# Patient Record
Sex: MALE | Race: WHITE | Employment: UNEMPLOYED | URBAN - METROPOLITAN AREA
[De-identification: names, ages, dates, MRNs, and addresses within clinical notes are randomized per-mention and may not be internally consistent; named-entity substitution may affect disease eponyms.]

---

## 2017-01-16 ENCOUNTER — TELEPHONE (OUTPATIENT)
Dept: CARDIOLOGY CLINIC | Age: 47
End: 2017-01-16

## 2017-01-16 NOTE — TELEPHONE ENCOUNTER
Benton Layton, verified on hippa form, said pt's labs have been completed for [address] [place] [address] program. She said [address] [place] [address] will be faxing something to us to to get the prior auth going. She also gave me the number [phone] to call. I told her we would work on this.

## 2017-01-17 NOTE — TELEPHONE ENCOUNTER
Mateo Cody on hippa form, left voice message on phone saying she is going to fax a Repatha paper to us to fill out for the [employer] Program. She said we need to fill out our portion and fax to [address] [place] [address] Avenue. I printed out pt's labs and also sent in original paper to [address] [place] [address] Avenue.

## 2017-02-03 NOTE — TELEPHONE ENCOUNTER
Called pt and left message stating that all his Repatha papers and assistance paperwork has been faxed off. Advised if he has any questions he could call me back or wait until his upcoming appt next week with  to discuss.

## 2017-02-07 ENCOUNTER — OFFICE VISIT (OUTPATIENT)
Dept: CARDIOLOGY CLINIC | Age: 47
End: 2017-02-07

## 2017-02-07 VITALS
SYSTOLIC BLOOD PRESSURE: 120 MMHG | WEIGHT: 266 LBS | OXYGEN SATURATION: 96 % | HEIGHT: 70 IN | BODY MASS INDEX: 38.08 KG/M2 | RESPIRATION RATE: 18 BRPM | DIASTOLIC BLOOD PRESSURE: 82 MMHG | HEART RATE: 64 BPM

## 2017-02-07 DIAGNOSIS — I10 ESSENTIAL HYPERTENSION, BENIGN: Primary | ICD-10-CM

## 2017-02-07 DIAGNOSIS — Z98.61 S/P PTCA (PERCUTANEOUS TRANSLUMINAL CORONARY ANGIOPLASTY): ICD-10-CM

## 2017-02-07 DIAGNOSIS — E78.2 MIXED HYPERLIPIDEMIA: ICD-10-CM

## 2017-02-07 DIAGNOSIS — I63.341 CEREBRAL INFARCTION DUE TO THROMBOSIS OF RIGHT CEREBELLAR ARTERY (HCC): ICD-10-CM

## 2017-02-07 DIAGNOSIS — I25.10 CORONARY ARTERY DISEASE INVOLVING NATIVE CORONARY ARTERY OF NATIVE HEART WITHOUT ANGINA PECTORIS: ICD-10-CM

## 2017-02-07 NOTE — MR AVS SNAPSHOT
Visit Information Date & Time Provider Department Dept. Phone Encounter #  
 2/7/2017  9:00 AM Rachael Craig, [address] [place] [address] [facility] [phone] [id number] Follow-up Instructions Return in about 6 months (around 8/7/2017). Your Appointments 3/7/2017  3:30 PM  
ROUTINE CARE with Ya Ashford MD  
[facility] ([facility]) Appt Note: f/u appt $0pb $0cp [facility] 12/20/16  
 [address] [place] [address]  
[phone]  
  
   
 [facility] [address] Upcoming Health Maintenance Date Due DTaP/Tdap/Td series (2 - Td) 11/30/2025 Allergies as of 2/7/2017  Review Complete On: 2/7/2017 By: Rachael Craig MD  
  
 Severity Noted Reaction Type Reactions Crestor [Rosuvastatin]  12/11/2014    Rash Iodinated Contrast Media - Oral And Iv Dye  02/26/2013    Hives Statins-hmg-coa Reductase Inhibitors  07/26/2016    Myalgia Current Immunizations  Reviewed on 9/12/2016 Name Date Influenza Vaccine (Quad) PF 9/12/2016, 11/30/2015 Tdap 11/30/2015 Not reviewed this visit You Were Diagnosed With   
  
 Codes Comments Essential hypertension, benign    -  Primary ICD-10-CM: I10 
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  
 120/82 (BP 1 Location: Right arm, BP Patient Position: Sitting) 64 18 5' 10\" (1.778 m) 266 lb (120.7 kg) 96% BMI Smoking Status 38.17 kg/m2 Former Smoker Vitals History BMI and BSA Data Body Mass Index Body Surface Area  
 38.17 kg/m 2 2.44 m 2 Preferred Pharmacy Pharmacy Name Phone [facility] Adriane Bravo [phone] Your Updated Medication List  
  
   
This list is accurate as of: 2/7/17  9:31 AM.  Always use your most recent med list. ALLEGRA 180 mg tablet Generic drug:  fexofenadine Take  by mouth daily. citalopram 20 mg tablet Commonly known as:  CELEXA  
TAKE ONE & ONE-HALF TABLETS BY MOUTH ONCE DAILY  
  
 clopidogrel 75 mg Tab Commonly known as:  PLAVIX Take 1 Tab by mouth daily. evolocumab pen injection Commonly known as:  Reese Diaz  
by SubCUTAneous route. FISH OIL 1,000 mg Cap Generic drug:  omega-3 fatty acids-vitamin e Take 1 Cap by mouth.  
  
 isosorbide mononitrate ER 60 mg CR tablet Commonly known as:  IMDUR  
TAKE ONE TABLET BY MOUTH IN THE MORNING  
  
 metoprolol tartrate 25 mg tablet Commonly known as:  LOPRESSOR Take 0.5 Tabs by mouth nightly. nortriptyline 25 mg capsule Commonly known as:  PAMELOR Take 2 Caps by mouth nightly. pantoprazole 20 mg tablet Commonly known as:  PROTONIX  
TAKE ONE TABLET BY MOUTH TWICE DAILY Ranolazine 1,000 mg Tb12 Commonly known as:  RANEXA Take 1 Tab by mouth two (2) times a day. SUMAtriptan 100 mg tablet Commonly known as:  IMITREX Take 1 Tab by mouth once as needed for Migraine. topiramate 50 mg tablet Commonly known as:  TOPAMAX Take 1 Tab by mouth two (2) times a day. We Performed the Following AMB POC EKG ROUTINE W/ 12 LEADS, INTER & REP [31734 CPT(R)] Follow-up Instructions Return in about 6 months (around 8/7/2017). Introducing [facility]! Dear Srinivas Gonzalez: Thank you for requesting a Fashiontrothart account.   Our records indicate that you already have an active MenuSpring account. You can access your account anytime at [email or web address] Did you know that you can access your hospital and ER discharge instructions at any time in MenuSpring? You can also review all of your test results from your hospital stay or ER visit. Additional Information If you have questions, please visit the Frequently Asked Questions section of the MenuSpring website at [email or web address] Remember, MenuSpring is NOT to be used for urgent needs. For medical emergencies, dial 911. Now available from your iPhone and Android! Please provide this summary of care documentation to your next provider. Your primary care clinician is listed as Jaz Wayne. If you have any questions after today's visit, please call [phone].

## 2017-02-07 NOTE — PROGRESS NOTES
Chief Complaint   Patient presents with    Hypertension     6 month follow up     C/O SOB on exertion

## 2017-02-07 NOTE — PROGRESS NOTES
2/7/2017 9:29 AM      Subjective:     Aubree Ying is here for f/u visit. He denies chest pain, chest pressure/discomfort, palpitations, irregular heart beats, near-syncope, syncope, orthopnea, paroxysmal nocturnal dyspnea, claudication, lower extremity edema. Visit Vitals    /82 (BP 1 Location: Right arm, BP Patient Position: Sitting)    Pulse 64    Resp 18    Ht 5' 10\" (1.778 m)    Wt 266 lb (120.7 kg)    SpO2 96%    BMI 38.17 kg/m2     Current Outpatient Prescriptions   Medication Sig    metoprolol tartrate (LOPRESSOR) 25 mg tablet Take 0.5 Tabs by mouth nightly.  topiramate (TOPAMAX) 50 mg tablet Take 1 Tab by mouth two (2) times a day.  SUMAtriptan (IMITREX) 100 mg tablet Take 1 Tab by mouth once as needed for Migraine.  nortriptyline (PAMELOR) 25 mg capsule Take 2 Caps by mouth nightly.  citalopram (CELEXA) 20 mg tablet TAKE ONE & ONE-HALF TABLETS BY MOUTH ONCE DAILY    clopidogrel (PLAVIX) 75 mg tablet Take 1 Tab by mouth daily.  pantoprazole (PROTONIX) 20 mg tablet TAKE ONE TABLET BY MOUTH TWICE DAILY    isosorbide mononitrate ER (IMDUR) 60 mg CR tablet TAKE ONE TABLET BY MOUTH IN THE MORNING    Ranolazine (RANEXA) 1,000 mg Tb12 Take 1 Tab by mouth two (2) times a day.  omega-3 fatty acids-vitamin e (FISH OIL) 1,000 mg cap Take 1 Cap by mouth.  fexofenadine (ALLEGRA) 180 mg tablet Take  by mouth daily.  evolocumab (REPATHA SURECLICK) pen injection by SubCUTAneous route. No current facility-administered medications for this visit.           Objective:      Visit Vitals    /82 (BP 1 Location: Right arm, BP Patient Position: Sitting)    Pulse 64    Resp 18    Ht 5' 10\" (1.778 m)    Wt 266 lb (120.7 kg)    SpO2 96%    BMI 38.17 kg/m2       Data Review:     EKG: Normal sinus rhythm, no acute st/t changes    Reviewed and/or ordered active problem list, medication list tests    Past Medical History   Diagnosis Date    CAD (coronary artery disease)     GERD (gastroesophageal reflux disease)     Headache(784.0)     Hypertension     Stroke ([facility] [address] 2011      Past Surgical History   Procedure Laterality Date    Hx coronary stent placement      Pr cardiac surg procedure unlist       Allergies   Allergen Reactions    Crestor [Rosuvastatin] Rash    Iodinated Contrast Media - Oral And Iv Dye Hives    Statins-Hmg-Coa Reductase Inhibitors Myalgia      Family History   Problem Relation Age of Onset    Heart Disease Mother     Hypertension Mother     Diabetes Father     Heart Disease Father       Social History     Social History    Marital status: SINGLE     Spouse name: N/A    Number of children: 0    Years of education: N/A     Occupational History    disabled Not Employed     Social History Main Topics    Smoking status: Former Smoker     Packs/day: 0.30     Years: 30.00     Types: Cigarettes     Quit date: 9/19/2011    Smokeless tobacco: Never Used    Alcohol use No    Drug use: No    Sexual activity: Not Currently     Other Topics Concern    Not on file     Social History Narrative    Lives on his own friends and relatives look in on him. Review of Systems     General: Not Present- Anorexia, Chills, Dietary Changes, Fatigue, Fever, Medication Changes, Night Sweats, Weight Gain > 10lbs. and Weight Loss > 10lbs. .  Skin: Not Present- Bruising and Excessive Sweating. HEENT: Not Present- Headache, Visual Loss and Vertigo. Respiratory: Not Present- Cough, Decreased Exercise Tolerance, Difficulty Breathing, Snoring and Wheezing. Cardiovascular: Not Present- Abnormal Blood Pressure, Chest Pain, Claudications, Difficulty Breathing On Exertion, Edema, Fainting / Blacking Out, Irregular Heart Beat, Night Cramps, Orthopnea, Palpitations, Paroxysmal Nocturnal Dyspnea, Rapid Heart Rate, Shortness of Breath and Swelling of Extremities.   Gastrointestinal: Not Present- Black, Tarry Stool, Bloody Stool, Diarrhea, Hematemesis, Rectal Bleeding and Vomiting. Musculoskeletal: Not Present- Muscle Pain and Muscle Weakness. Neurological: Not Present- Dizziness. Psychiatric: Not Present- Depression. Endocrine: Not Present- Cold Intolerance, Heat Intolerance and Thyroid Problems. Hematology: Not Present- Abnormal Bleeding, Anemia, Blood Clots and Easy Bruising.       Physical Exam   The physical exam findings are as follows:       General   Mental Status - Alert. General Appearance - Not in acute distress. Chest and Lung Exam   Inspection: Accessory muscles - No use of accessory muscles in breathing. Auscultation:   Breath sounds: - Normal.      Cardiovascular   Inspection: Jugular vein - Bilateral - Inspection Normal.  Palpation/Percussion:   Apical Impulse: - Normal.  Auscultation: Rhythm - Regular. Heart Sounds - S1 WNL and S2 WNL. No S3 or S4. Murmurs & Other Heart Sounds: Auscultation of the heart reveals - No Murmurs. Carotid arteries - No Carotid bruit. Peripheral Vascular   Upper Extremity: Inspection - Bilateral - No Cyanotic nailbeds or Digital clubbing. Lower Extremity:   Palpation: Edema - Bilateral - No edema. Assessment:       ICD-10-CM ICD-9-CM    1. Essential hypertension, benign I10 401.1 AMB POC EKG ROUTINE W/ 12 LEADS, INTER & REP   2. Coronary artery disease involving native coronary artery of native heart without angina pectoris I25.10 414.01    3. S/P PTCA (percutaneous transluminal coronary angioplasty) Z98.61 V45.82    4. 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 Mixed hyperlipidemia E78.2 272.2        Plan:     1 . CAD: stable. Medical treatment. Normal stress test last Jan. On last cath 3/2013 :LM: mild disease.  LAD: proximal moderate calcified disease. Previously placed proximal and mid LAD stent patent. Distal LAD has mild disease with 30% focal stenosis. Large size D1 with mild disease, ostial 20-30% stenosis.  LCx: large and dominant.  Mild disease. OM1 mild disease. OM2 large size, mild disease. Distal LCx distal to OM2 extending into LPDA is 100% occluded at the ostium, distal vessel is visualized via collaterals.  RCA: small, non dominant, mild disease. 2. Hyperlipidemia: on repatha. Working on getting reauth for next year. Diet d/w pt. Does not drink. 3. HTN: stable.

## 2017-02-21 ENCOUNTER — TELEPHONE (OUTPATIENT)
Dept: CARDIOLOGY CLINIC | Age: 47
End: 2017-02-21

## 2017-02-21 NOTE — TELEPHONE ENCOUNTER
Called Fatimah on hippa form and left message to call me back regarding pt's [place]. Edson Lazar on hippa form, returned my call, I relayed message that Sofía Lindsay is not going to cover his [address] since he has coverage thru his insurance. She said she will try to call his insurance and get back to me next week and I told her I will reach out to [place] Koehler once I hear something from her. She verbalized that she understood everything. Called Fatimah on hippa form and left message to call me back.

## 2017-03-07 ENCOUNTER — OFFICE VISIT (OUTPATIENT)
Dept: INTERNAL MEDICINE CLINIC | Age: 47
End: 2017-03-07

## 2017-03-07 VITALS
HEIGHT: 70 IN | RESPIRATION RATE: 16 BRPM | SYSTOLIC BLOOD PRESSURE: 108 MMHG | TEMPERATURE: 97.7 F | BODY MASS INDEX: 37.94 KG/M2 | HEART RATE: 60 BPM | OXYGEN SATURATION: 97 % | WEIGHT: 265 LBS | DIASTOLIC BLOOD PRESSURE: 77 MMHG

## 2017-03-07 DIAGNOSIS — G44.019 EPISODIC CLUSTER HEADACHE, NOT INTRACTABLE: ICD-10-CM

## 2017-03-07 DIAGNOSIS — I95.9 HYPOTENSION, UNSPECIFIED HYPOTENSION TYPE: Primary | ICD-10-CM

## 2017-03-07 DIAGNOSIS — E78.2 MIXED HYPERLIPIDEMIA: ICD-10-CM

## 2017-03-07 DIAGNOSIS — I69.354 HEMIPARESIS AFFECTING LEFT SIDE AS LATE EFFECT OF CEREBROVASCULAR ACCIDENT (CVA) (HCC): ICD-10-CM

## 2017-03-07 DIAGNOSIS — I25.10 CORONARY ARTERY DISEASE INVOLVING NATIVE CORONARY ARTERY OF NATIVE HEART WITHOUT ANGINA PECTORIS: ICD-10-CM

## 2017-03-07 NOTE — MR AVS SNAPSHOT
Visit Information Date & Time Provider Department Dept. Phone Encounter #  
 3/7/2017  3:30 PM MD Francisco Garcia Dr [id number] Follow-up Instructions Return in about 4 months (around 7/7/2017) for routine follow up. Your Appointments 8/1/2017  9:00 AM  
6 MONTH with Regla Estrada MD  
[facility] Domenica Lee) Appt Note: Per  C $0CP REM  
 [address]  
[phone] [address] Upcoming Health Maintenance Date Due DTaP/Tdap/Td series (2 - Td) 11/30/2025 Allergies as of 3/7/2017  Review Complete On: 3/7/2017 By: Phillip Bettencourt LPN Severity Noted Reaction Type Reactions Crestor [Rosuvastatin]  12/11/2014    Rash Iodinated Contrast Media - Oral And Iv Dye  02/26/2013    Hives Statins-hmg-coa Reductase Inhibitors  07/26/2016    Myalgia Current Immunizations  Reviewed on 9/12/2016 Name Date Influenza Vaccine (Quad) PF 9/12/2016, 11/30/2015 Tdap 11/30/2015 Not reviewed this visit You Were Diagnosed With   
  
 Codes Comments Hypotension, unspecified hypotension type    -  Primary ICD-10-CM: I95.9 ICD-9-CM: 458.9 Mixed hyperlipidemia     ICD-10-CM: E78.2 ICD-9-CM: 272.2 Episodic cluster headache, not intractable     ICD-10-CM: Q21.820 
ICD-9-CM: 339.01 Coronary artery disease involving native coronary artery of native heart without angina pectoris     ICD-10-CM: I25.10 ICD-9-CM: 414.01 Vitals BP Pulse Temp Resp Height(growth percentile) Weight(growth percentile) 108/77 (BP 1 Location: Right arm, BP Patient Position: Sitting) 60 97.7 °F (36.5 °C) (Oral) 16 5' 10\" (1.778 m) 265 lb (120.2 kg) SpO2 BMI Smoking Status 97% 38.02 kg/m2 Former Smoker BMI and BSA Data Body Mass Index Body Surface Area  38.02 kg/m 2 2.44 m 2  
  
 Preferred Pharmacy Pharmacy Name Phone [facility] Adriane Bravo [phone] Your Updated Medication List  
  
   
This list is accurate as of: 3/7/17  4:46 PM.  Always use your most recent med list. ALLEGRA 180 mg tablet Generic drug:  fexofenadine Take  by mouth daily. citalopram 20 mg tablet Commonly known as:  CELEXA  
TAKE ONE & ONE-HALF TABLETS BY MOUTH ONCE DAILY  
  
 clopidogrel 75 mg Tab Commonly known as:  PLAVIX Take 1 Tab by mouth daily. evolocumab pen injection Commonly known as:  Mary Bodo  
by SubCUTAneous route. FISH OIL 1,000 mg Cap Generic drug:  omega-3 fatty acids-vitamin e Take 1 Cap by mouth.  
  
 isosorbide mononitrate ER 60 mg CR tablet Commonly known as:  IMDUR  
TAKE ONE TABLET BY MOUTH IN THE MORNING  
  
 metoprolol tartrate 25 mg tablet Commonly known as:  LOPRESSOR Take 0.5 Tabs by mouth nightly. nortriptyline 25 mg capsule Commonly known as:  PAMELOR Take 2 Caps by mouth nightly. pantoprazole 20 mg tablet Commonly known as:  PROTONIX  
TAKE ONE TABLET BY MOUTH TWICE DAILY Ranolazine 1,000 mg Tb12 Commonly known as:  RANEXA Take 1 Tab by mouth two (2) times a day. SUMAtriptan 100 mg tablet Commonly known as:  IMITREX Take 1 Tab by mouth once as needed for Migraine. topiramate 50 mg tablet Commonly known as:  TOPAMAX Take 1 Tab by mouth two (2) times a day. Follow-up Instructions Return in about 4 months (around 7/7/2017) for routine follow up. Introducing [facility]! Dear Francisac Sanon: Thank you for requesting a Cornice account. Our records indicate that you already have an active Cornice account. You can access your account anytime at [email or web address] Did you know that you can access your hospital and ER discharge instructions at any time in 1World Online? You can also review all of your test results from your hospital stay or ER visit. Additional Information If you have questions, please visit the Frequently Asked Questions section of the 1World Online website at [email or web address] Remember, 1World Online is NOT to be used for urgent needs. For medical emergencies, dial 911. Now available from your iPhone and Android! Please provide this summary of care documentation to your next provider. Your primary care clinician is listed as Noemi Parker. If you have any questions after today's visit, please call [phone].

## 2017-03-08 PROBLEM — I69.354 HEMIPARESIS AFFECTING LEFT SIDE AS LATE EFFECT OF CEREBROVASCULAR ACCIDENT (CVA) (HCC): Status: ACTIVE | Noted: 2017-03-08

## 2017-03-08 PROBLEM — I25.10 CORONARY ARTERY DISEASE INVOLVING NATIVE CORONARY ARTERY OF NATIVE HEART WITHOUT ANGINA PECTORIS: Status: ACTIVE | Noted: 2017-03-08

## 2017-03-08 NOTE — PROGRESS NOTES
HISTORY OF PRESENT ILLNESS  Rashmi Jones is a 48 y.o. male. Hypotension   The history is provided by the patient and caregiver. This is a recurrent problem. The problem has been gradually improving. Pertinent negatives include no chest pain, no headaches and no shortness of breath. Treatments tried: Reduced his blood pressure medicine. The treatment provided mild relief. previously was on beta-blocker, which I reduced the dosage. Not really symptomatic with his hypotension and had not noticed much changes with the change in medicines. Cannot reduce nitroglycerin otherwise he gets chest pain. Headaches are stable still gets some, seem to be not too severe and have some relief with the Imitrex. Cardiology is trying to get him Repatha. Has to be cleared through insurance, very expensive. Coronary artery disease is stable. Current Outpatient Prescriptions   Medication Sig Dispense Refill    metoprolol tartrate (LOPRESSOR) 25 mg tablet Take 0.5 Tabs by mouth nightly. 45 Tab 3    topiramate (TOPAMAX) 50 mg tablet Take 1 Tab by mouth two (2) times a day. 60 Tab 5    SUMAtriptan (IMITREX) 100 mg tablet Take 1 Tab by mouth once as needed for Migraine. 10 Tab 5    nortriptyline (PAMELOR) 25 mg capsule Take 2 Caps by mouth nightly. 60 Cap 11    citalopram (CELEXA) 20 mg tablet TAKE ONE & ONE-HALF TABLETS BY MOUTH ONCE DAILY 50 Tab 11    clopidogrel (PLAVIX) 75 mg tablet Take 1 Tab by mouth daily. 30 Tab 11    pantoprazole (PROTONIX) 20 mg tablet TAKE ONE TABLET BY MOUTH TWICE DAILY 60 Tab 11    evolocumab (REPATHA SURECLICK) pen injection by SubCUTAneous route.  isosorbide mononitrate ER (IMDUR) 60 mg CR tablet TAKE ONE TABLET BY MOUTH IN THE MORNING 30 Tab 11    Ranolazine (RANEXA) 1,000 mg Tb12 Take 1 Tab by mouth two (2) times a day. 60 Tab 11    omega-3 fatty acids-vitamin e (FISH OIL) 1,000 mg cap Take 1 Cap by mouth.  fexofenadine (ALLEGRA) 180 mg tablet Take  by mouth daily. Allergies   Allergen Reactions    Crestor [Rosuvastatin] Rash    Iodinated Contrast Media - Oral And Iv Dye Hives    Statins-Hmg-Coa Reductase Inhibitors Myalgia       Past Medical History:   Diagnosis Date    CAD (coronary artery disease)     GERD (gastroesophageal reflux disease)     Headache(784.0)     Hypertension     Stroke ([facility] [address] 2011     Social History     Social History    Marital status: SINGLE     Spouse name: N/A    Number of children: 0    Years of education: N/A     Occupational History    disabled Not Employed     Social History Main Topics    Smoking status: Former Smoker     Packs/day: 0.30     Years: 30.00     Types: Cigarettes     Quit date: 9/19/2011    Smokeless tobacco: Never Used    Alcohol use No    Drug use: No    Sexual activity: Not Currently     Other Topics Concern    Not on file     Social History Narrative    Lives on his own friends and relatives look in on him. Review of Systems   Respiratory: Negative for shortness of breath. Cardiovascular: Negative for chest pain. Neurological: Negative for headaches. Physical Exam  Visit Vitals    /77 (BP 1 Location: Right arm, BP Patient Position: Sitting)    Pulse 60    Temp 97.7 °F (36.5 °C) (Oral)    Resp 16    Ht 5' 10\" (1.778 m)    Wt 265 lb (120.2 kg)    SpO2 97%    BMI 38.02 kg/m2     WD WN male NAD  Heart RRR without murmers clicks or rubs  Lungs CTA  Abdo soft nontender  Ext partial left-sided arm and leg hemiparesis    ASSESSMENT and PLAN  Encounter Diagnoses   Name Primary?  Hypotension, unspecified hypotension type Yes    Mixed hyperlipidemia     Episodic cluster headache, not intractable     Coronary artery disease involving native coronary artery of native heart without angina pectoris      Blood pressure better. Headaches stable, continue current medications. Coronary disease stable.   Status post CVA stable

## 2017-03-10 ENCOUNTER — TELEPHONE (OUTPATIENT)
Dept: CARDIOLOGY CLINIC | Age: 47
End: 2017-03-10

## 2017-03-13 NOTE — TELEPHONE ENCOUNTER
Zoë Bello on hippa form returned my call and stated that she has spoken to [address] [place] [address] and they told her it has been approved and she is just waiting on the pharmacy to send. I verified that I had received a letter from [address] [place] [address] stating that they had approved [address] [place] [address] thru his insurance. She said she would call Repatha and see if the medication has been shipped yet and would let me know. She verbalized she understood everything. Called Repatha Ready to see where the Rx for Repatha needs to go. She verified that the Rx would go to any local pharmacy with refrigeration or thru [facility] mail order pharmacy that could mail out order to pt. [facility] # is [phone] and the fax # is [phone]. [facility] and called in a verbal for pt's Kaushal Chau pen. She verbalized everything back to me and I verified it was correct. She said they will contact the pt in 24-48 hrs to go over address and copay. Called [facility] on hippa form, told her that [email or web address] is filling the Repatha for him and will be contacting her soon to let her know when the [address] [place] [address] Avenue will be shipped and how much it will be. She verbalized that she understood everything.

## 2017-03-30 ENCOUNTER — TELEPHONE (OUTPATIENT)
Dept: CARDIOLOGY CLINIC | Age: 47
End: 2017-03-30

## 2017-03-30 NOTE — TELEPHONE ENCOUNTER
Fatimah hodgson called and left message on my phone stating that pt's Lissa Hearing has been approved with 0 copay. She just wanted to call and thank me and  for everything. Returned [facility][1] Link Trigger call and left message letting her know that I had received her message yesterday about the repatha. Advised if she needs anything else to let me know, other wise we will see them in a few months and start the process all over.

## 2017-04-24 ENCOUNTER — HOSPITAL ENCOUNTER (INPATIENT)
Age: 47
LOS: 2 days | Discharge: HOME | DRG: 045 | End: 2017-04-26
Attending: INTERNAL MEDICINE | Admitting: INTERNAL MEDICINE
Payer: MEDICAID

## 2017-04-24 ENCOUNTER — APPOINTMENT (OUTPATIENT)
Dept: CT IMAGING | Age: 47
DRG: 045 | End: 2017-04-24
Attending: PHYSICIAN ASSISTANT
Payer: MEDICAID

## 2017-04-24 ENCOUNTER — APPOINTMENT (OUTPATIENT)
Dept: GENERAL RADIOLOGY | Age: 47
DRG: 045 | End: 2017-04-24
Attending: INTERNAL MEDICINE
Payer: MEDICAID

## 2017-04-24 DIAGNOSIS — I63.319 CEREBROVASCULAR ACCIDENT (CVA) DUE TO THROMBOSIS OF MIDDLE CEREBRAL ARTERY, UNSPECIFIED BLOOD VESSEL LATERALITY (HCC): Primary | ICD-10-CM

## 2017-04-24 DIAGNOSIS — R29.898 LEFT ARM WEAKNESS: ICD-10-CM

## 2017-04-24 DIAGNOSIS — R29.810 FACIAL WEAKNESS: ICD-10-CM

## 2017-04-24 PROBLEM — I63.9 ACUTE CVA (CEREBROVASCULAR ACCIDENT) (HCC): Status: ACTIVE | Noted: 2017-04-24

## 2017-04-24 LAB
ALBUMIN SERPL BCP-MCNC: 4 G/DL (ref 3.4–5)
ALBUMIN/GLOB SERPL: 1 {RATIO} (ref 0.8–1.7)
ALP SERPL-CCNC: 65 U/L (ref 45–117)
ALT SERPL-CCNC: 36 U/L (ref 16–61)
ANION GAP BLD CALC-SCNC: 10 MMOL/L (ref 3–18)
APTT PPP: 46 SEC (ref 23–36.4)
AST SERPL W P-5'-P-CCNC: 24 U/L (ref 15–37)
BASOPHILS # BLD AUTO: 0 K/UL (ref 0–0.06)
BASOPHILS # BLD: 0 % (ref 0–2)
BILIRUB SERPL-MCNC: 0.5 MG/DL (ref 0.2–1)
BUN SERPL-MCNC: 10 MG/DL (ref 7–18)
BUN/CREAT SERPL: 8 (ref 12–20)
CALCIUM SERPL-MCNC: 8.7 MG/DL (ref 8.5–10.1)
CHLORIDE SERPL-SCNC: 104 MMOL/L (ref 100–108)
CK MB CFR SERPL CALC: 1.5 % (ref 0–4)
CK MB SERPL-MCNC: 2.9 NG/ML (ref 5–25)
CK SERPL-CCNC: 196 U/L (ref 39–308)
CO2 SERPL-SCNC: 26 MMOL/L (ref 21–32)
CREAT SERPL-MCNC: 1.25 MG/DL (ref 0.6–1.3)
DIFFERENTIAL METHOD BLD: ABNORMAL
EOSINOPHIL # BLD: 0.1 K/UL (ref 0–0.4)
EOSINOPHIL NFR BLD: 1 % (ref 0–5)
ERYTHROCYTE [DISTWIDTH] IN BLOOD BY AUTOMATED COUNT: 13 % (ref 11.6–14.5)
GLOBULIN SER CALC-MCNC: 4 G/DL (ref 2–4)
GLUCOSE BLD STRIP.AUTO-MCNC: 158 MG/DL (ref 70–110)
GLUCOSE SERPL-MCNC: 119 MG/DL (ref 74–99)
HCT VFR BLD AUTO: 40.9 % (ref 36–48)
HGB BLD-MCNC: 15.5 G/DL (ref 13–16)
INR PPP: 1 (ref 0.8–1.2)
LYMPHOCYTES # BLD AUTO: 39 % (ref 21–52)
LYMPHOCYTES # BLD: 2.7 K/UL (ref 0.9–3.6)
MAGNESIUM SERPL-MCNC: 1.9 MG/DL (ref 1.6–2.6)
MCH RBC QN AUTO: 36.1 PG (ref 24–34)
MCHC RBC AUTO-ENTMCNC: 37.9 G/DL (ref 31–37)
MCV RBC AUTO: 95.3 FL (ref 74–97)
MONOCYTES # BLD: 0.5 K/UL (ref 0.05–1.2)
MONOCYTES NFR BLD AUTO: 8 % (ref 3–10)
NEUTS SEG # BLD: 3.6 K/UL (ref 1.8–8)
NEUTS SEG NFR BLD AUTO: 52 % (ref 40–73)
PLATELET # BLD AUTO: 222 K/UL (ref 135–420)
PMV BLD AUTO: 10 FL (ref 9.2–11.8)
POTASSIUM SERPL-SCNC: 3.6 MMOL/L (ref 3.5–5.5)
PROT SERPL-MCNC: 8 G/DL (ref 6.4–8.2)
PROTHROMBIN TIME: 12.9 SEC (ref 11.5–15.2)
RBC # BLD AUTO: 4.29 M/UL (ref 4.7–5.5)
SODIUM SERPL-SCNC: 140 MMOL/L (ref 136–145)
TROPONIN I SERPL-MCNC: <0.02 NG/ML (ref 0–0.06)
WBC # BLD AUTO: 6.8 K/UL (ref 4.6–13.2)

## 2017-04-24 PROCEDURE — 82550 ASSAY OF CK (CPK): CPT | Performed by: INTERNAL MEDICINE

## 2017-04-24 PROCEDURE — 82962 GLUCOSE BLOOD TEST: CPT

## 2017-04-24 PROCEDURE — 99285 EMERGENCY DEPT VISIT HI MDM: CPT

## 2017-04-24 PROCEDURE — 3E03317 INTRODUCTION OF OTHER THROMBOLYTIC INTO PERIPHERAL VEIN, PERCUTANEOUS APPROACH: ICD-10-PCS | Performed by: INTERNAL MEDICINE

## 2017-04-24 PROCEDURE — 85025 COMPLETE CBC W/AUTO DIFF WBC: CPT | Performed by: INTERNAL MEDICINE

## 2017-04-24 PROCEDURE — 65610000006 HC RM INTENSIVE CARE

## 2017-04-24 PROCEDURE — 93005 ELECTROCARDIOGRAM TRACING: CPT

## 2017-04-24 PROCEDURE — 80053 COMPREHEN METABOLIC PANEL: CPT | Performed by: INTERNAL MEDICINE

## 2017-04-24 PROCEDURE — 85730 THROMBOPLASTIN TIME PARTIAL: CPT | Performed by: INTERNAL MEDICINE

## 2017-04-24 PROCEDURE — 96374 THER/PROPH/DIAG INJ IV PUSH: CPT

## 2017-04-24 PROCEDURE — 85610 PROTHROMBIN TIME: CPT | Performed by: INTERNAL MEDICINE

## 2017-04-24 PROCEDURE — 36415 COLL VENOUS BLD VENIPUNCTURE: CPT | Performed by: INTERNAL MEDICINE

## 2017-04-24 PROCEDURE — 70450 CT HEAD/BRAIN W/O DYE: CPT

## 2017-04-24 PROCEDURE — 74011000258 HC RX REV CODE- 258: Performed by: INTERNAL MEDICINE

## 2017-04-24 PROCEDURE — 71010 XR CHEST PORT: CPT

## 2017-04-24 PROCEDURE — 74011250636 HC RX REV CODE- 250/636: Performed by: INTERNAL MEDICINE

## 2017-04-24 PROCEDURE — 83735 ASSAY OF MAGNESIUM: CPT | Performed by: INTERNAL MEDICINE

## 2017-04-24 RX ORDER — ACETAMINOPHEN 650 MG/1
650 SUPPOSITORY RECTAL
Status: DISCONTINUED | OUTPATIENT
Start: 2017-04-24 | End: 2017-04-26 | Stop reason: HOSPADM

## 2017-04-24 RX ORDER — SODIUM CHLORIDE 0.9 % (FLUSH) 0.9 %
5 SYRINGE (ML) INJECTION AS NEEDED
Status: DISCONTINUED | OUTPATIENT
Start: 2017-04-24 | End: 2017-04-26 | Stop reason: HOSPADM

## 2017-04-24 RX ORDER — LORATADINE 10 MG/1
10 TABLET ORAL DAILY
Status: DISCONTINUED | OUTPATIENT
Start: 2017-04-25 | End: 2017-04-26 | Stop reason: HOSPADM

## 2017-04-24 RX ORDER — SODIUM CHLORIDE 0.9 % (FLUSH) 0.9 %
5 SYRINGE (ML) INJECTION EVERY 8 HOURS
Status: DISCONTINUED | OUTPATIENT
Start: 2017-04-24 | End: 2017-04-26 | Stop reason: HOSPADM

## 2017-04-24 RX ORDER — LABETALOL HYDROCHLORIDE 5 MG/ML
5 INJECTION, SOLUTION INTRAVENOUS
Status: DISCONTINUED | OUTPATIENT
Start: 2017-04-24 | End: 2017-04-26

## 2017-04-24 RX ORDER — ONDANSETRON 2 MG/ML
1 INJECTION INTRAMUSCULAR; INTRAVENOUS
Status: DISCONTINUED | OUTPATIENT
Start: 2017-04-24 | End: 2017-04-26 | Stop reason: HOSPADM

## 2017-04-24 RX ORDER — DOCUSATE SODIUM 100 MG/1
100 CAPSULE, LIQUID FILLED ORAL 2 TIMES DAILY
Status: DISCONTINUED | OUTPATIENT
Start: 2017-04-25 | End: 2017-04-25

## 2017-04-24 RX ORDER — RANOLAZINE 500 MG/1
1000 TABLET, EXTENDED RELEASE ORAL 2 TIMES DAILY
Status: DISCONTINUED | OUTPATIENT
Start: 2017-04-25 | End: 2017-04-26 | Stop reason: HOSPADM

## 2017-04-24 RX ORDER — BISACODYL 5 MG
5 TABLET, DELAYED RELEASE (ENTERIC COATED) ORAL DAILY PRN
Status: DISCONTINUED | OUTPATIENT
Start: 2017-04-24 | End: 2017-04-26 | Stop reason: HOSPADM

## 2017-04-24 RX ORDER — CHLORHEXIDINE GLUCONATE 1.2 MG/ML
15 RINSE ORAL EVERY 12 HOURS
Status: DISCONTINUED | OUTPATIENT
Start: 2017-04-24 | End: 2017-04-24

## 2017-04-24 RX ORDER — SODIUM CHLORIDE 9 MG/ML
50 INJECTION, SOLUTION INTRAVENOUS ONCE
Status: COMPLETED | OUTPATIENT
Start: 2017-04-24 | End: 2017-04-24

## 2017-04-24 RX ORDER — LABETALOL HYDROCHLORIDE 5 MG/ML
10 INJECTION, SOLUTION INTRAVENOUS
Status: DISCONTINUED | OUTPATIENT
Start: 2017-04-24 | End: 2017-04-26

## 2017-04-24 RX ORDER — CITALOPRAM 20 MG/1
20 TABLET, FILM COATED ORAL DAILY
Status: DISCONTINUED | OUTPATIENT
Start: 2017-04-25 | End: 2017-04-26 | Stop reason: HOSPADM

## 2017-04-24 RX ORDER — ACETAMINOPHEN 325 MG/1
650 TABLET ORAL
Status: DISCONTINUED | OUTPATIENT
Start: 2017-04-24 | End: 2017-04-26 | Stop reason: HOSPADM

## 2017-04-24 RX ORDER — ALBUTEROL SULFATE 0.83 MG/ML
2.5 SOLUTION RESPIRATORY (INHALATION)
Status: DISCONTINUED | OUTPATIENT
Start: 2017-04-24 | End: 2017-04-26 | Stop reason: HOSPADM

## 2017-04-24 RX ORDER — FACIAL-BODY WIPES
10 EACH TOPICAL DAILY PRN
Status: DISCONTINUED | OUTPATIENT
Start: 2017-04-24 | End: 2017-04-26 | Stop reason: HOSPADM

## 2017-04-24 RX ORDER — ISOSORBIDE MONONITRATE 30 MG/1
60 TABLET, EXTENDED RELEASE ORAL DAILY
Status: DISCONTINUED | OUTPATIENT
Start: 2017-04-25 | End: 2017-04-26 | Stop reason: HOSPADM

## 2017-04-24 RX ORDER — SUMATRIPTAN 25 MG/1
100 TABLET, FILM COATED ORAL
Status: DISCONTINUED | OUTPATIENT
Start: 2017-04-24 | End: 2017-04-26 | Stop reason: HOSPADM

## 2017-04-24 RX ORDER — METOPROLOL TARTRATE 25 MG/1
12.5 TABLET, FILM COATED ORAL
Status: DISCONTINUED | OUTPATIENT
Start: 2017-04-24 | End: 2017-04-26 | Stop reason: HOSPADM

## 2017-04-24 RX ORDER — PANTOPRAZOLE SODIUM 40 MG/1
40 TABLET, DELAYED RELEASE ORAL
Status: DISCONTINUED | OUTPATIENT
Start: 2017-04-25 | End: 2017-04-25

## 2017-04-24 RX ORDER — TOPIRAMATE 25 MG/1
50 TABLET ORAL 2 TIMES DAILY
Status: DISCONTINUED | OUTPATIENT
Start: 2017-04-24 | End: 2017-04-26 | Stop reason: HOSPADM

## 2017-04-24 RX ADMIN — ALTEPLASE 81 MG: KIT at 21:28

## 2017-04-24 RX ADMIN — SODIUM CHLORIDE 50 ML: 900 INJECTION, SOLUTION INTRAVENOUS at 22:35

## 2017-04-24 NOTE — IP AVS SNAPSHOT
Current Discharge Medication List  
  
START taking these medications Dose & Instructions Dispensing Information Comments Morning Noon Evening Bedtime  
 aspirin delayed-release 81 mg tablet Start taking on:  4/27/2017 Your last dose was: Your next dose is:    
   
   
 Dose:  81 mg Take 1 Tab by mouth daily. Quantity:  1 Tab Refills:  0 CONTINUE these medications which have NOT CHANGED Dose & Instructions Dispensing Information Comments Morning Noon Evening Bedtime ALLEGRA 180 mg tablet Generic drug:  fexofenadine Your last dose was: Your next dose is: Take  by mouth daily. Refills:  0  
     
   
   
   
  
 citalopram 20 mg tablet Commonly known as:  Cecy Walters Your last dose was: Your next dose is: TAKE ONE & ONE-HALF TABLETS BY MOUTH ONCE DAILY Quantity:  50 Tab Refills:  11  
     
   
   
   
  
 clopidogrel 75 mg Tab Commonly known as:  PLAVIX Your last dose was: Your next dose is:    
   
   
 Dose:  75 mg Take 1 Tab by mouth daily. Quantity:  30 Tab Refills:  11 evolocumab pen injection Commonly known as:  Miriam Potter Your last dose was: Your next dose is:    
   
   
 by SubCUTAneous route. Refills:  0  
     
   
   
   
  
 FISH OIL 1,000 mg Cap Generic drug:  omega-3 fatty acids-vitamin e Your last dose was: Your next dose is:    
   
   
 Dose:  1 Cap Take 1 Cap by mouth. Refills:  0  
     
   
   
   
  
 isosorbide mononitrate ER 60 mg CR tablet Commonly known as:  IMDUR Your last dose was: Your next dose is: TAKE ONE TABLET BY MOUTH IN THE MORNING Quantity:  30 Tab Refills:  11  
     
   
   
   
  
 metoprolol tartrate 25 mg tablet Commonly known as:  LOPRESSOR Your last dose was: Your next dose is: Dose:  12.5 mg Take 0.5 Tabs by mouth nightly. Quantity:  45 Tab Refills:  3  
     
   
   
   
  
 pantoprazole 20 mg tablet Commonly known as:  PROTONIX Your last dose was: Your next dose is: TAKE ONE TABLET BY MOUTH TWICE DAILY Quantity:  60 Tab Refills:  11  
     
   
   
   
  
 Ranolazine 1,000 mg Tb12 Commonly known as:  RANEXA Your last dose was: Your next dose is:    
   
   
 Dose:  1000 mg Take 1 Tab by mouth two (2) times a day. Quantity:  60 Tab Refills:  11  
     
   
   
   
  
 SUMAtriptan 100 mg tablet Commonly known as:  IMITREX Your last dose was: Your next dose is:    
   
   
 Dose:  100 mg Take 1 Tab by mouth once as needed for Migraine. Quantity:  10 Tab Refills:  5  
     
   
   
   
  
 topiramate 50 mg tablet Commonly known as:  TOPAMAX Your last dose was: Your next dose is:    
   
   
 Dose:  50 mg Take 1 Tab by mouth two (2) times a day. Quantity:  60 Tab Refills:  5 Where to Get Your Medications Information on where to get these meds will be given to you by the nurse or doctor. ! Ask your nurse or doctor about these medications  
  aspirin delayed-release 81 mg tablet

## 2017-04-24 NOTE — IP AVS SNAPSHOT
Hanna Esteves 
 
 
 [address] [address] 
[phone] Patient: Samantha Berger MRN: [id number] :1970 You are allergic to the following Allergen Reactions Crestor (Rosuvastatin) Rash Iodinated Contrast Media - Oral And Iv Dye Hives Statins-Hmg-Coa Reductase Inhibitors Myalgia Recent Documentation Height Weight BMI Smoking Status 1.778 m 119.7 kg 37.86 kg/m2 Former Smoker Emergency Contacts Name Discharge Info Relation Home Work Mobile [address] CAREGIVER [3] Parent [1] [phone] [facility] DISCHARGE CAREGIVER [3] Spouse [3] [phone] About your hospitalization You were admitted on:  2017 You last received care in the:  [facility] [address] You were discharged on:  2017 Unit phone number:  [phone] Why you were hospitalized Your primary diagnosis was:  Acute Cva (Cerebrovascular Accident) (Hcc) Your diagnoses also included:  Facial Droop, Left Arm Weakness, Family History Of Early Cad, Dizziness, Cad (Coronary Artery Disease), S/P Ptca (Percutaneous Transluminal Coronary Angioplasty), Headache, Mixed Hyperlipidemia, Hemiparesis Affecting Left Side As Late Effect Of Cerebrovascular Accident (Cva) (Hcc) Providers Seen During Your Hospitalizations Provider Role Specialty Primary office phone Belgica Shabazz MD Attending Provider Emergency Medicine [phone] Tonny Anderson MD Attending Provider Internal Medicine [phone] Your Primary Care Physician (PCP) Primary Care Physician Office Phone Office Fax Martha Cabrera [phone] Follow-up Information Follow up With Details Comments Contact Info Dr Dayami Munoz  [phone], [address] [facility], [address] [place] [address], [facility] [address] [place] [address], [place] [address] Schedule an appointment as soon as possible for a visit in 2 weeks Patrick Mcgee MD Schedule an appointment as soon as possible for a visit in 1 week  [address] [facility] [address] [place] [address] 
[phone] Current Discharge Medication List  
  
START taking these medications Dose & Instructions Dispensing Information Comments Morning Noon Evening Bedtime  
 aspirin delayed-release 81 mg tablet Start taking on:  4/27/2017 Your last dose was: Your next dose is:    
   
   
 Dose:  81 mg Take 1 Tab by mouth daily. Quantity:  1 Tab Refills:  0 CONTINUE these medications which have NOT CHANGED Dose & Instructions Dispensing Information Comments Morning Noon Evening Bedtime ALLEGRA 180 mg tablet Generic drug:  fexofenadine Your last dose was: Your next dose is: Take  by mouth daily. Refills:  0  
     
   
   
   
  
 citalopram 20 mg tablet Commonly known as:  Sherren Columbia Your last dose was: Your next dose is: TAKE ONE & ONE-HALF TABLETS BY MOUTH ONCE DAILY Quantity:  50 Tab Refills:  11  
     
   
   
   
  
 clopidogrel 75 mg Tab Commonly known as:  PLAVIX Your last dose was: Your next dose is:    
   
   
 Dose:  75 mg Take 1 Tab by mouth daily. Quantity:  30 Tab Refills:  11 evolocumab pen injection Commonly known as:  Nella Stroud Your last dose was: Your next dose is:    
   
   
 by SubCUTAneous route. Refills:  0  
     
   
   
   
  
 FISH OIL 1,000 mg Cap Generic drug:  omega-3 fatty acids-vitamin e Your last dose was: Your next dose is:    
   
   
 Dose:  1 Cap Take 1 Cap by mouth. Refills:  0  
     
   
   
   
  
 isosorbide mononitrate ER 60 mg CR tablet Commonly known as:  IMDUR Your last dose was: Your next dose is:  TAKE ONE TABLET BY MOUTH IN THE MORNING  
 Quantity:  30 Tab Refills:  11  
     
   
   
   
  
 metoprolol tartrate 25 mg tablet Commonly known as:  LOPRESSOR Your last dose was: Your next dose is:    
   
   
 Dose:  12.5 mg Take 0.5 Tabs by mouth nightly. Quantity:  45 Tab Refills:  3  
     
   
   
   
  
 pantoprazole 20 mg tablet Commonly known as:  PROTONIX Your last dose was: Your next dose is: TAKE ONE TABLET BY MOUTH TWICE DAILY Quantity:  60 Tab Refills:  11  
     
   
   
   
  
 Ranolazine 1,000 mg Tb12 Commonly known as:  RANEXA Your last dose was: Your next dose is:    
   
   
 Dose:  1000 mg Take 1 Tab by mouth two (2) times a day. Quantity:  60 Tab Refills:  11  
     
   
   
   
  
 SUMAtriptan 100 mg tablet Commonly known as:  IMITREX Your last dose was: Your next dose is:    
   
   
 Dose:  100 mg Take 1 Tab by mouth once as needed for Migraine. Quantity:  10 Tab Refills:  5  
     
   
   
   
  
 topiramate 50 mg tablet Commonly known as:  TOPAMAX Your last dose was: Your next dose is:    
   
   
 Dose:  50 mg Take 1 Tab by mouth two (2) times a day. Quantity:  60 Tab Refills:  5 Where to Get Your Medications Information on where to get these meds will be given to you by the nurse or doctor. ! Ask your nurse or doctor about these medications  
  aspirin delayed-release 81 mg tablet Discharge Instructions Taking Aspirin to Prevent Heart Attack and Stroke: Care Instructions Your Care Instructions Aspirin acts as a \"blood thinner. \" It prevents blood clots from forming. When taken during and after a heart attack, it can reduce your chance of dying. And it's used if you have a stent in your coronary artery. Also, aspirin helps certain people lower their risk of a heart attack or stroke. Be sure you know what dose of aspirin to take and how often to take it. Low-dose aspirin is typically 81 mg. But the dose for daily aspirin can range from 81 mg to 325 mg. Taking aspirin every day can cause bleeding. It may not be safe if you have stomach ulcers. And it may not be safe if you have high blood pressure that is not controlled. If you take aspirin pills every day, do not take ones that have other ingredients such as caffeine or sodium. Before you start to take aspirin, tell your doctor all the medicines, vitamins, herbal products, and supplements you take. Follow-up care is a key part of your treatment and safety. Be sure to make and go to all appointments, and call your doctor if you are having problems. It's also a good idea to know your test results and keep a list of the medicines you take. How can you care for yourself at home? · Take aspirin with a full glass of water unless your doctor tells you not to. Do not lie down right after you take it. · If you have a stent in your coronary artery, take your aspirin as your heart doctor says to. If another doctor says to stop taking the aspirin for any reason, talk to your heart doctor before you stop. · Do not chew or crush the coated or sustained-release forms of aspirin. · Ask your doctor if you can drink alcohol while you take aspirin. And ask how much you can drink. Too much alcohol with aspirin can cause stomach bleeding. · Do not take aspirin if you are pregnant, unless your doctor says it is okay. · Keep all aspirin out of children's reach. · Throw aspirin away if it starts to smell like vinegar. · Do not take aspirin if you have gout or if you take prescription blood thinners, unless your doctor has told you to. · Do not take prescription or over-the-counter medicines, vitamins, herbal products, or supplements without talking to your doctor first. Zoe Lópezt the label before you take another over-the-counter medicine.  Many contain aspirin. So they could cause you to take too much aspirin. · Talk with your doctor before you take a pain medicine. Ask which type of medicine you can take and how to take it safely with aspirin. · Tell your doctor or dentist before a surgery or procedure that you take aspirin. He or she will tell you if you should stop taking aspirin before your surgery or procedure. Make sure that you understand exactly what your doctor wants you to do. Where can you learn more? Go to http://etta-fer.info/. Enter S931 in the search box to learn more about \"Taking Aspirin to Prevent Heart Attack and Stroke: Care Instructions. \" Current as of: January 27, 2016 Content Version: 11.2 © 7020-4276 SoundOut. Care instructions adapted under license by [employer]ango (which disclaims liability or warranty for this information). If you have questions about a medical condition or this instruction, always ask your healthcare professional. [place] [address] any warranty or liability for your use of this information. Learning About Antiplatelet Medicines After a Stroke Introduction If you have had a stroke, you may have concerns about having another one. You want to do all you can do to avoid this. If your stroke was caused by a blood clot, one of the best things you can do is to take antiplatelet medicines. They can help prevent another stroke. In most cases, you don't take them if you had a stroke caused by a leak in an artery. These medicines are often called blood thinners. But they don't thin your blood. They work to keep platelets from sticking together and forming blood clots. (A platelet is a type of blood cell.) Blood clots can cause a stroke if they block a blood vessel in the brain. So by preventing blood clots, you are helping to prevent a stroke. Examples · Aspirin (Thao, Bufferin, Ecotrin) · Aspirin with dipyridamole (Aggrenox) · Clopidogrel (Plavix) Possible side effects These medicines make your blood take longer than normal to clot. This can cause bleeding, and you may bruise easily. In rare cases, they can cause you to bleed inside your body without an injury. If you have an injury, you might have bleeding that is hard to control. These medicines may have other side effects. Depending on which one you take, you may: 
· Have diarrhea. · Feel sick to your stomach. · Have a headache. · Have some mild belly pain. You may have other side effects or reactions not listed here. Check the information that comes with your medicine. What to know about taking this medicine · Be sure you get instructions about how to take your medicine safely. Blood thinners can cause serious bleeding problems. · Be safe with medicines. Take your medicines exactly as prescribed. Call your doctor if you think you are having a problem with your medicine. · Check with your doctor or pharmacist before you use any other medicines, including over-the-counter medicines. Make sure your doctor knows all of the medicines, vitamins, herbal products, and supplements you take. Taking some medicines together can cause problems. Where can you learn more? Go to http://ettaLittle Green Windmillfer.info/. Enter Q919 in the search box to learn more about \"Learning About Antiplatelet Medicines After a Stroke. \" Current as of: January 27, 2016 Content Version: 11.2 © 8857-9933 [employer]genta Technologies. Care instructions adapted under license by Ziftit (which disclaims liability or warranty for this information). If you have questions about a medical condition or this instruction, always ask your healthcare professional. [place] [address] any warranty or liability for your use of this information. Discharge Instructions Attachments/References STROKE (ENGLISH) TPA FOR STROKE: FIBRINOLYTIC THERAPY: GENERAL INFO (ENGLISH) Discharge Orders None Introducing hospitals & HEALTH SERVICES! Dear Sarah Perry: Thank you for requesting a Apparent account. Our records indicate that you already have an active Apparent account. You can access your account anytime at [email or web address] Did you know that you can access your hospital and ER discharge instructions at any time in Apparent? You can also review all of your test results from your hospital stay or ER visit. Additional Information If you have questions, please visit the Frequently Asked Questions section of the Apparent website at [email or web address] Remember, Apparent is NOT to be used for urgent needs. For medical emergencies, dial 911. Now available from your iPhone and Android! General Information Please provide this summary of care documentation to your next provider. Patient Signature:  ____________________________________________________________ Date:  ____________________________________________________________  
  
Elijah Sarkar Provider Signature:  ____________________________________________________________ Date:  ____________________________________________________________ More Information Stroke: Care Instructions Your Care Instructions You have had a stroke. This means that the blood flow to a part of your brain was blocked for some time, which damages the nerve cells in that part of the brain. The part of your body controlled by that part of your brain may not function properly now. The brain is an amazing organ that can heal itself to some degree. The stroke you had damaged part of your brain. But other parts of your brain may take over in some way for the damaged areas. You have already started this process. Your doctor will talk with you about what you can do to prevent another stroke.  High blood pressure, high cholesterol, and diabetes are all risk factors for stroke. If you have any of these conditions, work with your doctor to make sure they are under control. Other risk factors for stroke include being overweight, smoking, and not getting regular exercise. Going home may be hard for you and your family. The more you can try to do for yourself, the better. Remember to take each day one at a time. Follow-up care is a key part of your treatment and safety. Be sure to make and go to all appointments, and call your doctor if you are having problems. It's also a good idea to know your test results and keep a list of the medicines you take. How can you care for yourself at home? · Enter a stroke rehabilitation (rehab) program, if your doctor recommends it. Physical, speech, and occupational therapies can help you manage bathing, dressing, eating, and other basics of daily living. · Do not drive until your doctor says it is okay. · It is normal to feel sad or depressed after a stroke. If these feelings last, talk to your doctor. · If you are having problems with urine leakage, go to the bathroom at regular times, including when you first wake up and at bedtime. Also, limit fluids after dinner. · If you are constipated, drink plenty of fluids, enough so that your urine is light yellow or clear like water. If you have kidney, heart, or liver disease and have to limit fluids, talk with your doctor before you increase the amount of fluids you drink. Set up a regular time for using the toilet. If you continue to have constipation, your doctor may suggest using a bulking agent, such as Metamucil, or a stool softener, laxative, or enema. Medicines · Take your medicines exactly as prescribed. Call your doctor if you think you are having a problem with your medicine. You may be taking several medicines.  ACE (angiotensin-converting enzyme) inhibitors, angiotensin II receptor blockers (ARBs), beta-blockers, diuretics (water pills), and calcium channel blockers control your blood pressure. Statins help lower cholesterol. Your doctor may also prescribe medicines for depression, pain, sleep problems, anxiety, or agitation. · If your doctor has given you a blood thinner to prevent another stroke, be sure you get instructions about how to take your medicine safely. Blood thinners can cause serious bleeding problems. · Do not take any over-the-counter medicines or herbal products without talking to your doctor first. 
· If you take birth control pills or hormone therapy, talk to your doctor about whether they are right for you. For family members and caregivers · Make the home safe. Set up a room so that your loved one does not have to climb stairs. Be sure the bathroom is on the same floor. Move throw rugs and furniture that could cause falls. Make sure that the lighting is good. Put grab bars and seats in tubs and showers. · Find out what your loved one can do and what he or she needs help with. Try not to do things for your loved one that your loved one can do on his or her own. Help him or her learn and practice new skills. · Visit and talk with your loved one often. Try doing activities together that you both enjoy, such as playing cards or board games. Keep in touch with your loved one's friends as much as you can. Encourage them to visit. · Take care of yourself. Do not try to do everything yourself. Ask other family members to help. Eat well, get enough rest, and take time to do things that you enjoy. Keep up with your own doctor visits, and make sure to take your medicines regularly. Get out of the house as much as you can. Join a local support group. Find out if you qualify for home health care visits to help with rehab or for adult day care. When should you call for help? Call 911 anytime you think you may need emergency care. For example, call if: 
· You have signs of another stroke. These may include: ¨ Sudden numbness, tingling, weakness, or loss of movement in your face, arm, or leg, especially on only one side of your body. ¨ Sudden vision changes. ¨ Sudden trouble speaking. ¨ Sudden confusion or trouble understanding simple statements. ¨ Sudden problems with walking or balance. ¨ A sudden, severe headache that is different from past headaches. Call 911 even if these symptoms go away in a few minutes. Call your doctor now or seek immediate medical care if: 
· You have new symptoms that may be related to your stroke, such as falls or trouble swallowing. Watch closely for changes in your health, and be sure to contact your doctor if you have any problems. Where can you learn more? Go to http://etta-fer.info/. Enter O779 in the search box to learn more about \"Stroke: Care Instructions. \" Current as of: June 4, 2016 Content Version: 11.2 © 0041-5995 GreenSand. Care instructions adapted under license by [employer] (which disclaims liability or warranty for this information). If you have questions about a medical condition or this instruction, always ask your healthcare professional. [place] [address] any warranty or liability for your use of this information. Learning About TPA Treatment for Stroke What is TPA? Tissue plasminogen activator, or TPA, is a medicine that dissolves blood clots fast. Most strokes are caused by a blood clot. This kind of stroke is called an ischemic (say \"qum-MTF-iwdr\") stroke. For an ischemic stroke, TPA can dissolve the clot quickly and help blood to flow normally again. This can help limit damage to the brain. TPA is given through a vein in your hand or arm. It travels through the bloodstream to the clot. This treatment is most often done in the hospital. 
Doctors try to give TPA within 3 hours after stroke symptoms start.  Some people may be helped if they are able to get this medicine within 4½ hours of their first symptoms. How does TPA help during a stroke? When a blood clot moves to a blood vessel in the brain, it blocks blood flow to that area and causes a stroke. Without blood and the oxygen it carries, that part of the brain starts to die. If blood supply isn't restored, permanent damage usually occurs. The body parts controlled by those damaged cells can't work, or function, as they should. This loss of function may be mild or severe. It may be short-term or lifelong. Treatment with TPA can improve recovery from a stroke, especially if it's given as soon as possible after the stroke happens. It can limit brain damage from a stroke by dissolving the blood clot. Without TPA to dissolve it, a blood clot in your brain is more likely to cause serious brain damage. In general, the less damage there is to the brain tissue, the less disability a stroke causes. And with less damage to brain tissue, you are more likely to recover from the stroke. No treatment can guarantee a full recovery from a stroke. But TPA does improve your chances of having less or no disability after a stroke. What are the risks of TPA? The main risk of TPA is that it can cause serious bleeding in the body. If bleeding happens in the brain, it can cause worse stroke symptoms or even death. Doctors are very careful about using TPA. For example, a brain scan is done before TPA treatment to make sure you have no signs of bleeding. (Treatment with TPA would make any bleeding worse.) And after treatment, you are closely watched for some time to make sure you have no internal bleeding. Is there another treatment choice? When your doctor tells you that TPA treatment is a choice for you, it's because he or she believes that TPA offers you the best chance of recovery from stroke. But because TPA also has risks, it is up to you to choose it or not. Based on your condition, your doctor will explain what other choices you have for treatment. They may include other medicines that stop the clot from getting bigger. With or without TPA treatment, you will have care to help you get better. And you'll have medicine to prevent blood clots and control symptoms. Having a stroke can make it hard to make quick and complex decisions. Feeling afraid or anxious can make it even harder to think clearly. Your hospital staff understands this. They will explain your choices, answer your questions, and help you decide about your treatment. Follow-up care is a key part of your treatment and safety. Be sure to make and go to all appointments, and call your doctor if you are having problems. It's also a good idea to know your test results and keep a list of the medicines you take. Where can you learn more? Go to http://etta-fer.info/. Enter T082 in the search box to learn more about \"Learning About TPA Treatment for Stroke. \" Current as of: August 22, 2016 Content Version: 11.2 © 1410-7282 Evoinfinity[employer]. Care instructions adapted under license by [employer] (which disclaims liability or warranty for this information). If you have questions about a medical condition or this instruction, always ask your healthcare professional. [address] any warranty or liability for your use of this information.

## 2017-04-24 NOTE — Clinical Note
Status[de-identified] Inpatient [101] Type of Bed: Intensive Care [6] Inpatient Hospitalization Certified Necessary for the Following Reasons: 8. Other (further clarification in H&P documentation) Admitting Diagnosis: Acute CVA (cerebrovascular accident) ([facility] [[id number]] Admitting Diagnosis: Facial droop [[id number]] Admitting Diagnosis: Left arm weakness [[id number]] Admitting Physician: Oswaldo Arteaga [[id number]] Attending Physician: Oswaldo Arteaga [[id number]] Estimated Length of Stay: > or = to 2 Midnights Discharge Plan[de-identified] Home with Office Follow-up

## 2017-04-25 ENCOUNTER — APPOINTMENT (OUTPATIENT)
Dept: MRI IMAGING | Age: 47
DRG: 045 | End: 2017-04-25
Attending: PSYCHIATRY & NEUROLOGY
Payer: MEDICAID

## 2017-04-25 ENCOUNTER — APPOINTMENT (OUTPATIENT)
Dept: MRI IMAGING | Age: 47
DRG: 045 | End: 2017-04-25
Attending: INTERNAL MEDICINE
Payer: MEDICAID

## 2017-04-25 LAB
ABO + RH BLD: NORMAL
ANION GAP BLD CALC-SCNC: 11 MMOL/L (ref 3–18)
APPEARANCE UR: CLEAR
APTT PPP: 36.1 SEC (ref 23–36.4)
APTT PPP: 38.4 SEC (ref 23–36.4)
APTT PPP: 38.8 SEC (ref 23–36.4)
BILIRUB UR QL: NEGATIVE
BLOOD GROUP ANTIBODIES SERPL: NORMAL
BUN SERPL-MCNC: 9 MG/DL (ref 7–18)
BUN/CREAT SERPL: 8 (ref 12–20)
CALCIUM SERPL-MCNC: 8.8 MG/DL (ref 8.5–10.1)
CHLORIDE SERPL-SCNC: 104 MMOL/L (ref 100–108)
CHOLEST SERPL-MCNC: 154 MG/DL
CK MB CFR SERPL CALC: 1.2 % (ref 0–4)
CK MB CFR SERPL CALC: 1.3 % (ref 0–4)
CK MB CFR SERPL CALC: 1.4 % (ref 0–4)
CK MB SERPL-MCNC: 2.1 NG/ML (ref 5–25)
CK MB SERPL-MCNC: 3.3 NG/ML (ref 5–25)
CK MB SERPL-MCNC: 3.6 NG/ML (ref 5–25)
CK SERPL-CCNC: 172 U/L (ref 39–308)
CK SERPL-CCNC: 246 U/L (ref 39–308)
CK SERPL-CCNC: 255 U/L (ref 39–308)
CO2 SERPL-SCNC: 24 MMOL/L (ref 21–32)
COLOR UR: YELLOW
CREAT SERPL-MCNC: 1.11 MG/DL (ref 0.6–1.3)
D DIMER PPP FEU-MCNC: 0.58 UG/ML(FEU)
D DIMER PPP FEU-MCNC: 0.85 UG/ML(FEU)
D DIMER PPP FEU-MCNC: 2.5 UG/ML(FEU)
ERYTHROCYTE [DISTWIDTH] IN BLOOD BY AUTOMATED COUNT: 12.9 % (ref 11.6–14.5)
EST. AVERAGE GLUCOSE BLD GHB EST-MCNC: 105 MG/DL
FIBRINOGEN PPP-MCNC: 220 MG/DL (ref 210–451)
FIBRINOGEN PPP-MCNC: 224 MG/DL (ref 210–451)
FIBRINOGEN PPP-MCNC: 224 MG/DL (ref 210–451)
GLUCOSE BLD STRIP.AUTO-MCNC: 132 MG/DL (ref 70–110)
GLUCOSE SERPL-MCNC: 117 MG/DL (ref 74–99)
GLUCOSE UR STRIP.AUTO-MCNC: NEGATIVE MG/DL
HBA1C MFR BLD: 5.3 % (ref 4.5–5.6)
HCT VFR BLD AUTO: 42 % (ref 36–48)
HDLC SERPL-MCNC: 30 MG/DL (ref 40–60)
HDLC SERPL: 5.1 {RATIO} (ref 0–5)
HGB BLD-MCNC: 14.3 G/DL (ref 13–16)
HGB UR QL STRIP: NEGATIVE
INR PPP: 1.1 (ref 0.8–1.2)
INR PPP: 1.2 (ref 0.8–1.2)
KETONES UR QL STRIP.AUTO: NEGATIVE MG/DL
LDLC SERPL CALC-MCNC: 57.2 MG/DL (ref 0–100)
LEUKOCYTE ESTERASE UR QL STRIP.AUTO: NEGATIVE
LIPID PROFILE,FLP: ABNORMAL
MCH RBC QN AUTO: 35.9 PG (ref 24–34)
MCHC RBC AUTO-ENTMCNC: 34 G/DL (ref 31–37)
MCV RBC AUTO: 105 FL (ref 74–97)
NITRITE UR QL STRIP.AUTO: NEGATIVE
PH UR STRIP: 7 [PH] (ref 5–8)
PLATELET # BLD AUTO: 235 K/UL (ref 135–420)
PMV BLD AUTO: 10.8 FL (ref 9.2–11.8)
POTASSIUM SERPL-SCNC: 4.7 MMOL/L (ref 3.5–5.5)
PROT UR STRIP-MCNC: NEGATIVE MG/DL
PROTHROMBIN TIME: 13.5 SEC (ref 11.5–15.2)
PROTHROMBIN TIME: 13.8 SEC (ref 11.5–15.2)
PROTHROMBIN TIME: 13.8 SEC (ref 11.5–15.2)
PROTHROMBIN TIME: 14.4 SEC (ref 11.5–15.2)
RBC # BLD AUTO: 3.98 M/UL (ref 4.7–5.5)
SODIUM SERPL-SCNC: 139 MMOL/L (ref 136–145)
SP GR UR REFRACTOMETRY: 1.01 (ref 1–1.03)
SPECIMEN EXP DATE BLD: NORMAL
TRIGL SERPL-MCNC: 334 MG/DL (ref ?–150)
TROPONIN I SERPL-MCNC: <0.02 NG/ML (ref 0–0.06)
TSH SERPL DL<=0.05 MIU/L-ACNC: 3.2 UIU/ML (ref 0.36–3.74)
UROBILINOGEN UR QL STRIP.AUTO: 1 EU/DL (ref 0.2–1)
VLDLC SERPL CALC-MCNC: 66.8 MG/DL
WBC # BLD AUTO: 7.5 K/UL (ref 4.6–13.2)

## 2017-04-25 PROCEDURE — 74011250636 HC RX REV CODE- 250/636: Performed by: INTERNAL MEDICINE

## 2017-04-25 PROCEDURE — 85610 PROTHROMBIN TIME: CPT | Performed by: INTERNAL MEDICINE

## 2017-04-25 PROCEDURE — A9579 GAD-BASE MR CONTRAST NOS,1ML: HCPCS | Performed by: INTERNAL MEDICINE

## 2017-04-25 PROCEDURE — 93306 TTE W/DOPPLER COMPLETE: CPT

## 2017-04-25 PROCEDURE — 70551 MRI BRAIN STEM W/O DYE: CPT

## 2017-04-25 PROCEDURE — 93005 ELECTROCARDIOGRAM TRACING: CPT

## 2017-04-25 PROCEDURE — 74011250637 HC RX REV CODE- 250/637: Performed by: HOSPITALIST

## 2017-04-25 PROCEDURE — 80061 LIPID PANEL: CPT | Performed by: INTERNAL MEDICINE

## 2017-04-25 PROCEDURE — 86900 BLOOD TYPING SEROLOGIC ABO: CPT | Performed by: INTERNAL MEDICINE

## 2017-04-25 PROCEDURE — 85027 COMPLETE CBC AUTOMATED: CPT | Performed by: INTERNAL MEDICINE

## 2017-04-25 PROCEDURE — 74011636320 HC RX REV CODE- 636/320: Performed by: INTERNAL MEDICINE

## 2017-04-25 PROCEDURE — 74011000250 HC RX REV CODE- 250

## 2017-04-25 PROCEDURE — 82962 GLUCOSE BLOOD TEST: CPT

## 2017-04-25 PROCEDURE — 85384 FIBRINOGEN ACTIVITY: CPT | Performed by: INTERNAL MEDICINE

## 2017-04-25 PROCEDURE — 85730 THROMBOPLASTIN TIME PARTIAL: CPT | Performed by: INTERNAL MEDICINE

## 2017-04-25 PROCEDURE — 84443 ASSAY THYROID STIM HORMONE: CPT | Performed by: INTERNAL MEDICINE

## 2017-04-25 PROCEDURE — 82550 ASSAY OF CK (CPK): CPT | Performed by: INTERNAL MEDICINE

## 2017-04-25 PROCEDURE — 70544 MR ANGIOGRAPHY HEAD W/O DYE: CPT

## 2017-04-25 PROCEDURE — 65610000006 HC RM INTENSIVE CARE

## 2017-04-25 PROCEDURE — 77010033678 HC OXYGEN DAILY

## 2017-04-25 PROCEDURE — 80048 BASIC METABOLIC PNL TOTAL CA: CPT | Performed by: INTERNAL MEDICINE

## 2017-04-25 PROCEDURE — 83036 HEMOGLOBIN GLYCOSYLATED A1C: CPT | Performed by: INTERNAL MEDICINE

## 2017-04-25 PROCEDURE — 81003 URINALYSIS AUTO W/O SCOPE: CPT | Performed by: INTERNAL MEDICINE

## 2017-04-25 PROCEDURE — 92610 EVALUATE SWALLOWING FUNCTION: CPT

## 2017-04-25 PROCEDURE — 85379 FIBRIN DEGRADATION QUANT: CPT | Performed by: INTERNAL MEDICINE

## 2017-04-25 PROCEDURE — 74011250637 HC RX REV CODE- 250/637: Performed by: INTERNAL MEDICINE

## 2017-04-25 PROCEDURE — 77030021566 MRA NECK W CONT

## 2017-04-25 RX ORDER — SODIUM CHLORIDE 9 MG/ML
INJECTION INTRAMUSCULAR; INTRAVENOUS; SUBCUTANEOUS
Status: COMPLETED
Start: 2017-04-25 | End: 2017-04-25

## 2017-04-25 RX ORDER — ONDANSETRON 2 MG/ML
4 INJECTION INTRAMUSCULAR; INTRAVENOUS
Status: COMPLETED | OUTPATIENT
Start: 2017-04-25 | End: 2017-04-25

## 2017-04-25 RX ORDER — PANTOPRAZOLE SODIUM 40 MG/1
40 TABLET, DELAYED RELEASE ORAL
Status: DISCONTINUED | OUTPATIENT
Start: 2017-04-25 | End: 2017-04-26 | Stop reason: HOSPADM

## 2017-04-25 RX ORDER — SODIUM CHLORIDE 9 MG/ML
9 INJECTION INTRAMUSCULAR; INTRAVENOUS; SUBCUTANEOUS ONCE
Status: COMPLETED | OUTPATIENT
Start: 2017-04-25 | End: 2017-04-25

## 2017-04-25 RX ORDER — SODIUM CHLORIDE 9 MG/ML
75 INJECTION, SOLUTION INTRAVENOUS CONTINUOUS
Status: DISCONTINUED | OUTPATIENT
Start: 2017-04-25 | End: 2017-04-26

## 2017-04-25 RX ADMIN — SODIUM CHLORIDE 75 ML/HR: 900 INJECTION, SOLUTION INTRAVENOUS at 14:31

## 2017-04-25 RX ADMIN — PANTOPRAZOLE SODIUM 40 MG: 40 TABLET, DELAYED RELEASE ORAL at 08:12

## 2017-04-25 RX ADMIN — Medication 5 ML: at 14:32

## 2017-04-25 RX ADMIN — CITALOPRAM HYDROBROMIDE 20 MG: 20 TABLET ORAL at 16:53

## 2017-04-25 RX ADMIN — GADOPENTETATE DIMEGLUMINE 20 ML: 469.01 INJECTION INTRAVENOUS at 12:39

## 2017-04-25 RX ADMIN — ONDANSETRON 4 MG: 2 INJECTION INTRAMUSCULAR; INTRAVENOUS at 00:04

## 2017-04-25 RX ADMIN — Medication 1 CAPSULE: at 08:13

## 2017-04-25 RX ADMIN — SODIUM CHLORIDE 9 ML: 9 INJECTION INTRAMUSCULAR; INTRAVENOUS; SUBCUTANEOUS at 17:46

## 2017-04-25 RX ADMIN — SODIUM CHLORIDE 75 ML/HR: 900 INJECTION, SOLUTION INTRAVENOUS at 01:02

## 2017-04-25 RX ADMIN — TOPIRAMATE 50 MG: 25 TABLET, FILM COATED ORAL at 20:45

## 2017-04-25 RX ADMIN — RANOLAZINE 1000 MG: 500 TABLET, FILM COATED, EXTENDED RELEASE ORAL at 08:12

## 2017-04-25 RX ADMIN — SODIUM CHLORIDE 9 ML: 9 INJECTION, SOLUTION INTRAMUSCULAR; INTRAVENOUS; SUBCUTANEOUS at 17:46

## 2017-04-25 RX ADMIN — LORATADINE 10 MG: 10 TABLET ORAL at 08:14

## 2017-04-25 RX ADMIN — PANTOPRAZOLE SODIUM 40 MG: 40 TABLET, DELAYED RELEASE ORAL at 17:27

## 2017-04-25 RX ADMIN — ISOSORBIDE MONONITRATE 60 MG: 30 TABLET, EXTENDED RELEASE ORAL at 08:13

## 2017-04-25 RX ADMIN — Medication 5 ML: at 00:05

## 2017-04-25 RX ADMIN — RANOLAZINE 1000 MG: 500 TABLET, FILM COATED, EXTENDED RELEASE ORAL at 20:44

## 2017-04-25 RX ADMIN — TOPIRAMATE 50 MG: 25 TABLET, FILM COATED ORAL at 08:13

## 2017-04-25 NOTE — ED TRIAGE NOTES
Patient with complaints of numbness to the left side of his face while he was eating dinner 45 minutes ago. Patient states that he also has dizziness upon standing. Patient noted to have a left facial droop. Sepsis Screening completed    (  )Patient meets SIRS criteria. ( x )Patient does not meet SIRS criteria.       SIRS Criteria is achieved when two or more of the following are present   Temperature < 96.8°F (36°C) or > 100.9°F (38.3°C)   Heart Rate > 90 beats per minute   Respiratory Rate > 20 breaths per minute   WBC count > 12,000 or <4,000 or > 10% bands

## 2017-04-25 NOTE — PROGRESS NOTES
Orders received. Chart reviewed. Due to Rehab standards/stroke protocol pt is required to be on hold for 24hrs after receiving tPA. Will f/u w/ evaluation tomorrow when schedule allows.

## 2017-04-25 NOTE — ROUTINE PROCESS
TRANSFER - OUT REPORT:    Verbal report given to Marshall Betancourt, RN on Vishal Villegas  being transferred to ICU for routine progression of care       Report consisted of patients Situation, Background, Assessment and   Recommendations(SBAR). Information from the following report(s) Kardex, ED Summary, Procedure Summary, Intake/Output, MAR, Recent Results, Med Rec Status and Cardiac Rhythm SR was reviewed with the receiving nurse. Lines:   Peripheral IV 04/24/17 Left Antecubital (Active)   Site Assessment Clean, dry, & intact 4/24/2017  8:43 PM   Phlebitis Assessment 0 4/24/2017  8:43 PM   Infiltration Assessment 0 4/24/2017  8:43 PM   Dressing Status Clean, dry, & intact 4/24/2017  8:43 PM   Dressing Type Transparent 4/24/2017  8:43 PM       Peripheral IV 04/24/17 Right Antecubital (Active)   Site Assessment Clean, dry, & intact 4/24/2017  8:30 PM   Phlebitis Assessment 0 4/24/2017  8:30 PM   Infiltration Assessment 0 4/24/2017  8:30 PM   Dressing Status Clean, dry, & intact 4/24/2017  8:30 PM        Opportunity for questions and clarification was provided.       Patient transported with:   Monitor  O2 @ 2 liters  Registered Nurse  [facility]

## 2017-04-25 NOTE — PROGRESS NOTES
0101 Assessment completed. See flowsheet. VSS. Oriented to room and call bell system. NIH Stroke scale completed, see additional flowsheet. Neuro checks to be completed q30 minutes. Patient states facial tingling has subsided. Denies pain other than HA that is his baseline. Education given to patient to alert staff immediately if HA increases past baseline. Voices understanding. LUE flaccid and LLE weakness from prior stroke. Family updated on status, now at bedside. IVFs infusing without difficulty. Will continue to monitor. 0155 In route to MRI.     0245 Transferred back to room from MRI. Neuro checks remain unchanged. See flowsheet. VSS. NAD. Resting.    0315 Reassessment completed. See flowsheet. NAD. VSS.    0500 No change in neuro status. Resting comfortably. VSS. NAD.     0630 Dr. Anshu Vigil at bedside assessing patient. No neuro changes. VSS. NAD.     0725 Bedside and Verbal shift change report given to LU Roberts RN (oncoming nurse) by Emiliano Mackey RN   (offgoing nurse). Report included the following information SBAR, Kardex and MAR.

## 2017-04-25 NOTE — ED NOTES
Pt states nausea is better, continues to c/o headache \"that's always there, but it's tolerable. \"  Pt declined orders for Tylenol. VSS, will continue to monitor. Wife remains at bedside, call bell within reach, additional pillow placed under LUE.

## 2017-04-25 NOTE — PROGRESS NOTES
Speech Therapy Screening:  Services are indicated and therapy order is requested. An InBasket screening referral was triggered for speech therapy based on results obtained during the nursing admission assessment. The patients chart was reviewed and the patient is appropriate for a skilled therapy evaluation. Please order a consult for speech therapy if you are in agreement and would like an evaluation to be completed. Thank you.     MATT JohnsS.Luis Felipe., CCC/SLP

## 2017-04-25 NOTE — ED NOTES
Pt c/o dizziness and numbness to left side of face approx 1.5 hrs PTA after eating dinner and while sitting down. Pt wife states that pt has had previous TIA's and a stroke in 2011 while admitted at [facility] and affected his speech and left side side of body. Pt wife stated that pt regained speech and ability to walk with PT/OT, however did not regain movement to LUE and wears a sling. Pt states he uses a cane to assist with ambulation. Pt c/o \"weakness and shakiness\" to LLE that is different from baseline weakness r/t stroke deficits. Pt denies CP, SOB, N/V, recent illness.

## 2017-04-25 NOTE — H&P
History & Physical    Patient: Pinky Tabares MRN: [id number]  CSN: [id number]    YOB: 1970  Age: 48 y.o. 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 Acute CVA s/p tPA  2. Left Sided Facial Numbness/Tingling/droop; improving   3. Left Sided Weakness of UE and LE, chronic  4. CAD s/p PCI  5. HLD, allergic to statin  6. Chronic Headache, possible Cluster  7. Hx of CVA in 2011  8. Depression  FULL CODE     -admit to the ICU for closer monitoring, he is s/p tPA at this time  -evaluated by teleneuro, also Dr Marylen Fender notified   -CT head is neg for acute bleed, will order MRI Brain for now, and then MRA head/neck if needed   -check A1c, HLD, tsh  -trend cardiac enzymes  -CVA/s/p tPA protocol initiated. Obtain repeat imaging stat if neurologic changes noted   -bedside dysphagia screen, bedrest, neurochecks   -cont his home meds. Will hold plavix for now.    -SCDs        CC: Left sided facial numbness       HPI:     Pinky Tabares is a 48 y.o. male who with hx of prior stroke in 2011 (residual weakness of UE>LE left side), HLD, CAD comes to the hospital with complaints of left sided facial numbness. Tonight around 7:30pm after patient got done eating dinner he felt dizzy while sitting on the chair along with left sided facial numbness and tingling. His dizziness lasted for about 1 min but his other symptoms persisted. He didn't feel right so notified his wife right away who brought him to the ED. In the ED Code S was called. CT head was neg for anything acute and was evaluated by the teleneurologist. Patient was deemed to be a tPA candidate therefore it was administered. When I arrived at beside patient states he was feeling little better and no other new complaints. Wife tells me patient is allergic to statins therefore was placed on drug called Repatha for his HLD. He was enrolled in that trial last year for one year and stopped taking it back in Dec 2016 as its free trial period had . Since then up until earlier this month he wasn't taking anything but started it 3 weeks ago as his insurance is able to provide some coverage. He is complaint with rest of his medications.      FULL CODE     Past Medical History:   Diagnosis Date    CAD (coronary artery disease)     GERD (gastroesophageal reflux disease)     Headache     Hypertension     Stroke ([facility] [address]        Past Surgical History:   Procedure Laterality Date    CARDIAC SURG PROCEDURE UNLIST      HX CORONARY STENT PLACEMENT         Family History   Problem Relation Age of Onset    Heart Disease Mother     Hypertension Mother     Diabetes Father     Heart Disease Father        Social History     Social History    Marital status:      Spouse name: N/A    Number of children: 0    Years of education: N/A     Occupational History    disabled Not Employed     Social History Main Topics    Smoking status: Former Smoker     Packs/day: 0.30     Years: 30.00     Types: Cigarettes     Quit date: 2011    Smokeless tobacco: Never Used    Alcohol use No    Drug use: No    Sexual activity: Not Currently     Other Topics Concern    None     Social History Narrative    Lives on his own friends and relatives look in on him. Prior to Admission medications    Medication Sig Start Date End Date Taking? Authorizing Provider   metoprolol tartrate (LOPRESSOR) 25 mg tablet Take 0.5 Tabs by mouth nightly. 12/20/16  Yes Marcelina Hutton MD   topiramate (TOPAMAX) 50 mg tablet Take 1 Tab by mouth two (2) times a day. 11/8/16  Yes Marcelina Hutton MD   citalopram (CELEXA) 20 mg tablet TAKE ONE & ONE-HALF TABLETS BY MOUTH ONCE DAILY 8/1/16  Yes Marcelina Hutton MD   clopidogrel (PLAVIX) 75 mg tablet Take 1 Tab by mouth daily. 8/1/16  Yes Marcelina Hutton MD   pantoprazole (PROTONIX) 20 mg tablet TAKE ONE TABLET BY MOUTH TWICE DAILY 8/1/16  Yes Marcelina Hutton MD   isosorbide mononitrate ER (IMDUR) 60 mg CR tablet TAKE ONE TABLET BY MOUTH IN THE MORNING 5/13/16  Yes Marcelina Hutton MD   Ranolazine (RANEXA) 1,000 mg Tb12 Take 1 Tab by mouth two (2) times a day. 5/4/16  Yes Marcelina Hutton MD   omega-3 fatty acids-vitamin e (FISH OIL) 1,000 mg cap Take 1 Cap by mouth. Yes Historical Provider   fexofenadine (ALLEGRA) 180 mg tablet Take  by mouth daily. Yes Historical Provider   SUMAtriptan (IMITREX) 100 mg tablet Take 1 Tab by mouth once as needed for Migraine. 11/8/16   Marcelina Hutton MD   evolocumab (REPATHA SURECLICK) pen injection by SubCUTAneous route. Historical Provider       Allergies   Allergen Reactions    Crestor [Rosuvastatin] Rash    Iodinated Contrast Media - Oral And Iv Dye Hives    Statins-Hmg-Coa Reductase Inhibitors Myalgia       Review of Systems  Gen: No fever, chills, malaise, weight loss/gain. Heent: No headache, rhinorrhea, epistaxis, ear pain, hearing loss, sinus pain, neck pain/stiffness, sore throat. Heart: No chest pain, palpitations, BERUMEN, pnd, or orthopnea. Resp: No cough, hemoptysis, wheezing and shortness of breath. GI: No nausea, vomiting, diarrhea, constipation, melena or hematochezia.    : No urinary obstruction, dysuria or hematuria. Derm: No rash, new skin lesion or pruritis. Musc/skeletal: no bone or joint complains. Vasc: No edema, cyanosis or claudication. Endo: No heat/cold intolerance, no polyuria,polydipsia or polyphagia. Neuro: No unilateral weakness, numbness, tingling. No seizures. Heme: No easy bruising or bleeding. Physical Exam:     Physical Exam:  Visit Vitals    /74 (BP 1 Location: Right arm, BP Patient Position: At rest)    Pulse 72    Temp 97.8 °F (36.6 °C)    Resp 22    Ht 5' 10\" (1.778 m)    Wt 126.1 kg (278 lb)    SpO2 98%    BMI 39.89 kg/m2    O2 Flow Rate (L/min): 2 l/min O2 Device: Nasal cannula    Temp (24hrs), Av.8 °F (36.6 °C), Min:97.8 °F (36.6 °C), Max:97.8 °F (36.6 °C)             General:  Awake, cooperative, no distress. Head:  Normocephalic, without obvious abnormality, atraumatic. Eyes:  Conjunctivae/corneas clear, sclera anicteric, PERRL, EOMs intact. Nose: Nares normal. No drainage or sinus tenderness. Throat: Lips, mucosa, and tongue normal.    Neck: Supple, symmetrical, trachea midline, no adenopathy. Lungs:   Clear to auscultation bilaterally. Heart:  Regular rate and rhythm, S1, S2 normal, no murmur, click, rub or gallop. Abdomen: Soft, non-tender. Bowel sounds normal. No masses,  No organomegaly. Extremities: Extremities normal, atraumatic, no cyanosis or edema. Capillary refill normal.   Pulses: 2+ and symmetric all extremities. Skin: Skin color pink/pale/mottled/flushed, turgor normal. No rashes or lesions   Neurologic: Decreased sensation on left lower face, rest of the CN intact. LUE strength 2/5 and LLE 4/5 (chronic), no other deficit.  AAOx3       Labs Reviewed:    Recent Results (from the past 24 hour(s))   GLUCOSE, POC    Collection Time: 17  8:24 PM   Result Value Ref Range    Glucose (POC) 158 (H) 70 - 110 mg/dL   CBC WITH AUTOMATED DIFF    Collection Time: 17  8:30 PM   Result Value Ref Range    WBC 6.8 4.6 - 13.2 K/uL    RBC 4.29 (L) 4.70 - 5.50 M/uL    HGB 15.5 13.0 - 16.0 g/dL    HCT 40.9 36.0 - 48.0 %    MCV 95.3 74.0 - 97.0 FL    MCH 36.1 (H) 24.0 - 34.0 PG    MCHC 37.9 (H) 31.0 - 37.0 g/dL    RDW 13.0 11.6 - 14.5 %    PLATELET 214 535 - 076 K/uL    MPV 10.0 9.2 - 11.8 FL    NEUTROPHILS 52 40 - 73 %    LYMPHOCYTES 39 21 - 52 %    MONOCYTES 8 3 - 10 %    EOSINOPHILS 1 0 - 5 %    BASOPHILS 0 0 - 2 %    ABS. NEUTROPHILS 3.6 1.8 - 8.0 K/UL    ABS. LYMPHOCYTES 2.7 0.9 - 3.6 K/UL    ABS. MONOCYTES 0.5 0.05 - 1.2 K/UL    ABS. EOSINOPHILS 0.1 0.0 - 0.4 K/UL    ABS. BASOPHILS 0.0 0.0 - 0.06 K/UL    DF AUTOMATED     METABOLIC PANEL, COMPREHENSIVE    Collection Time: 04/24/17  8:30 PM   Result Value Ref Range    Sodium 140 136 - 145 mmol/L    Potassium 3.6 3.5 - 5.5 mmol/L    Chloride 104 100 - 108 mmol/L    CO2 26 21 - 32 mmol/L    Anion gap 10 3.0 - 18 mmol/L    Glucose 119 (H) 74 - 99 mg/dL    BUN 10 7.0 - 18 MG/DL    Creatinine 1.25 0.6 - 1.3 MG/DL    BUN/Creatinine ratio 8 (L) 12 - 20      GFR est AA >60 >60 ml/min/1.73m2    GFR est non-AA >60 >60 ml/min/1.73m2    Calcium 8.7 8.5 - 10.1 MG/DL    Bilirubin, total 0.5 0.2 - 1.0 MG/DL    ALT (SGPT) 36 16 - 61 U/L    AST (SGOT) 24 15 - 37 U/L    Alk.  phosphatase 65 45 - 117 U/L    Protein, total 8.0 6.4 - 8.2 g/dL    Albumin 4.0 3.4 - 5.0 g/dL    Globulin 4.0 2.0 - 4.0 g/dL    A-G Ratio 1.0 0.8 - 1.7     MAGNESIUM    Collection Time: 04/24/17  8:30 PM   Result Value Ref Range    Magnesium 1.9 1.6 - 2.6 mg/dL   CARDIAC PANEL,(CK, CKMB & TROPONIN)    Collection Time: 04/24/17  8:30 PM   Result Value Ref Range     39 - 308 U/L    CK - MB 2.9 <3.6 ng/ml    CK-MB Index 1.5 0.0 - 4.0 %    Troponin-I, Qt. <0.02 0.00 - 0.06 NG/ML   PROTHROMBIN TIME + INR    Collection Time: 04/24/17  8:30 PM   Result Value Ref Range    Prothrombin time 12.9 11.5 - 15.2 sec    INR 1.0 0.8 - 1.2     PTT    Collection Time: 04/24/17  8:30 PM   Result Value Ref Range    aPTT 46.0 (H) 23.0 - 36.4 SEC   EKG, 12 LEAD, INITIAL    Collection Time: 04/24/17  9:43 PM   Result Value Ref Range    Ventricular Rate 73 BPM    Atrial Rate 73 BPM    P-R Interval 160 ms    QRS Duration 110 ms    Q-T Interval 418 ms    QTC Calculation (Bezet) 460 ms    Calculated P Axis 19 degrees    Calculated R Axis -46 degrees    Calculated T Axis 62 degrees    Diagnosis       Normal sinus rhythm  Left anterior fascicular block  Nonspecific T wave abnormality  Prolonged QT  Abnormal ECG  When compared with ECG of 31-AUG-2014 15:30,  T wave inversion more evident in Anterior leads         Procedures/imaging: see electronic medical records for all procedures/Xrays and details which were not copied into this note but were reviewed prior to creation of Plan    60 minutes of critical care time spent in the direct evaluation and treatment of this high risk patient. The reason for providing this level of medical care for this critically ill patient was due a critical illness that impaired one or more vital organ systems such that there was a high probability of imminent or life threatening deterioration in the patients condition. This care involved high complexity decision making to assess, manipulate, and support vital system functions, to treat this degreee vital organ system failure and to prevent further life threatening deterioration of the patients condition.       CC: Parris Flores MD

## 2017-04-25 NOTE — CONSULTS
NEUROLOGY CONSULTATION NOTE    Patient: Ginger Kaufman MRN: [id number]  [facility]: [id number]    YOB: 1970  Age: 48 y.o. Sex: male    DOA: 4/24/2017 LOS:  LOS: 1 day        Requesting Physician: Dr. Curt Gama  Reason for Consultation: TIA vs Stroke              HISTORY OF PRESENT ILLNESS:   Ginger Kaufman is a 43-year-old gentleman with history of stroke in 2011, resulting in left arm and leg weakness, hyperlipidemia, migraines and CAD, who presented to hospital with increasing weakness on the left arm and numbness on the left face, left arm and left leg. He was feeling dizzy with the onset of the symptoms and when he presented to ER, he was within the time window and received tPA after being evaluated by tele-neurology. Head CT was negative for an acute stroke. In the past, the patient was on aspirin and clopidogrel, the aspirin was discontinued by her primary care physician. After the tPA, the patient started feeling better. Currently, he doesnt have any new symptoms. He has left arm>> left leg weakness. He tells me that he cannot move his left arm at all. He denies any speech or swallowing difficulties. He denies any visual changes. Stroke Work-up:  Brain MRI:  1. No acute infarct, hemorrhage, mass effect, or herniation. 2.  Localized right posterior superior basal ganglia/periventricular signal abnormality, suspect small chronic infarct with associated axonal degeneration. 3. Few scattered small T2/FLAIR hyperintense white matter foci. This finding is quite nonspecific and is not particularly suggestive of a demyelinating process. Similar findings may be seen in patient's of all ages. Similar findings can be seen with migraine headaches or very mild chronic small vessel changes. Head CT: Focal hypodensity in the right frontal periventricular white matter, possibly a small focus of chronic microvascular ischemic change or a small age-indeterminate lacunar infarct.  No CT evidence of large territorial infarct or hemorrhage. .  CTA of head and neck: pending  Echocardiogram: pending  Lipid panel:   Lab Results   Component Value Date/Time    Cholesterol, total 154 04/25/2017 12:15 AM    HDL Cholesterol 30 04/25/2017 12:15 AM    LDL,Direct 70 07/26/2016 09:43 AM    LDL, calculated 57.2 04/25/2017 12:15 AM    VLDL, calculated 66.8 04/25/2017 12:15 AM    Triglyceride 334 04/25/2017 12:15 AM    CHOL/HDL Ratio 5.1 04/25/2017 12:15 AM     HbA1c:   Lab Results   Component Value Date/Time    Hemoglobin A1c 5.3 04/25/2017 12:15 AM       PAST MEDICAL HISTORY:  Past Medical History:   Diagnosis Date    CAD (coronary artery disease)     GERD (gastroesophageal reflux disease)     Headache     Hypertension     Stroke ([facility] [address] 2011     PAST SURGICAL HISTORY:  Past Surgical History:   Procedure Laterality Date    CARDIAC SURG PROCEDURE UNLIST      HX CORONARY STENT PLACEMENT       FAMILY HISTORY:  Family History   Problem Relation Age of Onset    Heart Disease Mother     Hypertension Mother     Diabetes Father     Heart Disease Father      SOCIAL HISTORY:  Social History     Social History    Marital status:      Spouse name: N/A    Number of children: 0    Years of education: N/A     Occupational History    disabled Not Employed     Social History Main Topics    Smoking status: Former Smoker     Packs/day: 0.30     Years: 30.00     Types: Cigarettes     Quit date: 9/19/2011    Smokeless tobacco: Never Used    Alcohol use No    Drug use: No    Sexual activity: Not Currently     Other Topics Concern    None     Social History Narrative    Lives on his own friends and relatives look in on him.      MEDICATIONS:  Current Facility-Administered Medications   Medication Dose Route Frequency    0.9% sodium chloride infusion  75 mL/hr IntraVENous CONTINUOUS    ELECTROLYTE REPLACEMENT PROTOCOL  1 Each Other PRN    ELECTROLYTE REPLACEMENT PROTOCOL  1 Each Other PRN    ELECTROLYTE REPLACEMENT PROTOCOL 1 Each Other PRN    sodium chloride (NS) flush 5 mL  5 mL IntraVENous Q8H    sodium chloride (NS) flush 5 mL  5 mL IntraVENous PRN    labetalol (NORMODYNE;TRANDATE) injection 10 mg  10 mg IntraVENous Q10MIN PRN    loratadine (CLARITIN) tablet 10 mg  10 mg Oral DAILY    fish oil-omega-3 fatty acids 340-1,000 mg capsule 1 Cap  1 Cap Oral DAILY    ranolazine ER (RANEXA) tablet 1,000 mg  1,000 mg Oral BID    isosorbide mononitrate ER (IMDUR) tablet 60 mg  60 mg Oral DAILY    evolocumab- nursing and pharmacy to clarify dosing and frequency   Other DAILY    citalopram (CELEXA) tablet 20 mg  20 mg Oral DAILY    pantoprazole (PROTONIX) tablet 40 mg  40 mg Oral ACB    topiramate (TOPAMAX) tablet 50 mg  50 mg Oral BID    SUMAtriptan (IMITREX) tablet 100 mg  100 mg Oral ONCE PRN    metoprolol tartrate (LOPRESSOR) tablet 12.5 mg  12.5 mg Oral QHS    PHARMACY INFORMATION NOTE  1 Each Other DAILY    ondansetron (ZOFRAN) injection 1 mg  1 mg IntraVENous Q6H PRN    labetalol (NORMODYNE;TRANDATE) injection 5 mg  5 mg IntraVENous Q15MIN PRN    acetaminophen (TYLENOL) tablet 650 mg  650 mg Oral Q4H PRN    acetaminophen (TYLENOL) suppository 650 mg  650 mg Rectal Q4H PRN    docusate sodium (COLACE) capsule 100 mg  100 mg Oral BID    bisacodyl (DULCOLAX) tablet 5 mg  5 mg Oral DAILY PRN    bisacodyl (DULCOLAX) suppository 10 mg  10 mg Rectal DAILY PRN    albuterol (PROVENTIL VENTOLIN) nebulizer solution 2.5 mg  2.5 mg Nebulization Q4H PRN     Prior to Admission medications    Medication Sig Start Date End Date Taking? Authorizing Provider   metoprolol tartrate (LOPRESSOR) 25 mg tablet Take 0.5 Tabs by mouth nightly. 12/20/16  Yes Andrew Hui MD   topiramate (TOPAMAX) 50 mg tablet Take 1 Tab by mouth two (2) times a day.  11/8/16  Yes Andrew Hui MD   citalopram (CELEXA) 20 mg tablet TAKE ONE & ONE-HALF TABLETS BY MOUTH ONCE DAILY 8/1/16  Yes Andrew Hui MD   clopidogrel (PLAVIX) 75 mg tablet Take 1 Tab by mouth daily. 8/1/16  Yes Marcos Rodriguez MD   pantoprazole (PROTONIX) 20 mg tablet TAKE ONE TABLET BY MOUTH TWICE DAILY 8/1/16  Yes Marcos Rodriguez MD   isosorbide mononitrate ER (IMDUR) 60 mg CR tablet TAKE ONE TABLET BY MOUTH IN THE MORNING 5/13/16  Yes Marcos Rodriguez MD   Ranolazine (RANEXA) 1,000 mg Tb12 Take 1 Tab by mouth two (2) times a day. 5/4/16  Yes Marcos Rodriguez MD   omega-3 fatty acids-vitamin e (FISH OIL) 1,000 mg cap Take 1 Cap by mouth. Yes Historical Provider   fexofenadine (ALLEGRA) 180 mg tablet Take  by mouth daily. Yes Historical Provider   SUMAtriptan (IMITREX) 100 mg tablet Take 1 Tab by mouth once as needed for Migraine. 11/8/16   Marcos Rodriguez MD   evolocumab (REPATHA SURECLICK) pen injection by SubCUTAneous route. Historical Provider       ALLERGIES:  Allergies   Allergen Reactions    Crestor [Rosuvastatin] Rash    Iodinated Contrast Media - Oral And Iv Dye Hives    Statins-Hmg-Coa Reductase Inhibitors Myalgia       Review of Systems  GENERAL: No fevers or chills. HEENT: No change in vision, earache, tinnitus, sore throat or sinus congestion. NECK: No pain or stiffness. CARDIOVASCULAR: No chest pain or pressure. No palpitations. PULMONARY: No shortness of breath, cough or wheeze. GASTROINTESTINAL: No abdominal pain, nausea, vomiting or diarrhea. GENITOURINARY: No urinary frequency, urgency, hesitancy or dysuria. MUSCULOSKELETAL: No joint or muscle pain, no back pain, no recent trauma. DERMATOLOGIC: No rash, no itching, no lesions. ENDOCRINE: No polyuria, polydipsia, no heat or cold intolerance. No recent change in weight. HEMATOLOGICAL: No anemia or easy bruising or bleeding. NEUROLOGIC: No headache, seizures. See HPI.     PHYSICAL EXAMINATION:     Visit Vitals    /77    Pulse 63    Temp 98 °F (36.7 °C)    Resp 17    Ht 5' 10\" (1.778 m)    Wt 119.7 kg (263 lb 14.3 oz)    SpO2 97%    BMI 37.86 kg/m2    O2 Flow Rate (L/min): 2 l/min O2 Device: Nasal cannula  GENERAL: Pleasant, in no apparent distress. HEENT: Moist mucous membranes, sclerae anicteric, scalp is atraumatic. CVS: Regular rate and rhythm, no murmurs or gallops. No carotid bruits. PULMONARY: Clear to auscultation bilaterally. No rales or rhonchi. No wheezing. EXTREMITIES: Normal range of motion at all sites. No deformities. ABDOMEN: Soft, nontender. SKIN: No rashes or ecchymoses. Warm and dry. NEUROLOGIC: Alert and oriented x3. Speech is fluent without any aphasia or dysarthria. Cranial nerves: Face is symmetric with symmetric smile. Facial sensation is intact. Extraocular movements are intact with no nystagmus. Visual fields are full to confrontation. PERRL. Tongue is midline. Palate elevates symmetrically. Hearing intact to speech. Sternocleidomastoid and trapezius strengths are full bilaterally. Motor: Left arm is weak, 0/5, with left hand flexion contracture. Left leg strength is 4/5 with hip flexion/extension, knee flexion/extension and dorsiflexion/plantar flexion. Right-sided strength is intact. Sensory: Intact to pinprick and touch on all four. Length dependent sensory loss vibratory sensation on toes, L>>R, bilaterally. Coordination: Intact coordination with finger-nose-finger on right. Normal fine movements. Deep tendon reflexes: Slightly brisk reflexes on the left biceps, patella, brachioradialis. Otherwise, the reflexes seem to be intact. Ankle reflexes are absent.   Labs: Results:       Chemistry Recent Labs      04/25/17   0500  04/24/17 2030   GLU  117*  119*   NA  139  140   K  4.7  3.6   CL  104  104   CO2  24  26   BUN  9  10   CREA  1.11  1.25   CA  8.8  8.7   AGAP  11  10   BUCR  8*  8*   AP   --   65   TP   --   8.0   ALB   --   4.0   GLOB   --   4.0   AGRAT   --   1.0      CBC w/Diff Recent Labs      04/25/17   0500  04/24/17 2030   WBC  7.5  6.8   RBC  3.98*  4.29*   HGB  14.3  15.5   HCT  42.0  40.9   PLT  235  222   GRANS   --   52   LYMPH   --   39   EOS --   1      Cardiac Enzymes Recent Labs      04/25/17   0651  04/25/17   0015   CPK  246  172   CKND1  1.3  1.2      Coagulation Recent Labs      04/25/17   0500  04/25/17   0015  04/24/17   2030   PTP  13.8  13.8  12.9   INR  1.1  1.1  1.0   APTT   --   36.1  46.0*       Lipid Panel Lab Results   Component Value Date/Time    Cholesterol, total 154 04/25/2017 12:15 AM    HDL Cholesterol 30 04/25/2017 12:15 AM    LDL,Direct 70 07/26/2016 09:43 AM    LDL, calculated 57.2 04/25/2017 12:15 AM    VLDL, calculated 66.8 04/25/2017 12:15 AM    Triglyceride 334 04/25/2017 12:15 AM    CHOL/HDL Ratio 5.1 04/25/2017 12:15 AM      BNP No results for input(s): BNPP in the last 72 hours. Liver Enzymes Recent Labs      04/24/17 2030   TP  8.0   ALB  4.0   AP  65   SGOT  24      Thyroid Studies Lab Results   Component Value Date/Time    TSH 3.130 12/18/2014 12:04 PM          Radiology:  Mri Brain Wo Cont    Result Date: 4/25/2017  MRI brain History: Dizziness, left-sided facial numbness, prior CVA, possible acute CVA Comparison: No prior study Technique: Multiplanar multi sequence MR imaging of the brain was performed utilizing axial T2, FLAIR, diffusion, T2 gradient echo, coronal T2, and multiplanar T1 pre contrast imaging . No gadolinium was administered due to specific clinician request. Findings: The ventricles and sulci are normal in their size and configuration. Discrete T2/FLAIR hyperintense focus is present in the posterior superior right lentiform nucleus slightly extending into the corona radiata. There may been minimal central internal fluid signal. Slight ex vacuo dilatation of the right lateral ventricle suggested. Thin linear T2/FLAIR hyperintensity suggestive extending along the internal capsule into the cerebral peduncle suggesting Wallerian axonal degeneration.  A few scattered small T2/FLAIR hyperintense white matter lesions are seen within the periventricular and subcortical white matter, mostly in the frontal parietal regions. No discrete corpus callosal, brainstem, or posterior fossa lesion is seen. There is no evidence of mass effect, hemorrhage, midline shift, or herniation. There is no abnormal restricted diffusion to suggest acute infarct. No susceptibility artifact is seen to suggest intraparenchymal hemorrhage. The major intracranial vascular flow voids are grossly normal. The orbits, paranasal sinuses, and mastoid air cells are unremarkable. IMPRESSION: 1. No acute infarct, hemorrhage, mass effect, or herniation. 2.  Localized right posterior superior basal ganglia/periventricular signal abnormality, suspect small chronic infarct with associated axonal degeneration. 3. Few scattered small T2/FLAIR hyperintense white matter foci. This finding is quite nonspecific and is not particularly suggestive of a demyelinating process. Similar findings may be seen in patient's of all ages. Similar findings can be seen with migraine headaches or very mild chronic small vessel changes. Preliminary report of this study was provided by [facility]. Ct Head Wo Cont    Result Date: 4/24/2017  EXAMINATION:  CT head noncontrast COMPARISON: None HISTORY: Code S, stroke alert. TECHNIQUE: Noncontrasted axial images were obtained through the patient's brain from the vertex of the skull through the skull base. Bone and soft tissue windows were reviewed. One or more dose reduction techniques were used on this CT: automated exposure control, adjustment of the mAs and/or kVp according to patient's size, and iterative reconstruction techniques. The specific techniques utilized on this CT exam have been documented in the patient's electronic medical record. FINDINGS: No mass effect midline shift or intracranial hemorrhage. Small hypodensity in the right periventricular white matter could represent a focus of chronic small vessel ischemic change or perhaps a age indeterminate lacunar infarct.  No CT evidence of large territorial infarct appreciated. Ventricles normal in caliber and contour. The visualized osseous structures demonstrate no acute abnormality. The visualized paranasal sinuses and mastoid air cells are clear. IMPRESSION: Focal hypodensity in the right frontal periventricular white matter, possibly a small focus of chronic microvascular ischemic change or a small age-indeterminate lacunar infarct. No CT evidence of large territorial infarct or hemorrhage. Above code S results discussed with Dr. Toño Hylton in the ER at 8:40 PM.    ASSESSMENT/IMPRESSION:  The clinical presentation is suggestive of transient ischemic attack or a small stroke, which seems to have been abated with Alteplase. The patient would need further stroke workup to determine the optimal/maximal medical management for secondary stroke prevention. RECOMMENDATIONS:  1. Continue post tPA stroke protocol measures with frequent neuro checks  2. IV normal saline, keep blood pressure within normal limits  3. CTA of head and neck  4. Echocardiogram  5. hold aspirin or clopidogrel for now. Aspirin will be started after 24 hours of tPA  6. euglycemia and euthermia  7. continue ICU stay with telemetry monitoring      I will follow the patient.  Please do not hesitate to return with any questions.    ------------------------------------  Charly Beckham MD  4/25/2017  8:43 AM

## 2017-04-25 NOTE — INTERDISCIPLINARY ROUNDS
CRITICAL CARE INTERDISCIPLINARY ROUNDS    Patient Information:    Name:   Franky Allen    Age:   48 y.o. 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 ([address] I63.341    Headache R51    Episodic cluster headache, not intractable G44.019    Mixed hyperlipidemia E78.2    Coronary artery disease involving native coronary artery of native heart without angina pectoris I25.10    Hemiparesis affecting left side as late effect of cerebrovascular accident (CVA) ([address] Q25.842    Facial droop R29.810    Left arm weakness R29.898    Acute CVA (cerebrovascular accident) ([facility] [address] I63.9       Principal Problem:  Acute CVA (cerebrovascular accident) ([facility] [address]    During rounds the following quality care indicators and evidence based practices were addressed :  PUD Prophylaxis Melgoza Day na (M-Care na) ; Central Line Day:na Isolation:na; Antibiotic Stewardship: na; Probiotics Necessary: na    Ventilator Bundle:        Sepsis Order Set:    Glycemic Control:   Recent Labs      04/25/17   0500  04/24/17 2030   GLU  117*  119*   ; No results for input(s): PHI, PCO2I, PO2I in the last 72 hours.     No lab exists for component: 3 Adjustments     Acute MI/PCI:   Not applicable    Door to Balloon: Admission Time: 2020      Heart Failure:    Not applicable     SCIP Measures for other Surgeries:   Not applicable     Pneumonia:    Not applicable    Interdisciplinary team rounds were held with the following team membersDiabetes Treatment Specialist, Nursing, Pharmacy, Physician and Respiratory Therapy. Plan of care discussed. Goals of Care/ Recommendations:  CTA today, DVT prophylaxis can resume at 2300, recommend stopping POCT, review orders for fluid adjustment based on potassium     See clinical pathway and/or care plan for interventions and desired outcomes.     Critical Care Discharge Status:  Red

## 2017-04-25 NOTE — ROUTINE PROCESS
Bedside and Verbal shift change report given to Chani Morris RN (oncoming nurse) by Isaac Doll RN & Anuel Schaeffer RN (offgoing nurse).  Report included the following information SBAR, Kardex, ED Summary, Intake/Output, MAR, Recent Results, Med Rec Status and Cardiac Rhythm SR.

## 2017-04-25 NOTE — PROGRESS NOTES
OT orders received and chart reviewed. Pt received tPA at ~10pm on 4/24/17. Per protocol will hold OT evaluation x24 hours. OT to follow up with pt tomorrow as appropriate.     Amna Vergara MS OTR/L

## 2017-04-25 NOTE — ED NOTES
Activase gtt completed, pt states he \"feels better than when I got here. \"  Pt denies increased h/a from normal pain. Pt denies dizziness, states tingling to LLE decreased, however; \"still feel a little tingling, but not like when I was talking to the doctor (neurologist Dr. Debi Varner) and it felt like it was when your foot falls asleep. \"  Pt and wife updated on plan of care, and aware he will be admitted to ICU.

## 2017-04-25 NOTE — ED PROVIDER NOTES
HPI Comments:   8:22 PM   Rachel Zeng is a 48 y.o. Male with hx of stroke 6 years ago presents to ED C/O similar episode of numbness to left side of face onset 45 mins ago while eating dinner. Assx sx include dizziness upon standing up and left facial droop. Pt states previous episode, the numbness began in the face and radiated to left arm. Denies ha, chest pain, palpitations, n/v, visual problem, any pain. Other PMHx include CAD, GERD, HA, and HTN. PSHx include coronary stent placement. Pt denies any other sxs or complaints. The history is provided by the patient. No  was used. Written by PIYUSH Uribe, as dictated by Radha Guillen MD    Past Medical History:   Diagnosis Date    CAD (coronary artery disease)     GERD (gastroesophageal reflux disease)     Headache     Hypertension     Stroke ([facility] [address] 2011       Past Surgical History:   Procedure Laterality Date    CARDIAC SURG PROCEDURE UNLIST      HX CORONARY STENT PLACEMENT           Family History:   Problem Relation Age of Onset    Heart Disease Mother     Hypertension Mother     Diabetes Father     Heart Disease Father        Social History     Social History    Marital status:      Spouse name: N/A    Number of children: 0    Years of education: N/A     Occupational History    disabled Not Employed     Social History Main Topics    Smoking status: Former Smoker     Packs/day: 0.30     Years: 30.00     Types: Cigarettes     Quit date: 9/19/2011    Smokeless tobacco: Never Used    Alcohol use No    Drug use: No    Sexual activity: Not Currently     Other Topics Concern    Not on file     Social History Narrative    Lives on his own friends and relatives look in on him. ALLERGIES: Crestor [rosuvastatin];  Iodinated contrast media - oral and iv dye; and Statins-hmg-coa reductase inhibitors    Review of Systems   Neurological: Positive for dizziness, facial asymmetry (left sided) and numbness (left face). All other systems reviewed and are negative. Vitals:    04/24/17 2031 04/24/17 2039 04/24/17 2100   BP: (!) 189/106 (!) 150/92 131/74   Pulse: 73 73 72   Resp: 20 22 22   Temp: 97.8 °F (36.6 °C)     SpO2: 100% 99% 98%   Weight: 126.1 kg (278 lb)     Height: 5' 10\" (1.778 m)              Physical Exam   Constitutional: He is oriented to person, place, and time. He appears well-developed and well-nourished. Obese. HENT:   Head: Normocephalic and atraumatic. Right Ear: External ear normal.   Left Ear: External ear normal.   Nose: Nose normal.   Mouth/Throat: Oropharynx is clear and moist.   Eyes: Conjunctivae and EOM are normal. Pupils are equal, round, and reactive to light. Neck: Normal range of motion. Neck supple. No JVD present. No tracheal deviation present. No thyromegaly present. Cardiovascular: Normal rate, regular rhythm, normal heart sounds and intact distal pulses. Pulmonary/Chest: Effort normal and breath sounds normal.   Abdominal: Soft. Bowel sounds are normal. He exhibits no distension and no mass. There is no tenderness. No HSM   Musculoskeletal: Normal range of motion. He exhibits no edema or tenderness. Lymphadenopathy:     He has no cervical adenopathy. Neurological: He is alert and oriented to person, place, and time. He displays abnormal reflex. A cranial nerve deficit is present. He exhibits normal muscle tone. Coordination abnormal.    Left sided facial droop, moderate,  Vf wnl nery;  Left arm weakness 3- out of 5. [de-identified] old, with contracted left hand. LE: normal strength. dtr 2+ rt side, 3+ lue, 2+ lle. Sensory decreased left face and arm. fnf intact right, slight dysmetria right. Skin: Skin is warm and dry. Psychiatric: He has a normal mood and affect. His behavior is normal. Thought content normal.   Nursing note and vitals reviewed. RESULTS:  EKG interpretation: (Preliminary)  9:46 PM   NSR. 73 bpm. No STEMI.   EKG read by Sarita Charles I Obdulio Dunlap MD at 9:43 PM.     9:47 PM  RADIOLOGY FINDINGS  Chest X-ray shows Poor inspiration. No mediastinal widening. No infiltrate noted. No effusion. Pending review by Radiologist  Recorded by Obed Cordero ED Scribe, as dictated by Pham Trinidad MD     CT HEAD WO CONT   Final Result      XR CHEST PORT    (Results Pending)        Labs Reviewed   CBC WITH AUTOMATED DIFF - Abnormal; Notable for the following:        Result Value    RBC 4.29 (*)     MCH 36.1 (*)     MCHC 37.9 (*)     All other components within normal limits   METABOLIC PANEL, COMPREHENSIVE - Abnormal; Notable for the following:     Glucose 119 (*)     BUN/Creatinine ratio 8 (*)     All other components within normal limits   PTT - Abnormal; Notable for the following:     aPTT 46.0 (*)     All other components within normal limits   GLUCOSE, POC - Abnormal; Notable for the following:     Glucose (POC) 158 (*)     All other components within normal limits   MAGNESIUM   CARDIAC PANEL,(CK, CKMB & TROPONIN)   PROTHROMBIN TIME + INR   URINALYSIS W/ RFLX MICROSCOPIC   POC GLUCOSE       Recent Results (from the past 12 hour(s))   GLUCOSE, POC    Collection Time: 04/24/17  8:24 PM   Result Value Ref Range    Glucose (POC) 158 (H) 70 - 110 mg/dL   CBC WITH AUTOMATED DIFF    Collection Time: 04/24/17  8:30 PM   Result Value Ref Range    WBC 6.8 4.6 - 13.2 K/uL    RBC 4.29 (L) 4.70 - 5.50 M/uL    HGB 15.5 13.0 - 16.0 g/dL    HCT 40.9 36.0 - 48.0 %    MCV 95.3 74.0 - 97.0 FL    MCH 36.1 (H) 24.0 - 34.0 PG    MCHC 37.9 (H) 31.0 - 37.0 g/dL    RDW 13.0 11.6 - 14.5 %    PLATELET 485 927 - 363 K/uL    MPV 10.0 9.2 - 11.8 FL    NEUTROPHILS 52 40 - 73 %    LYMPHOCYTES 39 21 - 52 %    MONOCYTES 8 3 - 10 %    EOSINOPHILS 1 0 - 5 %    BASOPHILS 0 0 - 2 %    ABS. NEUTROPHILS 3.6 1.8 - 8.0 K/UL    ABS. LYMPHOCYTES 2.7 0.9 - 3.6 K/UL    ABS. MONOCYTES 0.5 0.05 - 1.2 K/UL    ABS. EOSINOPHILS 0.1 0.0 - 0.4 K/UL    ABS.  BASOPHILS 0.0 0.0 - 0.06 K/UL    DF AUTOMATED METABOLIC PANEL, COMPREHENSIVE    Collection Time: 04/24/17  8:30 PM   Result Value Ref Range    Sodium 140 136 - 145 mmol/L    Potassium 3.6 3.5 - 5.5 mmol/L    Chloride 104 100 - 108 mmol/L    CO2 26 21 - 32 mmol/L    Anion gap 10 3.0 - 18 mmol/L    Glucose 119 (H) 74 - 99 mg/dL    BUN 10 7.0 - 18 MG/DL    Creatinine 1.25 0.6 - 1.3 MG/DL    BUN/Creatinine ratio 8 (L) 12 - 20      GFR est AA >60 >60 ml/min/1.73m2    GFR est non-AA >60 >60 ml/min/1.73m2    Calcium 8.7 8.5 - 10.1 MG/DL    Bilirubin, total 0.5 0.2 - 1.0 MG/DL    ALT (SGPT) 36 16 - 61 U/L    AST (SGOT) 24 15 - 37 U/L    Alk.  phosphatase 65 45 - 117 U/L    Protein, total 8.0 6.4 - 8.2 g/dL    Albumin 4.0 3.4 - 5.0 g/dL    Globulin 4.0 2.0 - 4.0 g/dL    A-G Ratio 1.0 0.8 - 1.7     MAGNESIUM    Collection Time: 04/24/17  8:30 PM   Result Value Ref Range    Magnesium 1.9 1.6 - 2.6 mg/dL   CARDIAC PANEL,(CK, CKMB & TROPONIN)    Collection Time: 04/24/17  8:30 PM   Result Value Ref Range     39 - 308 U/L    CK - MB 2.9 <3.6 ng/ml    CK-MB Index 1.5 0.0 - 4.0 %    Troponin-I, Qt. <0.02 0.00 - 0.06 NG/ML   PROTHROMBIN TIME + INR    Collection Time: 04/24/17  8:30 PM   Result Value Ref Range    Prothrombin time 12.9 11.5 - 15.2 sec    INR 1.0 0.8 - 1.2     PTT    Collection Time: 04/24/17  8:30 PM   Result Value Ref Range    aPTT 46.0 (H) 23.0 - 36.4 SEC   EKG, 12 LEAD, INITIAL    Collection Time: 04/24/17  9:43 PM   Result Value Ref Range    Ventricular Rate 73 BPM    Atrial Rate 73 BPM    P-R Interval 160 ms    QRS Duration 110 ms    Q-T Interval 418 ms    QTC Calculation (Bezet) 460 ms    Calculated P Axis 19 degrees    Calculated R Axis -46 degrees    Calculated T Axis 62 degrees    Diagnosis       Normal sinus rhythm  Left anterior fascicular block  Nonspecific T wave abnormality  Prolonged QT  Abnormal ECG  When compared with ECG of 31-AUG-2014 15:30,  T wave inversion more evident in Anterior leads             MDM  Number of Diagnoses or Management Options  Cerebrovascular accident (CVA) due to thrombosis of middle cerebral artery, unspecified blood vessel laterality:   Facial weakness:   Left arm weakness:   Diagnosis management comments: DDX- ischemic stroke, cns bleed, exacerbation of CVA vs new, hypertensive urgency vs emergency. R/u ACS, MI, anemia, metabolic derangment, doubt infection, r/o mass. Amount and/or Complexity of Data Reviewed  Clinical lab tests: reviewed and ordered  Tests in the radiology section of CPT®: ordered and reviewed (CT head wo cont)  Tests in the medicine section of CPT®: reviewed and ordered (EKG)  Discuss the patient with other providers: yes (Caryle Shields, MD (tele neurologist), Eleni Bush MD (hospitalist), Rene Swartz MD (neurologist))  Independent visualization of images, tracings, or specimens: yes (EKG, CXR)    Critical Care  Total time providing critical care: 30-74 minutes    ED Course     Medications   sodium chloride (NS) flush 5 mL (not administered)   sodium chloride (NS) flush 5 mL (not administered)   labetalol (NORMODYNE;TRANDATE) injection 10 mg (not administered)   0.9% sodium chloride infusion 50 mL (not administered)   alteplase (ACTIVASE) bolus dose (9 mg IntraVENous Given 4/24/17 2116)   alteplase (ACTIVASE) infusion 80.96 mg (81 mg IntraVENous Given 4/24/17 2128)       Procedures   8:21 PM  Code S called    PROGRESS NOTE:  8:22 PM  Initial assessment performed. Written by Rafael Mcgee ED Scribe, as dictated by Karan Contreras MD.    CONSULT NOTE:   8:27 PM   Karan Contreras MD spoke with Caryle Shields, MD    Specialty: tele neurologist  Discussed pt's hx, disposition, and available diagnostic and imaging results over the telephone. Reviewed care plans. Consulting physician agrees to see patient.    Written by Rafael Mcgee ED Scribe, as dictated by Karan Contreras MD.    CONSULT NOTE:   9:01 PM   Karan Contreras MD spoke with Caryle Shields, MD    Specialty: tele neurologist  Discussed pt's hx, disposition, and available diagnostic and imaging results over the telephone. Pt also c/o worsening left leg numbness at this time. Reviewed care plans. Consulting physician states this could be exacerbation of prior CVA. However, cannot exclude acute/new CVA. Therefore tPA use was discussed. Pt and family opted for tPA knowing the risks and benefits. Pt does meet inclusion criteria. There is no criteria that excludes him per pts clinical picture and per teleneurologist.   Written by PIYUSH Balderas, as dictated by Edi Lopez MD    CONSULT NOTE:   9:20 PM   Edi Lopez MD spoke with Micael Merlin, MD   Specialty: hospitalist  Discussed pt's hx, disposition, and available diagnostic and imaging results. Reviewed care plans. Consulting physician agrees with plans as outlined. Agrees to admit pt to ICU. Written by PIYUSH Balderas, as dictated by Edi Lopez MD    ADMISSION NOTE:  9:21 PM   Patient is being admitted to the hospital by Micael Merlin, MD . The results of their tests and reasons for their admission have been discussed with them and/or available family. They convey agreement and understanding for the need to be admitted and for their admission diagnosis. Written by PIYUSH Balderas, as dictated by Edi Lopez MD.    CONSULT NOTE:   9:24 PM   Edi Lopez MD spoke with Clari Schreiber MD    Specialty: neurologist  Discussed pt's hx, disposition, and available diagnostic and imaging results over the telephone. Reviewed care plans. Consulting physician agrees with plan of tele-neurologist with plan. Written by PIYUSH Balderas, as dictated by Edi Lopez MD      PROGRESS NOTE:   12:52 AM  Feeling better. Left facial droop with significant improvement. TPA  given. Has minimal HA which is not knew. He is already feeling better with decreased left facial paresis, and improved strength left leg. Awaiting bed at ICU.   Written by PIYUSH Gomez, as dictated by Emily Ceja MD .    CLINICAL IMPRESSION    1. Cerebrovascular accident (CVA) due to thrombosis of middle cerebral artery, unspecified blood vessel laterality    2. Facial weakness    3. Left arm weakness        ATTESTATIONS:  This note is prepared by Sade Phelps and Penne Skiff, acting as Scribe for Emily Ceja MD.    Emily Ceja MD: The scribe's documentation has been prepared under my direction and personally reviewed by me in its entirety. I confirm that the note above accurately reflects all work, treatment, procedures, and medical decision making performed by me. CRITICAL CARE NOTE:    9:01 PM    I have spent 70 minutes of critical care time involved in lab review, consultations with specialist, family decision-making, and documentation. During this entire length of time I was immediately available to the patient. Critical Care: The reason for providing this level of medical care for this critically ill patient was due a critical illness that impaired one or more vital organ systems such that there was a high probability of imminent or life threatening deterioration in the patients condition. This care involved high complexity decision making to assess, manipulate, and support vital system functions, to treat this degreee vital organ system failure and to prevent further life threatening deterioration of the patients condition.

## 2017-04-25 NOTE — PROGRESS NOTES
Problem: Dysphagia (Adult)  Goal: *Acute Goals and Plan of Care (Insert Text)  Outcome: Resolved/Met Date Met:  04/25/17  SPEECH LANGUAGE PATHOLOGY BEDSIDE SWALLOW EVALUATION ONLY     Patient: Vishal Villegas (78 y.o. male)  Date: 4/25/2017  Primary Diagnosis: Acute CVA (cerebrovascular accident) ([facility] [address]  Facial droop  Left arm weakness  Acute CVA (cerebrovascular accident) ([facility] [address]        Precautions:         ASSESSMENT :  Based on the objective data described below, the patient presents with mild oropharyngeal dysphagia. Clinical bedside swallow evaluation completed. Pt AOx4 and followed all commands. Pt reports acute CVA resolved this hospitalization with past history of speech therapy. Meal assessment completed without event. Noted prolonged mastication, effortful swallows, and audible swallows during PO intake. Pt reports baseline swallow abilities; no concerns with swallow at this time. Educated the pt and family re: CVA, dysphagia, diet textures/consistencies, and aspiration risk. SLP signing off as no other acute SLP needs at this time. PLAN : Regular with thin liquids   Recommendations and Planned Interventions:  ST signing off. No acute SLP needs at this time. OP SLP services recommended should pt ID deficits with speech and cognition. Discharge Recommendations: Outpatient       SUBJECTIVE:   Patient stated I knew the symptoms and came here right away.       OBJECTIVE:       Past Medical History:   Diagnosis Date    CAD (coronary artery disease)      GERD (gastroesophageal reflux disease)      Headache      Hypertension      Stroke ([facility] [address] 2011     Past Surgical History:   Procedure Laterality Date    CARDIAC SURG PROCEDURE UNLIST        HX CORONARY STENT PLACEMENT         Prior Level of Function/Home Situation: Per pt/chart   Home Situation  Home Environment: Private residence  One/Two Story Residence: One story  Living Alone: No  Support Systems: Family member(s)  Patient Expects to be Discharged to[de-identified] Private residence  Current DME Used/Available at Home: [employer]  Diet prior to admission: regular   Current Diet:  Regular    Cognitive and Communication Status:  Neurologic State: Alert  Orientation Level: Oriented X4  Cognition: Appropriate decision making, Appropriate for age attention/concentration, Appropriate safety awareness, Follows commands  Perception: Appears intact     Safety/Judgement: Insight into deficits  Oral Assessment:  Oral Assessment  Labial: No impairment  Dentition: Limited; Intact; Natural  Oral Hygiene: good (dry mouth)  Lingual: No impairment  Velum: No impairment  Mandible: No impairment  P.O. Trials:  Patient Position: 61 HOB  Vocal quality prior to P.O.: No impairment  Consistency Presented: Solid; Thin liquid  How Presented: Self-fed/presented; Successive swallows;Straw  How Much:  (meal)  Bolus Acceptance: No impairment  Bolus Formation/Control: Impaired  Type of Impairment: Mastication;Delayed  Propulsion: Delayed (# of seconds)  Oral Residue: None  Initiation of Swallow: Delayed (# of seconds)  Laryngeal Elevation: Functional  Aspiration Signs/Symptoms: None  Pharyngeal Phase Characteristics: Audible swallow;Effortful swallow  Effective Modifications: None  Cues for Modifications: None        Oral Phase Severity: Mild  Pharyngeal Phase Severity : Mild     GCODESwallowing:  Swallow Current Status CI= 1-19%   Swallow Goal Status CI= 1-19%   Swallow D/C Status CI= 1-19%     The severity rating is based on the following outcomes:  ADELA Noms Swallow Level 6              Clinical Judgement     PAIN:  Start of Eval: 0  End of Eval: 0     After treatment:   [ ]            Patient left in no apparent distress sitting up in chair  [X]            Patient left in no apparent distress in bed  [ ]            Call bell left within reach  [X]            Nursing notified  [X]            Family present  [ ]            Caregiver present  [ ]            Bed alarm activated      COMMUNICATION/EDUCATION:   [X]            Aspiration precautions; swallow safety; compensatory techniques. [X]            Patient/family have participated as able in goal setting and plan of care. [X]            Patient/family agree to work toward stated goals and plan of care. [ ]            Patient understands intent and goals of therapy; neutral about participation. [ ]            Patient unable to participate in goal setting/plan of care; educ ongoing with interdisciplinary staff  [ ]             Posted safety precautions in patient's room.      Thank you for this referral.  SHERI Donahue  Time Calculation: 11 mins

## 2017-04-25 NOTE — PROGRESS NOTES
5- Dr. Demetrius Smith updated regarding EKG reading lateral infarct, age undetermined. MD to look at EKG in EMR and states to draw next set of cardiac enzymes now.

## 2017-04-25 NOTE — DIABETES MGMT
GLYCEMIC CONTROL & NUTRITION:    - Discussed in rounds, no documented h/o DM  - BG elevated upon admission, A1C wnl  - recommend d/c POCT and watch AM labs, if trends 150+ restart POCT + Humalog corrective coverage    Lab Results   Component Value Date/Time    Hemoglobin A1c 5.3 04/25/2017 12:15 AM       Recent Glucose Results:   Lab Results   Component Value Date/Time     (H) 04/25/2017 05:00 AM     (H) 04/24/2017 08:30 PM    GLUCPOC 132 (H) 04/25/2017 12:21 AM    GLUCPOC 158 (H) 04/24/2017 08:24 PM         MATT Arshad, MPH, RD

## 2017-04-25 NOTE — PROGRESS NOTES
1107--Anti-coag's drawn and sent. 1209--Transported patient to [facility]. 1253--Returned to ICU Bed 9. 1645--Preformed EKG. Cardiac enzymes drawn and sent. 1915--Bedside and Verbal shift change report given to Sage Knox RN (oncoming nurse) by Malik Baron RN (offgoing nurse).  Report included the following information SBAR, Kardex, Intake/Output, MAR, Recent Results, Med Rec Status and Cardiac Rhythm SR.

## 2017-04-26 VITALS
WEIGHT: 263.89 LBS | RESPIRATION RATE: 17 BRPM | DIASTOLIC BLOOD PRESSURE: 60 MMHG | HEIGHT: 70 IN | BODY MASS INDEX: 37.78 KG/M2 | OXYGEN SATURATION: 97 % | TEMPERATURE: 97.8 F | SYSTOLIC BLOOD PRESSURE: 95 MMHG | HEART RATE: 60 BPM

## 2017-04-26 LAB
ANION GAP BLD CALC-SCNC: 11 MMOL/L (ref 3–18)
ATRIAL RATE: 66 BPM
BUN SERPL-MCNC: 8 MG/DL (ref 7–18)
BUN/CREAT SERPL: 7 (ref 12–20)
CALCIUM SERPL-MCNC: 8.8 MG/DL (ref 8.5–10.1)
CALCULATED P AXIS, ECG09: 16 DEGREES
CALCULATED R AXIS, ECG10: -40 DEGREES
CALCULATED T AXIS, ECG11: 43 DEGREES
CHLORIDE SERPL-SCNC: 107 MMOL/L (ref 100–108)
CK MB CFR SERPL CALC: 1.3 % (ref 0–4)
CK MB SERPL-MCNC: 2.4 NG/ML (ref 5–25)
CK SERPL-CCNC: 186 U/L (ref 39–308)
CO2 SERPL-SCNC: 24 MMOL/L (ref 21–32)
CREAT SERPL-MCNC: 1.16 MG/DL (ref 0.6–1.3)
DIAGNOSIS, 93000: NORMAL
ERYTHROCYTE [DISTWIDTH] IN BLOOD BY AUTOMATED COUNT: 13.1 % (ref 11.6–14.5)
GLUCOSE SERPL-MCNC: 104 MG/DL (ref 74–99)
HCT VFR BLD AUTO: 38 % (ref 36–48)
HGB BLD-MCNC: 13.7 G/DL (ref 13–16)
MAGNESIUM SERPL-MCNC: 1.8 MG/DL (ref 1.6–2.6)
MCH RBC QN AUTO: 34.9 PG (ref 24–34)
MCHC RBC AUTO-ENTMCNC: 36.1 G/DL (ref 31–37)
MCV RBC AUTO: 96.7 FL (ref 74–97)
P-R INTERVAL, ECG05: 162 MS
PLATELET # BLD AUTO: 194 K/UL (ref 135–420)
PMV BLD AUTO: 10.3 FL (ref 9.2–11.8)
POTASSIUM SERPL-SCNC: 3.5 MMOL/L (ref 3.5–5.5)
Q-T INTERVAL, ECG07: 550 MS
QRS DURATION, ECG06: 110 MS
QTC CALCULATION (BEZET), ECG08: 576 MS
RBC # BLD AUTO: 3.93 M/UL (ref 4.7–5.5)
SODIUM SERPL-SCNC: 142 MMOL/L (ref 136–145)
TROPONIN I SERPL-MCNC: <0.02 NG/ML (ref 0–0.06)
VENTRICULAR RATE, ECG03: 66 BPM
WBC # BLD AUTO: 7.4 K/UL (ref 4.6–13.2)

## 2017-04-26 PROCEDURE — 97166 OT EVAL MOD COMPLEX 45 MIN: CPT

## 2017-04-26 PROCEDURE — 80048 BASIC METABOLIC PNL TOTAL CA: CPT | Performed by: INTERNAL MEDICINE

## 2017-04-26 PROCEDURE — 85027 COMPLETE CBC AUTOMATED: CPT | Performed by: INTERNAL MEDICINE

## 2017-04-26 PROCEDURE — 74011250637 HC RX REV CODE- 250/637: Performed by: PSYCHIATRY & NEUROLOGY

## 2017-04-26 PROCEDURE — 74011250636 HC RX REV CODE- 250/636: Performed by: INTERNAL MEDICINE

## 2017-04-26 PROCEDURE — 74011250637 HC RX REV CODE- 250/637: Performed by: HOSPITALIST

## 2017-04-26 PROCEDURE — 74011250637 HC RX REV CODE- 250/637: Performed by: INTERNAL MEDICINE

## 2017-04-26 PROCEDURE — 97161 PT EVAL LOW COMPLEX 20 MIN: CPT

## 2017-04-26 PROCEDURE — 82550 ASSAY OF CK (CPK): CPT | Performed by: INTERNAL MEDICINE

## 2017-04-26 PROCEDURE — 83735 ASSAY OF MAGNESIUM: CPT | Performed by: INTERNAL MEDICINE

## 2017-04-26 PROCEDURE — 36415 COLL VENOUS BLD VENIPUNCTURE: CPT | Performed by: INTERNAL MEDICINE

## 2017-04-26 RX ORDER — ASPIRIN 81 MG/1
81 TABLET ORAL DAILY
Qty: 1 TAB | Refills: 0 | Status: SHIPPED
Start: 2017-04-27 | End: 2017-05-09 | Stop reason: SDUPTHER

## 2017-04-26 RX ORDER — CLOPIDOGREL BISULFATE 75 MG/1
75 TABLET ORAL DAILY
Status: DISCONTINUED | OUTPATIENT
Start: 2017-04-26 | End: 2017-04-26 | Stop reason: HOSPADM

## 2017-04-26 RX ORDER — ASPIRIN 325 MG
325 TABLET, DELAYED RELEASE (ENTERIC COATED) ORAL ONCE
Status: COMPLETED | OUTPATIENT
Start: 2017-04-26 | End: 2017-04-26

## 2017-04-26 RX ORDER — ASPIRIN 81 MG/1
81 TABLET ORAL DAILY
Status: DISCONTINUED | OUTPATIENT
Start: 2017-04-27 | End: 2017-04-26 | Stop reason: HOSPADM

## 2017-04-26 RX ADMIN — PANTOPRAZOLE SODIUM 40 MG: 40 TABLET, DELAYED RELEASE ORAL at 08:39

## 2017-04-26 RX ADMIN — RANOLAZINE 1000 MG: 500 TABLET, FILM COATED, EXTENDED RELEASE ORAL at 08:42

## 2017-04-26 RX ADMIN — SODIUM CHLORIDE 75 ML/HR: 900 INJECTION, SOLUTION INTRAVENOUS at 03:23

## 2017-04-26 RX ADMIN — Medication 1 CAPSULE: at 08:39

## 2017-04-26 RX ADMIN — LORATADINE 10 MG: 10 TABLET ORAL at 08:39

## 2017-04-26 RX ADMIN — TOPIRAMATE 50 MG: 25 TABLET, FILM COATED ORAL at 08:41

## 2017-04-26 RX ADMIN — CLOPIDOGREL BISULFATE 75 MG: 75 TABLET, FILM COATED ORAL at 10:16

## 2017-04-26 RX ADMIN — ASPIRIN 325 MG: 325 TABLET, DELAYED RELEASE ORAL at 10:16

## 2017-04-26 RX ADMIN — ISOSORBIDE MONONITRATE 60 MG: 30 TABLET, EXTENDED RELEASE ORAL at 08:41

## 2017-04-26 NOTE — PROGRESS NOTES
Problem: Self Care Deficits Care Plan (Adult)  Goal: *Acute Goals and Plan of Care (Insert Text)  OCCUPATIONAL THERAPY EVALUATION/DISCHARGE     Patient: James Jean-Baptiste (42 y.o. male)  Date: 4/26/2017  Primary Diagnosis: Acute CVA (cerebrovascular accident) ([facility] [address]  Facial droop  Left arm weakness  Acute CVA (cerebrovascular accident) ([facility] [address]        Precautions:   WBAT, Fall, Other (comment) (Shoulder, Stroke)      ASSESSMENT AND RECOMMENDATIONS:  Based on the objective data described below, the patient presents with ability to perform ADL tasks at/near baseline level. Pt has had previous stroke affecting L side w/ Acute Rehab and has made modifications to home and ADLs to be at mod I level with intermittent assist from spouse. Pt has LUE GivMohr sling to assist w/ balance and LUE protection. LUE noted to have increased hypertonicity of L hand with pt/family reporting pain d/t arthritis and prolonged positioning. Pt reports increased tone in morning. Pt would benefit from a soft covered Resting Hand Spling with picture provided to family to obtain as outpatient. Pt with no further OT/ADL concerns at this time. Skilled occupational therapy is not indicated at this time. Education: Reviewed home safety, body mechanics, signs and symptoms of a stroke, risk factors, blood sugar management in relation to stroke, and adaptive dressing techniques with patient & spouse verbalizing & demonstrating understanding at this time. Discharge Recommendations: None  Further Equipment Recommendations for Discharge: N/A        SUBJECTIVE:   Patient stated I'm back to myself.       OBJECTIVE DATA SUMMARY:       Past Medical History:   Diagnosis Date    CAD (coronary artery disease)      GERD (gastroesophageal reflux disease)      Headache      Hypertension      Stroke ([facility] [address] 2011     Past Surgical History:   Procedure Laterality Date    CARDIAC SURG PROCEDURE UNLIST        HX CORONARY STENT PLACEMENT         Barriers to Learning/Limitations: yes;  cognitive  Compensate with: visual, verbal, tactile, kinesthetic cues/model     G-Codes (GP)  Self Care   Current  CI= 1-19%   Goal  CI= 1-19%   D/C  CI= 1-19%  The severity rating is based on the professional judgement & direct observation of Level of Assistance required for Functional Mobility and ADLs. Eval Complexity: History: MEDIUM Complexity : Expanded review of history including physical, cognitive and psychosocial  history ; Examination: MEDIUM Complexity : 3-5 performance deficits relating to physical, cognitive , or psychosocial skils that result in activity limitations and / or participation restrictions; Decision Making:LOW Complexity : No comorbidities that affect functional and no verbal or physical assistance needed to complete eval tasks      Prior Level of Function/Home Situation: Pt with supportive spouse who assists PRN, but encourages pt to be independent. Pt walks [employer] for exercise w/ seated rest breaks  Home Situation  Home Environment: Private residence  One/Two Story Residence: One story  Living Alone: No  Support Systems: Spouse/Significant Other/Partner  Patient Expects to be Discharged to[de-identified] Private residence  Current DME Used/Available at Home: Cane, quad, Cane, straight, Transfer bench  Tub or Shower Type: Tub/Shower combination  [X]     Right hand dominant       [ ]     Left hand dominant  Cognitive/Behavioral Status:  Neurologic State: Alert  Orientation Level: Oriented X4  Cognition: Follows commands;Memory loss; Appropriate safety awareness; Appropriate for age attention/concentration  Safety/Judgement: Awareness of environment; Insight into deficits; Fall prevention  Coordination:  Fine Motor Skills-Upper: Left Impaired;Right Intact    Gross Motor Skills-Upper: Left Impaired;Right Intact  Balance:  Sitting: Intact  Standing: Intact; With support  Strength:  RUE 5/5; LUE impaired at baseline (shoulder active ROM, but 0/5 distally)  Tone & Sensation:  L hand hypertonicity, but responds to NeuroMuscular Inhibition techniques and able to achieve functional hand position & maintain position 4-5 minutes. Range of Motion:  RUE WFL; LUE impaired at baseline (PROM grossly WFL)  Functional Mobility and Transfers for ADLs:  Bed Mobility:    Pt received sitting up in recliner  Transfers:  Sit to Stand: Modified independent  Bed to Chair: Modified independent  ADL Assessment:  Feeding: Setup     Oral Facial Hygiene/Grooming: Setup     Bathing: Modified independent     Upper Body Dressing: Modified independent     Lower Body Dressing: Modified independent     Toileting: Modified independent     ADL Intervention:  Feeding  Feeding Assistance: Supervision/set-up     Cognitive Retraining  Safety/Judgement: Awareness of environment; Insight into deficits; Fall prevention     Therapeutic Exercise:  LUE PROM with NeuroMuscular Inhibition      Pain:  Pre-treatment: 0/10  Post-treatment: 0/10  Activity Tolerance:   Pt able to stand 2-3 minute(s). Pt able to complete ADLs with intermittent rest breaks. Please refer to the flowsheet for vital signs taken during this treatment. After treatment:   [X]  Patient left in no apparent distress sitting up in chair  [ ]  Patient left in no apparent distress in bed  [X]  Call bell left within reach  [X]  Nursing notified  [X]  Caregiver present  [ ]  Bed alarm activated      COMMUNICATION/EDUCATION:   Communication/Collaboration:  [X]      Home safety education was provided and the patient/caregiver indicated understanding. [X]      Patient/family have participated as able and agree with findings and recommendations. [ ]      Patient is unable to participate in plan of care at this time.      Thank you for this referral.  Cassi Kemp, OTR/L  Time Calculation: 31 mins

## 2017-04-26 NOTE — PROGRESS NOTES
0730 Assumed care of patient. Pt resting quietly in bed with wife at bedside. 0800 Assessment completed and charted. See flow sheet. No neuro changes. 1200 Reassessment completed and charted. Pt sitting up in chair. No neuro changes. 1330 Call received from Dr. Tee Samayoa. Plan is to d/c patient home. Pt and wife notified. 1415 I have reviewed discharge instructions with the patient and spouse. The patient and spouse verbalized understanding.

## 2017-04-26 NOTE — PROGRESS NOTES
conducted an initial consultation and Spiritual Assessment for Mis Miller, who is a 48 y.o.,male. According to the patients chart Anabaptist Affiliation is: No Jehovah's witness. Patient's wife/girlfriend was at the bedside. He explained his medical history. The reason the Patient came to the hospital is:   Patient Active Problem List    Diagnosis Date Noted    Facial droop 04/24/2017    Left arm weakness 04/24/2017    Acute CVA (cerebrovascular accident) ([facility] [address] 04/24/2017    Coronary artery disease involving native coronary artery of native heart without angina pectoris 03/08/2017    Hemiparesis affecting left side as late effect of cerebrovascular accident (CVA) ([facility] [address] 03/08/2017    Mixed hyperlipidemia 02/07/2017    Episodic cluster headache, not intractable 11/08/2016    Headache 09/12/2016    [facility] to [facility]    Depression 03/04/2015    Meralgia paresthetica of left side 09/11/2013    CAD (coronary artery disease) 07/13/2012    S/P PTCA (percutaneous transluminal coronary angioplasty) 07/13/2012    Abnormal EKG 06/14/2012    Family history of early CAD 06/14/2012    Dizziness 06/14/2012        The  provided the following Interventions:  Initiated a relationship of care and support. Listened empathically. Provided information about Spiritual Care Services. Offered prayer and assurance of continued prayers on patients behalf. Chart reviewed. The following outcomes were achieved:  Patient shared limited information about his medical narrative.  confirmed Patient's Anabaptist Affiliation. Patient processed feelings about current hospitalization. Patient expressed gratitude for Spiritual Care visit. Assessment:  There are no significant spiritual or Mormon issues which require further intervention at this time.    Patient does not have any Mormon or cultural needs that will affect patients preferences in health care. Plan:  Chaplains will continue to follow and will provide pastoral care as needed or requested.  recommends bedside caregivers page  on duty if patient shows signs of acute spiritual or emotional distress. [address] [facility] [address], M.Div.   Board Certified   [phone] - Office

## 2017-04-26 NOTE — PROGRESS NOTES
Problem: Mobility Impaired (Adult and Pediatric)  Goal: *Acute Goals and Plan of Care (Insert Text)  Patient is functioning at his baseline level of function. No further acute PT services required. Outcome: Resolved/Met Date Met:  04/26/17  PHYSICAL THERAPY EVALUATION & DISCHARGE     Patient: Niki Cox (31 y.o. male)  Date: 4/26/2017  Primary Diagnosis: Acute CVA (cerebrovascular accident) ([facility])  Facial droop  Left arm weakness  Acute CVA (cerebrovascular accident) ([facility])        Precautions:   WBAT, Fall, Other (comment) (Shoulder, Stroke)      ASSESSMENT AND RECOMMENDATIONS:  Based on the objective data described below, the patient presents to have returned to baseline level of function. Patient has left upper extremity weakness from previous stroke. Patient uses a shoulder sling and a single point cane in right upper extremity to ambulate. Patient sat up with supervision and then stood up to a single point cane. Patient proceeded to ambulate over 200 feet with modified independence. Patient's gait is steady with no loss of balance. Patient tolerated ambulation very well and was returned back to his room, seated at edge of bed. Per patient and patient's spouse, the patient is functioning at his baseline. Patient appears to be safe for home discharge from mobility perspective. Recommend home health services at discharge. Skilled physical therapy is not indicated at this time. Discharge Recommendations: Home Health  Further Equipment Recommendations for Discharge: N/A        SUBJECTIVE:   Patient stated I'm ready to go.       OBJECTIVE DATA SUMMARY:       Past Medical History:   Diagnosis Date    CAD (coronary artery disease)      GERD (gastroesophageal reflux disease)      Headache      Hypertension      Stroke ([facility]) 2011     Past Surgical History:   Procedure Laterality Date    CARDIAC SURG PROCEDURE UNLIST        HX CORONARY STENT PLACEMENT         Barriers to Learning/Limitations: None  Compensate with: visual, verbal, tactile, kinesthetic cues/model  Prior Level of Function/Home Situation: Patient has left sided upper extremity weakness from previous stroke. Uses a cane and shoulder sling when ambulating. Home Situation  Home Environment: Private residence  One/Two Story Residence: One story  Living Alone: No  Support Systems: Spouse/Significant Other/Partner  Patient Expects to be Discharged to[de-identified] Private residence  Current DME Used/Available at Home: Jud Rater, quad, Jud Rater, straight, Transfer bench  Tub or Shower Type: Tub/Shower combination  Critical Behavior:  Neurologic State: Alert  Orientation Level: Oriented X4  Cognition: Follows commands;Memory loss; Appropriate safety awareness; Appropriate for age attention/concentration  Safety/Judgement: Awareness of environment; Insight into deficits; Fall prevention  Psychosocial  Patient Behaviors: Cooperative;Calm;Flat affect  Family  Behaviors: Appropriate for situation;Calm; Cooperative;Supportive  Purposeful Interaction: Yes  Pt Identified Daily Priority: Clinical issues (comment)  Caritas Process: Nurture loving kindness;Establish trust;Teaching/learning; Attend basic human needs  Caring Interventions: Reassure  Reassure: Therapeutic listening; Informing; Acceptance; Instilling nguyen and hope;Support family  Therapeutic Modalities: Humor; Intentional therapeutic touch  Skin Condition/Temp: Warm;Dry  Family  Behaviors: Appropriate for situation;Calm; Cooperative;Supportive  Skin Integrity: Intact  Skin Integumentary  Skin Color: Appropriate for ethnicity  Skin Condition/Temp: Warm;Dry  Skin Integrity: Intact  Turgor: Non-tenting  Hair Growth: Present  Varicosities: Absent  Strength:    Strength:  Within functional limits (Decreased LUE strength)  Tone & Sensation:   Tone: Normal  Sensation: Intact     Range Of Motion:  AROM: Within functional limits (Left upper extremity impaired strength)  PROM: Within functional limits  Functional Mobility:  Bed Mobility:  Rolling: Supervision  Supine to Sit: Supervision  Sit to Supine: Supervision  Scooting: Supervision  Transfers:  Sit to Stand: Modified independent  Stand to Sit: Modified independent  Bed to Chair: Modified independent  Balance:   Sitting: Intact  Standing: Intact; With support  Ambulation/Gait Training:  Distance (ft): 200 Feet (ft)  Assistive Device: Gait belt;Cane, straight  Ambulation - Level of Assistance: Modified independent  Gait Description (WDL): Exceptions to WDL  Gait Abnormalities: Decreased step clearance  Base of Support: Shift to right  Speed/So: Slow  Step Length: Right shortened;Left shortened     Pain:  Pain Scale 1: Numeric (0 - 10)  Pain Intensity 1: 0  Activity Tolerance:   Good  Please refer to the flowsheet for vital signs taken during this treatment. After treatment:   [ ]         Patient left in no apparent distress sitting up in chair  [X]         Patient left in no apparent distress in bed  [X]         Call bell left within reach  [X]         Nursing notified  [ ]         Caregiver present  [ ]         Bed alarm activated      COMMUNICATION/EDUCATION:   [X]         Fall prevention education was provided and the patient/caregiver indicated understanding. [X]         Patient/family have participated as able in goal setting and plan of care. [X]         Patient/family agree to work toward stated goals and plan of care. [ ]         Patient understands intent and goals of therapy, but is neutral about his/her participation. [ ]         Patient is unable to participate in goal setting and plan of care.      Thank you for this referral.  Smiley Robertson   Time Calculation: 18 mins     Eval Complexity: History: MEDIUM  Complexity : 1-2 comorbidities / personal factors will impact the outcome/ POC Exam:MEDIUM Complexity : 3 Standardized tests and measures addressing body structure, function, activity limitation and / or participation in recreation  Presentation: LOW Complexity : Stable, uncomplicated  Clinical Decision Making:Low Complexity   Overall Complexity:LOW

## 2017-04-26 NOTE — PROGRESS NOTES
NEUROLOGY PROGRESS NOTE        Patient: Rochelle Chase        Sex: male          DOA: 4/24/2017  YOB: 1970      Age:  48 y.o.         LOS: 2 days     Identification:  [id number] gentleman with history of stroke in 2011, resulting in left arm and leg weakness, hyperlipidemia, migraines and CAD, who presented to hospital with increasing weakness on the left arm and numbness on the left face, left arm and left leg, received tPA. SUBJECTIVE:   Pt is stable. No new weakness or numbness. No headaches. Stroke Work-up:  Brain MRI:  1. No acute infarct, hemorrhage, mass effect, or herniation. 2. Localized right posterior superior basal ganglia/periventricular signal abnormality, suspect small chronic infarct with associated axonal degeneration. 3. Few scattered small T2/FLAIR hyperintense white matter foci. This finding is quite nonspecific and is not particularly suggestive of a demyelinating process. Similar findings may be seen in patient's of all ages. Similar findings can be seen with migraine headaches or very mild chronic small vessel changes. Head CT: Focal hypodensity in the right frontal periventricular white matter, possibly a small focus of chronic microvascular ischemic change or a small age-indeterminate lacunar infarct. No CT evidence of large territorial infarct or hemorrhage. [facility] MRA neck: 1. Mild proximal ICA irregularity without hemodynamically significant carotid stenosis based on NASCET criteria. 2.  Moderate proximal left vertebral artery, V1 segment stenosis. No high-grade vertebral artery stenosis. MRA head: MRA head within normal limits. No focal high grade stenosis or aneurysm.   Echocardiogram: report pending  Lipid panel:         Lab Results   Component Value Date/Time     Cholesterol, total 154 04/25/2017 12:15 AM     HDL Cholesterol 30 04/25/2017 12:15 AM     LDL,Direct 70 07/26/2016 09:43 AM     LDL, calculated 57.2 04/25/2017 12:15 AM     VLDL, calculated 66.8 04/25/2017 12:15 AM     Triglyceride 334 04/25/2017 12:15 AM     CHOL/HDL Ratio 5.1 04/25/2017 12:15 AM      HbA1c:         Lab Results   Component Value Date/Time     Hemoglobin A1c 5.3 04/25/2017 12:15 AM      ASSESSMENT/IMPRESSION:   The clinical presentation is suggestive of transient ischemic attack or a small stroke, which seems to have been abated with Alteplase. Echo is pending. The patient needs to be on dual antiplatelet regimen.     RECOMMENDATIONS:  1. Aspirin 81mg daily (loading dose today)  2. Clopidogrel 75mg daily. 3. Continue Repatha  4. Echocardiogram pending. If Echo is wnl, he may be discharged home. I will follow him as outpatient. Please do not hesitate to return with any questions. Signed:  Bindu Lopez MD  4/26/2017  9:04 AM    REVIEW OF SYSTEMS: Denies chest pain, abdominal pain, nausea or vomiting. No fever or chills. OBJECTIVE:      Visit Vitals    /81    Pulse 65    Temp 97.3 °F (36.3 °C)    Resp 15    Ht 5' 10\" (1.778 m)    Wt 119.7 kg (263 lb 14.3 oz)    SpO2 97%    BMI 37.86 kg/m2     Physical Exam:  GEN: Alert, NAD  EYES: conjunctiva normal, lids with out lesions  HEENT: MMM. ABDOMEN: Soft, non-tender. EXTREMITIES: No edema cyanosis  SKIN: no rashes or skin breakdown, no nodules  NEURO: Alert, oriented x3. Speech is fluent. Cranials: Face is symmetric. Smiles symmetrically. EOMI, VFF. Tongue is midline. Motor:Left arm weaak (0/5, with slightly increased tone at elbow, flexion contracture at hand). Left leg is slightly weak. Full strength elsewhere.     Current Facility-Administered Medications   Medication Dose Route Frequency    aspirin delayed-release tablet 325 mg  325 mg Oral ONCE    clopidogrel (PLAVIX) tablet 75 mg  75 mg Oral DAILY    [START ON 4/27/2017] aspirin delayed-release tablet 81 mg  81 mg Oral DAILY    pantoprazole (PROTONIX) tablet 40 mg  40 mg Oral ACB&D    [START ON 4/30/2017] evolocumab (REPATHA SURECLICK) pen injection 259 mg  140 mg SubCUTAneous Q 14 DAYS    sodium chloride (NS) flush 5 mL  5 mL IntraVENous Q8H    sodium chloride (NS) flush 5 mL  5 mL IntraVENous PRN    loratadine (CLARITIN) tablet 10 mg  10 mg Oral DAILY    fish oil-omega-3 fatty acids 340-1,000 mg capsule 1 Cap  1 Cap Oral DAILY    ranolazine ER (RANEXA) tablet 1,000 mg  1,000 mg Oral BID    isosorbide mononitrate ER (IMDUR) tablet 60 mg  60 mg Oral DAILY    evolocumab- nursing and pharmacy to clarify dosing and frequency   Other DAILY    citalopram (CELEXA) tablet 20 mg  20 mg Oral DAILY    topiramate (TOPAMAX) tablet 50 mg  50 mg Oral BID    SUMAtriptan (IMITREX) tablet 100 mg  100 mg Oral ONCE PRN    metoprolol tartrate (LOPRESSOR) tablet 12.5 mg  12.5 mg Oral QHS    ondansetron (ZOFRAN) injection 1 mg  1 mg IntraVENous Q6H PRN    acetaminophen (TYLENOL) tablet 650 mg  650 mg Oral Q4H PRN    acetaminophen (TYLENOL) suppository 650 mg  650 mg Rectal Q4H PRN    bisacodyl (DULCOLAX) tablet 5 mg  5 mg Oral DAILY PRN    bisacodyl (DULCOLAX) suppository 10 mg  10 mg Rectal DAILY PRN    albuterol (PROVENTIL VENTOLIN) nebulizer solution 2.5 mg  2.5 mg Nebulization Q4H PRN       Laboratory  Recent Results (from the past 24 hour(s))   PROTHROMBIN TIME + INR    Collection Time: 04/25/17 11:09 AM   Result Value Ref Range    Prothrombin time 14.4 11.5 - 15.2 sec    INR 1.2 0.8 - 1.2     PTT    Collection Time: 04/25/17 11:09 AM   Result Value Ref Range    aPTT 38.8 (H) 23.0 - 36.4 SEC   FIBRINOGEN    Collection Time: 04/25/17 11:09 AM   Result Value Ref Range    Fibrinogen 224 210 - 451 mg/dL   D DIMER    Collection Time: 04/25/17 11:09 AM   Result Value Ref Range    D DIMER 0.85 (H) <0.46 ug/ml(FEU)   EKG, 12 LEAD, SUBSEQUENT    Collection Time: 04/25/17  4:45 PM   Result Value Ref Range    Ventricular Rate 66 BPM    Atrial Rate 66 BPM    P-R Interval 162 ms    QRS Duration 110 ms    Q-T Interval 550 ms    QTC Calculation (Bezet) 576 ms    Calculated P Axis 16 degrees    Calculated R Axis -40 degrees    Calculated T Axis 43 degrees    Diagnosis       Normal sinus rhythm  Left axis deviation  Possible Lateral infarct (cited on or before 25-APR-2017)  Prolonged QT  Abnormal ECG  When compared with ECG of 25-APR-2017 06:22,  QT has lengthened     CARDIAC PANEL,(CK, CKMB & TROPONIN)    Collection Time: 04/25/17  5:00 PM   Result Value Ref Range     39 - 308 U/L    CK - MB 3.6 (H) <3.6 ng/ml    CK-MB Index 1.4 0.0 - 4.0 %    Troponin-I, Qt. <0.02 0.00 - 0.06 NG/ML   PROTHROMBIN TIME + INR    Collection Time: 04/25/17  7:40 PM   Result Value Ref Range    Prothrombin time 13.5 11.5 - 15.2 sec    INR 1.1 0.8 - 1.2     PTT    Collection Time: 04/25/17  7:40 PM   Result Value Ref Range    aPTT 38.4 (H) 23.0 - 36.4 SEC   FIBRINOGEN    Collection Time: 04/25/17  7:40 PM   Result Value Ref Range    Fibrinogen 224 210 - 451 mg/dL   D DIMER    Collection Time: 04/25/17  7:40 PM   Result Value Ref Range    D DIMER 0.58 (H) <0.46 ug/ml(FEU)   CARDIAC PANEL,(CK, CKMB & TROPONIN)    Collection Time: 04/26/17  5:13 AM   Result Value Ref Range     39 - 308 U/L    CK - MB 2.4 <3.6 ng/ml    CK-MB Index 1.3 0.0 - 4.0 %    Troponin-I, Qt. <0.02 0.00 - 0.06 NG/ML   MAGNESIUM    Collection Time: 04/26/17  5:13 AM   Result Value Ref Range    Magnesium 1.8 1.6 - 2.6 mg/dL   CBC W/O DIFF    Collection Time: 04/26/17  5:13 AM   Result Value Ref Range    WBC 7.4 4.6 - 13.2 K/uL    RBC 3.93 (L) 4.70 - 5.50 M/uL    HGB 13.7 13.0 - 16.0 g/dL    HCT 38.0 36.0 - 48.0 %    MCV 96.7 74.0 - 97.0 FL    MCH 34.9 (H) 24.0 - 34.0 PG    MCHC 36.1 31.0 - 37.0 g/dL    RDW 13.1 11.6 - 14.5 %    PLATELET 701 060 - 914 K/uL    MPV 10.3 9.2 - 37.6 FL   METABOLIC PANEL, BASIC    Collection Time: 04/26/17  5:13 AM   Result Value Ref Range    Sodium 142 136 - 145 mmol/L    Potassium 3.5 3.5 - 5.5 mmol/L    Chloride 107 100 - 108 mmol/L    CO2 24 21 - 32 mmol/L    Anion gap 11 3.0 - 18 mmol/L    Glucose 104 (H) 74 - 99 mg/dL    BUN 8 7.0 - 18 MG/DL    Creatinine 1.16 0.6 - 1.3 MG/DL    BUN/Creatinine ratio 7 (L) 12 - 20      GFR est AA >60 >60 ml/min/1.73m2    GFR est non-AA >60 >60 ml/min/1.73m2    Calcium 8.8 8.5 - 10.1 MG/DL       Radiology:  Mra Brain Wo Cont    Result Date: 4/25/2017  MRA head History: Left arm and leg weakness. Technique:  MR angiography of the head was performed utilizing 3-D time of flight axial acquisitions with multiplanar reconstructions. Findings: There is no evidence of aneurysm. There is no focal high-grade stenosis within the intracranial arteries. No focal carotid siphon stenosis is seen. The carotid terminus is unremarkable. An anterior communicating artery is present. The middle cerebral artery bifurcations are unremarkable. The vertebral arteries are relatively equal in size. PICA origins are unremarkable. The basilar artery is unremarkable. Posterior cerebral arteries are unremarkable. IMPRESSION: MRA head within normal limits. No focal high grade stenosis or aneurysm. Mra Neck W Cont    Result Date: 4/25/2017  MRA neck History: Left arm and leg weakness Technique:  MR angiography of the neck was performed utilizing contrast enhanced coronal acquisitions with multiplanar reconstructions. Findings: The left carotid artery bifurcation is mildly irregular. Estimated minimal luminal diameter of proximal ICA is 4.1 mm. Estimated diameter of normal distal cervical segment ICA is 4.7 mm. Estimated stenosis of left ICA based upon NASCET criteria is 13%. The right carotid artery bifurcation is slightly irregular. Estimated minimal luminal diameter of proximal ICA is 4.1 mm. Estimated diameter of normal distal cervical segment ICA is 4.9 mm. Estimated stenosis of right ICA based upon NASCET criteria is 15%. The origins of the great vessels are unremarkable. The vertebral arteries are relatively equal in size.   Moderate stenosis is present in the proximal left vertebral artery along its V1 segment. No high-grade vertebral artery stenosis is seen. The visualized portions of the intracranial arteries are unremarkable. IMPRESSION: 1. Mild proximal ICA irregularity without hemodynamically significant carotid stenosis based on NASCET criteria. 2.  Moderate proximal left vertebral artery, V1 segment stenosis. No high-grade vertebral artery stenosis. Mri Brain Wo Cont    Result Date: 4/25/2017  MRI brain History: Dizziness, left-sided facial numbness, prior CVA, possible acute CVA Comparison: No prior study Technique: Multiplanar multi sequence MR imaging of the brain was performed utilizing axial T2, FLAIR, diffusion, T2 gradient echo, coronal T2, and multiplanar T1 pre contrast imaging . No gadolinium was administered due to specific clinician request. Findings: The ventricles and sulci are normal in their size and configuration. Discrete T2/FLAIR hyperintense focus is present in the posterior superior right lentiform nucleus slightly extending into the corona radiata. There may been minimal central internal fluid signal. Slight ex vacuo dilatation of the right lateral ventricle suggested. Thin linear T2/FLAIR hyperintensity suggestive extending along the internal capsule into the cerebral peduncle suggesting Wallerian axonal degeneration. A few scattered small T2/FLAIR hyperintense white matter lesions are seen within the periventricular and subcortical white matter, mostly in the frontal parietal regions. No discrete corpus callosal, brainstem, or posterior fossa lesion is seen. There is no evidence of mass effect, hemorrhage, midline shift, or herniation. There is no abnormal restricted diffusion to suggest acute infarct. No susceptibility artifact is seen to suggest intraparenchymal hemorrhage. The major intracranial vascular flow voids are grossly normal. The orbits, paranasal sinuses, and mastoid air cells are unremarkable. IMPRESSION: 1.   No acute infarct, hemorrhage, mass effect, or herniation. 2.  Localized right posterior superior basal ganglia/periventricular signal abnormality, suspect small chronic infarct with associated axonal degeneration. 3. Few scattered small T2/FLAIR hyperintense white matter foci. This finding is quite nonspecific and is not particularly suggestive of a demyelinating process. Similar findings may be seen in patient's of all ages. Similar findings can be seen with migraine headaches or very mild chronic small vessel changes. Preliminary report of this study was provided by [facility]. Ct Head Wo Cont    Result Date: 4/24/2017  EXAMINATION:  CT head noncontrast COMPARISON: None HISTORY: Code S, stroke alert. TECHNIQUE: Noncontrasted axial images were obtained through the patient's brain from the vertex of the skull through the skull base. Bone and soft tissue windows were reviewed. One or more dose reduction techniques were used on this CT: automated exposure control, adjustment of the mAs and/or kVp according to patient's size, and iterative reconstruction techniques. The specific techniques utilized on this CT exam have been documented in the patient's electronic medical record. FINDINGS: No mass effect midline shift or intracranial hemorrhage. Small hypodensity in the right periventricular white matter could represent a focus of chronic small vessel ischemic change or perhaps a age indeterminate lacunar infarct. No CT evidence of large territorial infarct appreciated. Ventricles normal in caliber and contour. The visualized osseous structures demonstrate no acute abnormality. The visualized paranasal sinuses and mastoid air cells are clear. IMPRESSION: Focal hypodensity in the right frontal periventricular white matter, possibly a small focus of chronic microvascular ischemic change or a small age-indeterminate lacunar infarct. No CT evidence of large territorial infarct or hemorrhage.  Above code S results discussed with Dr. Jones Orn in the ER at 8:40 PM.    Xr Chest [facility]    Result Date: 4/25/2017  Chest, single view Indication: Suspected stroke. Comparison: 8/31/2014 Findings:  Portable upright AP view of the chest was obtained. Underexpanded lungs are present, with associated vascular crowding. Minimal left basilar opacity noted, somewhat linear in morphology. No pneumothorax or pleural effusion. Cardiac size and mediastinal contours are stable. No acute osseous abnormality. Impression: Underexpanded lungs with mild associated bronchovascular crowding and left basilar atelectasis.

## 2017-04-26 NOTE — DIABETES MGMT
GLYCEMIC CONTROL & NUTRITION:    - Discussed in rounds, no documented h/o DM  - BG within target range currently, elevated upon admission, A1C wnl  - recommend watch AM labs, if trends 150+ restart POCT + Humalog corrective coverage    Lab Results   Component Value Date/Time    Hemoglobin A1c 5.3 04/25/2017 12:15 AM       Recent Glucose Results:   Lab Results   Component Value Date/Time     (H) 04/26/2017 05:13 AM             Marland Kehr, MPH, RD

## 2017-04-26 NOTE — DISCHARGE INSTRUCTIONS
Taking Aspirin to Prevent Heart Attack and Stroke: Care Instructions  Your Care Instructions  Aspirin acts as a \"blood thinner. \" It prevents blood clots from forming. When taken during and after a heart attack, it can reduce your chance of dying. And it's used if you have a stent in your coronary artery. Also, aspirin helps certain people lower their risk of a heart attack or stroke. Be sure you know what dose of aspirin to take and how often to take it. Low-dose aspirin is typically 81 mg. But the dose for daily aspirin can range from 81 mg to 325 mg. Taking aspirin every day can cause bleeding. It may not be safe if you have stomach ulcers. And it may not be safe if you have high blood pressure that is not controlled. If you take aspirin pills every day, do not take ones that have other ingredients such as caffeine or sodium. Before you start to take aspirin, tell your doctor all the medicines, vitamins, herbal products, and supplements you take. Follow-up care is a key part of your treatment and safety. Be sure to make and go to all appointments, and call your doctor if you are having problems. It's also a good idea to know your test results and keep a list of the medicines you take. How can you care for yourself at home? · Take aspirin with a full glass of water unless your doctor tells you not to. Do not lie down right after you take it. · If you have a stent in your coronary artery, take your aspirin as your heart doctor says to. If another doctor says to stop taking the aspirin for any reason, talk to your heart doctor before you stop. · Do not chew or crush the coated or sustained-release forms of aspirin. · Ask your doctor if you can drink alcohol while you take aspirin. And ask how much you can drink. Too much alcohol with aspirin can cause stomach bleeding. · Do not take aspirin if you are pregnant, unless your doctor says it is okay. · Keep all aspirin out of children's reach.   · Throw aspirin away if it starts to smell like vinegar. · Do not take aspirin if you have gout or if you take prescription blood thinners, unless your doctor has told you to. · Do not take prescription or over-the-counter medicines, vitamins, herbal products, or supplements without talking to your doctor first. Chepe Mao the label before you take another over-the-counter medicine. Many contain aspirin. So they could cause you to take too much aspirin. · Talk with your doctor before you take a pain medicine. Ask which type of medicine you can take and how to take it safely with aspirin. · Tell your doctor or dentist before a surgery or procedure that you take aspirin. He or she will tell you if you should stop taking aspirin before your surgery or procedure. Make sure that you understand exactly what your doctor wants you to do. Where can you learn more? Go to http://ettaMyPermissionsfer.info/. Enter Y491 in the search box to learn more about \"Taking Aspirin to Prevent Heart Attack and Stroke: Care Instructions. \"  Current as of: January 27, 2016  Content Version: 11.2  © 0419-0916 [employer] Care instructions adapted under license by [employer] (which disclaims liability or warranty for this information). If you have questions about a medical condition or this instruction, always ask your healthcare professional. [address] any warranty or liability for your use of this information. Learning About Antiplatelet Medicines After a Stroke  Introduction  If you have had a stroke, you may have concerns about having another one. You want to do all you can do to avoid this. If your stroke was caused by a blood clot, one of the best things you can do is to take antiplatelet medicines. They can help prevent another stroke. In most cases, you don't take them if you had a stroke caused by a leak in an artery. These medicines are often called blood thinners.  But they don't thin your blood. They work to keep platelets from sticking together and forming blood clots. (A platelet is a type of blood cell.) Blood clots can cause a stroke if they block a blood vessel in the brain. So by preventing blood clots, you are helping to prevent a stroke. Examples  · Aspirin (Thao, Bufferin, Ecotrin)  · Aspirin with dipyridamole (Aggrenox)  · Clopidogrel (Plavix)  Possible side effects  These medicines make your blood take longer than normal to clot. This can cause bleeding, and you may bruise easily. In rare cases, they can cause you to bleed inside your body without an injury. If you have an injury, you might have bleeding that is hard to control. These medicines may have other side effects. Depending on which one you take, you may:  · Have diarrhea. · Feel sick to your stomach. · Have a headache. · Have some mild belly pain. You may have other side effects or reactions not listed here. Check the information that comes with your medicine. What to know about taking this medicine  · Be sure you get instructions about how to take your medicine safely. Blood thinners can cause serious bleeding problems. · Be safe with medicines. Take your medicines exactly as prescribed. Call your doctor if you think you are having a problem with your medicine. · Check with your doctor or pharmacist before you use any other medicines, including over-the-counter medicines. Make sure your doctor knows all of the medicines, vitamins, herbal products, and supplements you take. Taking some medicines together can cause problems. Where can you learn more? Go to http://etta-fer.info/. Enter M793 in the search box to learn more about \"Learning About Antiplatelet Medicines After a Stroke. \"  Current as of: January 27, 2016  Content Version: 11.2  © 0188-6550 [employer].  Care instructions adapted under license by [employer] (which disclaims liability or warranty for this information). If you have questions about a medical condition or this instruction, always ask your healthcare professional. [place] [address] any warranty or liability for your use of this information.

## 2017-04-26 NOTE — PROGRESS NOTES
2000- Report and care received, assessment completed per flow sheet. Alert, cooperative, NAD, denies pain. Denies any facial numbness or tingling. Neurological assessment completed and back to reported baseline. 2105-  updated regarding refusal of colace and metoprolol being held d/t SBP<120.    2300- Reassessment completed, including neurological assessment, and without change. 0300- Reassessment, including neurological assessment, completed and without change.

## 2017-04-27 ENCOUNTER — PATIENT OUTREACH (OUTPATIENT)
Dept: INTERNAL MEDICINE CLINIC | Age: 47
End: 2017-04-27

## 2017-04-27 NOTE — PROGRESS NOTES
Pt's wife, Fatimah(HIPAA) called today to make f/u appt for pt. Spoke to Quintin Henry about what a NN does and is, Pt's wife verbalized understanding and denies questions. Pt's appt made for 5/9/2017 at 2pm. Pt's wife states he is doing well, they do not have need for [facility]. He does use a cane for ambulation. She states pt has been eating and no problems swallowing. She states the dr in hospital also added ASA 81mg back to pt's daily meds. Per wife, they had just finished eating when pt stated he didn't feel right. Per wife the symptoms he was experiencing were similar to his first stroke. Took pt straight to ED and he was in protocol to receive tPA. Pt completed that therapy and has been doing well since. She has also made a f/u with Neuro in June. Pt's wife agreed to case management and seeing me after appt with . The wife states they have no needs at this time. This note will not be viewable in [address]

## 2017-04-27 NOTE — DISCHARGE SUMMARY
Mejia Singh 55 SUMMARY    Name:  Shereen Cannon  MR#:  [id number]  :  1970  Account #:  [de-identified]  Date of Adm:  2017  Date of Discharge:  2017      DIAGNOSES AT DISCHARGE:  1. Acute stroke, status post tissue plasminogen activator  administration. 2. History of stroke in . 3. Chronic migraines. 4. Coronary disease. 5. Hyperlipidemia. HOSPITAL CONSULTANTS: Arlen Garcia MD, Neurology. HOSPITAL SUMMARY: This patient came in with left-sided numbness  and symptoms concerning for acute stroke. He was seen by Neurology  in the emergency room and recommended to undergo TPA therapy. His head CT was negative for an acute stroke. He had been on Plavix  and aspirin in the past, but the aspirin was discontinued. His symptoms  resolved quickly and he started feeling much better with the  administration of the tPA. There is no visual deficit. No speech deficit. A brain MRI showed no acute infarct, hemorrhage, mass effect or  herniation. There was previous was evidence for a previous stroke,  consistent with the symptomatology he has from  that already. An MRA  of the neck was completed, that showed mild internal carotid artery  irregularity without hemodynamically significant carotid stenosis. No  high-grade vertebral artery stenosis. He had an MR angiogram of the  brain that was within normal limits. He has had an echocardiogram  completed that shows an EF of 55% to 60%. There was no evidence  for right-to-left shunt by contrast study and his left ventricle size was  normal. He has done great today with physical therapy. He walked over  200 feet. His blood pressure is stable at 95/60, pulse 62, temperature  97.8, respiratory rate 17, SaO2 97% on room air. Today, he is an overweight white male in no acute distress, oriented x3  and anxious to go home. Lungs are clear. Cardiac exam within normal  limits.  NEUROLOGIC: Exam is nonfocal. He usually walks with a cane. He is not a diabetic. He is on injectable antilipid therapy due to his  intolerance of oral statins, which he will need to continue. This is called  Repatha. He has followup lined up with his primary doctor and he needs to see  that physician, Kendra Sow MD, at [facility] primary care in one  week. He needs to followup with Dr. Beverly Kim, of Neurology in 2 weeks. MEDICATIONS FOR DISCHARGE: Include:  1. Plavix 75 mg daily. 2. Aspirin 81 mg daily. 3. Continue his Allegra. 4. Celexa. 5. Repatha injections. 6. Fish oil. 7. Imdur. 8. Lopressor. 9. Protonix. 10. Ranexa. 11. Imitrex as needed. 12. Topamax that he was on prior to admission with no changes. He is on a cardiac-type diet, continue low-fat intake at home. Watch his  sodium. He should never be without his antilipid therapy as he had a 4-  month gap of not receiving that and with his family history of stroke and  likely familial hypercholesterolemia he needs to make sure that he  always has access to this medication. Followup as noted. I have  examined him today. I have reviewed the plan of care with Neurology  and he is cleared from their standpoint. Thirty-five minutes on discharge time.         MD SCOTT Alicia / James Raines  D:  04/26/2017   13:41  T:  04/27/2017   07:47  Job #:  [id number]

## 2017-05-03 ENCOUNTER — TELEPHONE (OUTPATIENT)
Dept: INTERNAL MEDICINE CLINIC | Age: 47
End: 2017-05-03

## 2017-05-03 NOTE — TELEPHONE ENCOUNTER
5/3/17 @ 12:03 Called  PA for pantoprazole initiated with rep Under[place]. [id number] Decision time 24 hours. May submit additional notes by fax to [phone] if available.

## 2017-05-04 LAB
ATRIAL RATE: 68 BPM
ATRIAL RATE: 73 BPM
CALCULATED P AXIS, ECG09: 19 DEGREES
CALCULATED P AXIS, ECG09: 8 DEGREES
CALCULATED R AXIS, ECG10: -40 DEGREES
CALCULATED R AXIS, ECG10: -46 DEGREES
CALCULATED T AXIS, ECG11: 61 DEGREES
CALCULATED T AXIS, ECG11: 62 DEGREES
DIAGNOSIS, 93000: NORMAL
DIAGNOSIS, 93000: NORMAL
P-R INTERVAL, ECG05: 156 MS
P-R INTERVAL, ECG05: 160 MS
Q-T INTERVAL, ECG07: 418 MS
Q-T INTERVAL, ECG07: 440 MS
QRS DURATION, ECG06: 110 MS
QRS DURATION, ECG06: 110 MS
QTC CALCULATION (BEZET), ECG08: 460 MS
QTC CALCULATION (BEZET), ECG08: 467 MS
VENTRICULAR RATE, ECG03: 68 BPM
VENTRICULAR RATE, ECG03: 73 BPM

## 2017-05-09 ENCOUNTER — OFFICE VISIT (OUTPATIENT)
Dept: INTERNAL MEDICINE CLINIC | Age: 47
End: 2017-05-09

## 2017-05-09 ENCOUNTER — PATIENT OUTREACH (OUTPATIENT)
Dept: INTERNAL MEDICINE CLINIC | Age: 47
End: 2017-05-09

## 2017-05-09 ENCOUNTER — DOCUMENTATION ONLY (OUTPATIENT)
Dept: INTERNAL MEDICINE CLINIC | Age: 47
End: 2017-05-09

## 2017-05-09 VITALS
OXYGEN SATURATION: 95 % | RESPIRATION RATE: 16 BRPM | BODY MASS INDEX: 37.22 KG/M2 | DIASTOLIC BLOOD PRESSURE: 70 MMHG | HEART RATE: 63 BPM | SYSTOLIC BLOOD PRESSURE: 100 MMHG | WEIGHT: 260 LBS | TEMPERATURE: 96.6 F | HEIGHT: 70 IN

## 2017-05-09 DIAGNOSIS — I63.9 ACUTE CVA (CEREBROVASCULAR ACCIDENT) (HCC): Primary | ICD-10-CM

## 2017-05-09 DIAGNOSIS — I69.354 HEMIPARESIS AFFECTING LEFT SIDE AS LATE EFFECT OF CEREBROVASCULAR ACCIDENT (CVA) (HCC): ICD-10-CM

## 2017-05-09 DIAGNOSIS — I25.10 CORONARY ARTERY DISEASE INVOLVING NATIVE CORONARY ARTERY OF NATIVE HEART WITHOUT ANGINA PECTORIS: ICD-10-CM

## 2017-05-09 DIAGNOSIS — K21.9 GASTROESOPHAGEAL REFLUX DISEASE WITHOUT ESOPHAGITIS: ICD-10-CM

## 2017-05-09 RX ORDER — ASPIRIN 81 MG/1
81 TABLET ORAL DAILY
Qty: 90 TAB | Refills: 3 | Status: SHIPPED | OUTPATIENT
Start: 2017-05-09

## 2017-05-09 RX ORDER — PANTOPRAZOLE SODIUM 40 MG/1
TABLET, DELAYED RELEASE ORAL
Qty: 30 TAB | Refills: 11 | Status: SHIPPED | OUTPATIENT
Start: 2017-05-09 | End: 2017-10-09 | Stop reason: SDUPTHER

## 2017-05-09 NOTE — PROGRESS NOTES
HISTORY OF PRESENT ILLNESS  Franky Allen is a 48 y.o. male. Extremity Weakness   The history is provided by the patient and caregiver. This is a recurrent problem. Episode onset: 4/24/2017. There was left facial and left lower extremity focality noted. Primary symptoms include focal weakness, loss of balance, movement disorder and mental status change. Pertinent negatives include no visual change and no unresponsiveness. Primary symptoms comment: dizziness, was noted to have a red face. There has been no fever. Associated symptoms include headaches. Meds prior to arrival: In the ER had numerous scans done and then was given TPA in the emergency room. Associated medical issues do not include trauma. [address] 4/26/2017  Restarted up baby asa after suspected CVA  No bleeding, his symptoms improved greatly over the next several days, states some residual left-sided weakness but it is pretty minimal.  Was not referred for further physical therapy, does his own exercises. No issues with swallowing breathing or exertional chest pain. No cough or fever. Neurologist in the hospital recommended follow-up with him. Hubert Channing finally approved by [employer]. Current Outpatient Prescriptions   Medication Sig Dispense Refill    aspirin delayed-release 81 mg tablet Take 1 Tab by mouth daily. 1 Tab 0    metoprolol tartrate (LOPRESSOR) 25 mg tablet Take 0.5 Tabs by mouth nightly. 45 Tab 3    topiramate (TOPAMAX) 50 mg tablet Take 1 Tab by mouth two (2) times a day. 60 Tab 5    SUMAtriptan (IMITREX) 100 mg tablet Take 1 Tab by mouth once as needed for Migraine. 10 Tab 5    citalopram (CELEXA) 20 mg tablet TAKE ONE & ONE-HALF TABLETS BY MOUTH ONCE DAILY 50 Tab 11    clopidogrel (PLAVIX) 75 mg tablet Take 1 Tab by mouth daily. 30 Tab 11    pantoprazole (PROTONIX) 20 mg tablet TAKE ONE TABLET BY MOUTH TWICE DAILY 60 Tab 11    evolocumab (REPATHA SURECLICK) pen injection by SubCUTAneous route.       isosorbide mononitrate ER (IMDUR) 60 mg CR tablet TAKE ONE TABLET BY MOUTH IN THE MORNING 30 Tab 11    Ranolazine (RANEXA) 1,000 mg Tb12 Take 1 Tab by mouth two (2) times a day. 60 Tab 11    omega-3 fatty acids-vitamin e (FISH OIL) 1,000 mg cap Take 1 Cap by mouth.  fexofenadine (ALLEGRA) 180 mg tablet Take  by mouth daily. Review of Systems   Constitutional: Negative for fever. Gastrointestinal: Negative for blood in stool and heartburn. Musculoskeletal: Negative for falls. Neurological: Positive for focal weakness, weakness, headaches and loss of balance. Physical Exam  Visit Vitals    /70 (BP 1 Location: Right arm, BP Patient Position: Sitting)    Pulse 63    Temp 96.6 °F (35.9 °C) (Oral)    Resp 16    Ht 5' 10\" (1.778 m)    Wt 260 lb (117.9 kg)    SpO2 95%    BMI 37.31 kg/m2     Well developed well nourished no acute distress. Pupils equal round react to light, extraocular muscles intact. Tympanic membranes within normal limits throat unremarkable  Cranial nerves II through XII are intact. Neck unremarkable  Heart regular rate and rhythm without clicks murmurs rubs  Lungs are clear to auscultation  Abdomen soft. Extremities, left lower ext motor 4 out of 5 left, reflexes 2+ equal bilaterally in lower ext, dense paralysis left hand arm  Labs from today were reviewed  no, labs done previously were reviewed  yes, Labs done in ER were reviewed  yes, Additional labs are ordered  no,      ASSESSMENT and PLAN  Encounter Diagnoses   Name Primary?     Acute CVA (cerebrovascular accident) ([address] Yes    Hemiparesis affecting left side as late effect of cerebrovascular accident (CVA) ([address]     Coronary artery disease involving native coronary artery of native heart without angina pectoris     Gastroesophageal reflux disease without esophagitis      Orders Placed This Encounter    REFERRAL TO NEUROLOGY    aspirin delayed-release 81 mg tablet    pantoprazole (PROTONIX) 40 mg tablet seems to have had a good recovery from his stroke. Continue anticoagulants aspirin and Plavix. Discussed possible side affects, precautions, and drug interactions and possible benefits of the medication(s). Follow-up Disposition:  Return in about 2 months (around 7/9/2017) for routine follow up.

## 2017-05-09 NOTE — MR AVS SNAPSHOT
Visit Information Date & Time Provider Department Dept. Phone Encounter #  
 5/9/2017  2:00 PM Sidney Hoskins MD [facility] [phone] [id number] Follow-up Instructions Return in about 2 months (around 7/9/2017) for routine follow up. Your Appointments 5/23/2017 10:00 AM  
3 MONTH with Ina Hong MD  
[facility]) Appt Note: . [address]  
[phone] [address]  
  
    
 7/11/2017  2:45 PM  
ROUTINE CARE with Sidney Hoskins MD  
[facility] Appt Note: f/u on HTN $0cp $0pb [facility]3/7/17  
 [address] [place] [address]. [address] [facility] [address]  
[phone]  
  
   
 [address] [place] [address] [address]  
  
    
 8/1/2017  9:00 AM  
6 MONTH with Ina Hong MD  
[facility]) Appt Note: Per Dr C $0CP REM  
 [address]  
[phone] [address] [place] [address] Upcoming Health Maintenance Date Due INFLUENZA AGE 9 TO ADULT 8/1/2017 DTaP/Tdap/Td series (2 - Td) 11/30/2025 Allergies as of 5/9/2017  Review Complete On: 5/9/2017 By: Sidney Hoskins MD  
  
 Severity Noted Reaction Type Reactions Crestor [Rosuvastatin]  12/11/2014    Rash Iodinated Contrast Media - Oral And Iv Dye  02/26/2013    Hives Statins-hmg-coa Reductase Inhibitors  07/26/2016    Myalgia Current Immunizations  Reviewed on 9/12/2016 Name Date Influenza Vaccine (Quad) PF 9/12/2016, 11/30/2015 Tdap 11/30/2015 Not reviewed this visit You Were Diagnosed With   
  
 Codes Comments Acute CVA (cerebrovascular accident) ([facility] [address]    -  Primary ICD-10-CM: I63.9 ICD-9-CM: 434.91  Hemiparesis affecting left side as late effect of cerebrovascular accident (CVA) [facility])     ICD-10-CM: Q67.531 ICD-9-CM: 438.20 Coronary artery disease involving native coronary artery of native heart without angina pectoris     ICD-10-CM: I25.10 ICD-9-CM: 414.01 Gastroesophageal reflux disease without esophagitis     ICD-10-CM: K21.9 ICD-9-CM: 530.81 Vitals BP Pulse Temp Resp Height(growth percentile) Weight(growth percentile) 100/70 (BP 1 Location: Right arm, BP Patient Position: Sitting) 63 96.6 °F (35.9 °C) (Oral) 16 5' 10\" (1.778 m) 260 lb (117.9 kg) SpO2 BMI Smoking Status 95% 37.31 kg/m2 Former Smoker BMI and BSA Data Body Mass Index Body Surface Area  
 37.31 kg/m 2 2.41 m 2 Preferred Pharmacy Pharmacy Name [facility] [address] [place] Shashank [phone] Your Updated Medication List  
  
   
This list is accurate as of: 5/9/17  2:54 PM.  Always use your most recent med list. ALLEGRA 180 mg tablet Generic drug:  fexofenadine Take  by mouth daily. aspirin delayed-release 81 mg tablet Take 1 Tab by mouth daily. citalopram 20 mg tablet Commonly known as:  CELEXA  
TAKE ONE & ONE-HALF TABLETS BY MOUTH ONCE DAILY  
  
 clopidogrel 75 mg Tab Commonly known as:  PLAVIX Take 1 Tab by mouth daily. evolocumab pen injection Commonly known as:  John Lowers  
by SubCUTAneous route. FISH OIL 1,000 mg Cap Generic drug:  omega-3 fatty acids-vitamin e Take 1 Cap by mouth.  
  
 isosorbide mononitrate ER 60 mg CR tablet Commonly known as:  IMDUR  
TAKE ONE TABLET BY MOUTH IN THE MORNING  
  
 metoprolol tartrate 25 mg tablet Commonly known as:  LOPRESSOR Take 0.5 Tabs by mouth nightly. pantoprazole 40 mg tablet Commonly known as:  PROTONIX  
TAKE ONE TABLET BY MOUTH TWICE DAILY Ranolazine 1,000 mg Tb12 Commonly known as:  RANEXA Take 1 Tab by mouth two (2) times a day. SUMAtriptan 100 mg tablet Commonly known as:  IMITREX Take 1 Tab by mouth once as needed for Migraine. topiramate 50 mg tablet Commonly known as:  TOPAMAX Take 1 Tab by mouth two (2) times a day. Prescriptions Sent to Pharmacy Refills  
 aspirin delayed-release 81 mg tablet 3 Sig: Take 1 Tab by mouth daily. Class: Normal  
 Pharmacy: [facility], [address] Ph #: [phone] Route: Oral  
 pantoprazole (PROTONIX) 40 mg tablet 11 Sig: TAKE ONE TABLET BY MOUTH TWICE DAILY Class: Normal  
 Pharmacy: [facility], [address] Ph #: [phone] We Performed the Following REFERRAL TO NEUROLOGY [TIS44 Custom] Comments:  
 Please evaluate patient for recurrent CVA, had TPA done in hospital.  
  
Follow-up Instructions Return in about 2 months (around 7/9/2017) for routine follow up. Referral Information Referral ID Referred By Referred To  
  
 [id number] Govind TINAJERO MD [address], [address] [facility] Phone: [phone] Fax: [phone] Visits Status Start Date End Date 1 New Request 5/9/17 5/9/18 If your referral has a status of pending review or denied, additional information will be sent to support the outcome of this decision. Introducing [facility]! Dear Naman Velarde: Thank you for requesting a [employer] account. Our records indicate that you already have an active [employer] account. You can access your account anytime at [email or web address]. [email or web address] Did you know that you can access your hospital and ER discharge instructions at any time in [employer]? You can also review all of your test results from your hospital stay or ER visit. Additional Information If you have questions, please visit the Frequently Asked Questions section of the [employer] website at [email or web address] Remember, MyChart is NOT to be used for urgent needs. For medical emergencies, dial 911. Now available from your iPhone and Android! Please provide this summary of care documentation to your next provider. Your primary care clinician is listed as Gavin Del Cid. If you have any questions after today's visit, please call [phone].

## 2017-05-09 NOTE — PROGRESS NOTES
Chief Complaint   Patient presents with    Cerebrovascular Accident     BS adm 4/24/17     I have reviewed the patient's medical history in detail and updated the computerized patient record. Health Maintenance reviewed. 1. Have you been to the ER, urgent care clinic since your last visit? Hospitalized since your last visit? yes    2. Have you seen or consulted any other health care providers outside of the [address] [place] [address] since your last visit? Include any pap smears or colon screening.   Yes YURY merrimaculac

## 2017-05-10 NOTE — PROGRESS NOTES
5/9/17  Pt seen by NN before seeing Marcela Councilman. Explained to patient what a NN is and does, Patient verbalized understanding and denies questions. Patient agrees to case management and follow up phone calls/meeting with NN. Had spoken to wife previously. Pt and wife deny any needs at this time. Pt's biggest struggle is with his memory, he can remember things up to day of stroke but after stroke pt states he has \"blank areas in my memory\". Pt has f/u with neuro, expressed importance of keeping that appt; pt and pt's wife agree. Pt's wife did inquire if pt's insurance would cover blood pressure cuff. Explained to pt's wife that medicaid will pay for it but finding somewhere to bill [place] for it is the problem. I told pt and his wife that I had researched this earlier and for them to call medicaid. Medicaid needs to tell them what brand/type they cover and where they can buy it. Pt's wife said she will call but if they don't cover it she will buy one for pt. I also told pt's wife ask medicaid if they will reimburse the cost if you purchase it. Pt and pt's wife consent to f/u and have number to reach NN, if any questions. This note will not be viewable in [address]

## 2017-05-17 ENCOUNTER — TELEPHONE (OUTPATIENT)
Dept: INTERNAL MEDICINE CLINIC | Age: 47
End: 2017-05-17

## 2017-05-17 NOTE — TELEPHONE ENCOUNTER
Patients wife called to speak with Kartik King about his prescription for the protonix. She said the pharmacy said it requires auth. Please call her at [phone]. Mrs. Jauregui Points said this is his 2nd week being out of med.

## 2017-05-18 NOTE — TELEPHONE ENCOUNTER
Called to notify patient that PA has been approved for Pantoprazole 40mg every day - #30 for 30  Days - approved from 5/8/17 through 8/16/17 - [id number] -  no answer - LM - also notified Rebekah Urrutia in [place] that this has been completed  Tera Ng LPN  7/33/4719  7:84 PM

## 2017-05-22 ENCOUNTER — PATIENT OUTREACH (OUTPATIENT)
Dept: INTERNAL MEDICINE CLINIC | Age: 47
End: 2017-05-22

## 2017-05-23 ENCOUNTER — OFFICE VISIT (OUTPATIENT)
Dept: CARDIOLOGY CLINIC | Age: 47
End: 2017-05-23

## 2017-05-23 VITALS
BODY MASS INDEX: 37.59 KG/M2 | SYSTOLIC BLOOD PRESSURE: 120 MMHG | HEIGHT: 70 IN | OXYGEN SATURATION: 97 % | HEART RATE: 62 BPM | WEIGHT: 262.6 LBS | RESPIRATION RATE: 16 BRPM | DIASTOLIC BLOOD PRESSURE: 82 MMHG

## 2017-05-23 DIAGNOSIS — E78.2 MIXED HYPERLIPIDEMIA: ICD-10-CM

## 2017-05-23 DIAGNOSIS — I63.341 CEREBRAL INFARCTION DUE TO THROMBOSIS OF RIGHT CEREBELLAR ARTERY (HCC): ICD-10-CM

## 2017-05-23 DIAGNOSIS — I25.10 CORONARY ARTERY DISEASE INVOLVING NATIVE CORONARY ARTERY OF NATIVE HEART WITHOUT ANGINA PECTORIS: Primary | ICD-10-CM

## 2017-05-23 DIAGNOSIS — Z98.61 S/P PTCA (PERCUTANEOUS TRANSLUMINAL CORONARY ANGIOPLASTY): ICD-10-CM

## 2017-05-23 RX ORDER — PANTOPRAZOLE SODIUM 20 MG/1
40 TABLET, DELAYED RELEASE ORAL DAILY
COMMUNITY
Start: 2017-03-31 | End: 2017-07-11 | Stop reason: SDUPTHER

## 2017-05-23 NOTE — PROGRESS NOTES
5/23/2017 10:55 AM      Subjective:     Vishal Villegas is here for f/u visit. Recently admitted at [facility] for acute CVA and received tPA. Now feels better. Denies any cardiac symptoms. Now back on repatha for last months or so. Visit Vitals    /82 (BP 1 Location: Right arm, BP Patient Position: Sitting)    Pulse 62    Resp 16    Ht 5' 10\" (1.778 m)    Wt 262 lb 9.6 oz (119.1 kg)    SpO2 97%    BMI 37.68 kg/m2     Current Outpatient Prescriptions   Medication Sig    aspirin delayed-release 81 mg tablet Take 1 Tab by mouth daily.  pantoprazole (PROTONIX) 40 mg tablet TAKE ONE TABLET BY MOUTH TWICE DAILY    metoprolol tartrate (LOPRESSOR) 25 mg tablet Take 0.5 Tabs by mouth nightly.  topiramate (TOPAMAX) 50 mg tablet Take 1 Tab by mouth two (2) times a day.  SUMAtriptan (IMITREX) 100 mg tablet Take 1 Tab by mouth once as needed for Migraine.  citalopram (CELEXA) 20 mg tablet TAKE ONE & ONE-HALF TABLETS BY MOUTH ONCE DAILY    clopidogrel (PLAVIX) 75 mg tablet Take 1 Tab by mouth daily.  evolocumab (REPATHA SURECLICK) pen injection by SubCUTAneous route.  isosorbide mononitrate ER (IMDUR) 60 mg CR tablet TAKE ONE TABLET BY MOUTH IN THE MORNING    Ranolazine (RANEXA) 1,000 mg Tb12 Take 1 Tab by mouth two (2) times a day.  omega-3 fatty acids-vitamin e (FISH OIL) 1,000 mg cap Take 1 Cap by mouth.  fexofenadine (ALLEGRA) 180 mg tablet Take  by mouth daily.  pantoprazole (PROTONIX) 20 mg tablet Take 40 mg by mouth daily. No current facility-administered medications for this visit.           Objective:      Visit Vitals    /82 (BP 1 Location: Right arm, BP Patient Position: Sitting)    Pulse 62    Resp 16    Ht 5' 10\" (1.778 m)    Wt 262 lb 9.6 oz (119.1 kg)    SpO2 97%    BMI 37.68 kg/m2       Data Review:     EKG: Sinus bradycardia, non specific T wave changes    Reviewed and/or ordered active problem list, medication list tests    Past Medical History:   Diagnosis Date    CAD (coronary artery disease)     GERD (gastroesophageal reflux disease)     Headache     Hypertension     Stroke ([facility] [address] 2011      Past Surgical History:   Procedure Laterality Date    CARDIAC SURG PROCEDURE UNLIST      HX CORONARY STENT PLACEMENT       Allergies   Allergen Reactions    Crestor [Rosuvastatin] Rash    Iodinated Contrast Media - Oral And Iv Dye Hives    Statins-Hmg-Coa Reductase Inhibitors Myalgia      Family History   Problem Relation Age of Onset    Heart Disease Mother     Hypertension Mother     Diabetes Father     Heart Disease Father       Social History     Social History    Marital status: SINGLE     Spouse name: N/A    Number of children: 0    Years of education: N/A     Occupational History    disabled Not Employed     Social History Main Topics    Smoking status: Former Smoker     Packs/day: 0.30     Years: 30.00     Types: Cigarettes     Quit date: 9/19/2011    Smokeless tobacco: Never Used    Alcohol use No    Drug use: No    Sexual activity: Not Currently     Other Topics Concern    Not on file     Social History Narrative    Lives on his own friends and relatives look in on him. Review of Systems     General: Not Present- Anorexia, Chills, Dietary Changes, Fatigue, Fever, Medication Changes, Night Sweats, Weight Gain > 10lbs. and Weight Loss > 10lbs. .  Skin: Not Present- Bruising and Excessive Sweating. HEENT: Not Present- Headache, Visual Loss and Vertigo. Respiratory: Not Present- Cough, Decreased Exercise Tolerance, Difficulty Breathing, Snoring and Wheezing. Cardiovascular: Not Present- Abnormal Blood Pressure, Chest Pain, Claudications, Difficulty Breathing On Exertion, Edema, Fainting / Blacking Out, Irregular Heart Beat, Night Cramps, Orthopnea, Palpitations, Paroxysmal Nocturnal Dyspnea, Rapid Heart Rate, Shortness of Breath and Swelling of Extremities.   Gastrointestinal: Not Present- Black, Tarry Stool, Bloody Stool, Diarrhea, Hematemesis, Rectal Bleeding and Vomiting. Musculoskeletal: Not Present- Muscle Pain and Muscle Weakness. Neurological: Not Present- Dizziness. Psychiatric: Not Present- Depression. Endocrine: Not Present- Cold Intolerance, Heat Intolerance and Thyroid Problems. Hematology: Not Present- Abnormal Bleeding, Anemia, Blood Clots and Easy Bruising.       Physical Exam   The physical exam findings are as follows:       General   Mental Status - Alert. General Appearance - Not in acute distress. Chest and Lung Exam   Inspection: Accessory muscles - No use of accessory muscles in breathing. Auscultation:   Breath sounds: - Normal.      Cardiovascular   Inspection: Jugular vein - Bilateral - Inspection Normal.  Palpation/Percussion:   Apical Impulse: - Normal.  Auscultation: Rhythm - Regular. Heart Sounds - S1 WNL and S2 WNL. No S3 or S4. Murmurs & Other Heart Sounds: Auscultation of the heart reveals - No Murmurs. Carotid arteries - No Carotid bruit. Peripheral Vascular   Upper Extremity: Inspection - Bilateral - No Cyanotic nailbeds or Digital clubbing. Lower Extremity:   Palpation: Edema - Bilateral - No edema. Assessment:       ICD-10-CM ICD-9-CM    1. Coronary artery disease involving native coronary artery of native heart without angina pectoris I25.10 414.01    2. Mixed hyperlipidemia E78.2 272.2 AMB POC EKG ROUTINE W/ 12 LEADS, INTER & REP   3. 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 S/P PTCA (percutaneous transluminal coronary angioplasty) Z98.61 V45.82        Plan:     1. CAD: stable. Medical treatment. Normal stress test last Jan. .   2. Hyperlipidemia: on repatha now. Will check labs in August.    3. HTN: stable. 4. CVA: f/u with neurology.

## 2017-05-23 NOTE — MR AVS SNAPSHOT
Visit Information Date & Time Provider Department Dept. Phone Encounter #  
 5/23/2017 10:00 AM Bridgett Lopez, [address] [facility] [phone] [id number] Follow-up Instructions Return in about 3 months (around 8/23/2017). Your Appointments 7/11/2017  2:45 PM  
ROUTINE CARE with Mark Motley MD  
[facility] Appt Note: f/u on HTN $0cp $0pb [facility]3/7/17  
 [address] [place] [address]  
[phone]  
  
   
 [address]  
  
    
 8/1/2017  9:00 AM  
6 MONTH with Bridgett Lopez MD  
[facility] Appt Note: Per Dr JIMENEZ $0CP REM  
 [address] [facility]  
[phone] [address] [facility] Upcoming Health Maintenance Date Due INFLUENZA AGE 9 TO ADULT 8/1/2017 DTaP/Tdap/Td series (2 - Td) 11/30/2025 Allergies as of 5/23/2017  Review Complete On: 5/23/2017 By: Bridgett Lopez MD  
  
 Severity Noted Reaction Type Reactions Crestor [Rosuvastatin]  12/11/2014    Rash Iodinated Contrast Media - Oral And Iv Dye  02/26/2013    Hives Statins-hmg-coa Reductase Inhibitors  07/26/2016    Myalgia Current Immunizations  Reviewed on 9/12/2016 Name Date Influenza Vaccine (Quad) PF 9/12/2016, 11/30/2015 Tdap 11/30/2015 Not reviewed this visit You Were Diagnosed With   
  
 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   
 S/P PTCA (percutaneous transluminal coronary angioplasty)     ICD-10-CM: Z98.61 
 ICD-9-CM: V45.82 Vitals BP Pulse Resp Height(growth percentile) Weight(growth percentile) SpO2  
 120/82 (BP 1 Location: Right arm, BP Patient Position: Sitting) 62 16 5' 10\" (1.778 m) 262 lb 9.6 oz (119.1 kg) 97% BMI Smoking Status 37.68 kg/m2 Former Smoker Vitals History BMI and BSA Data Body Mass Index Body Surface Area  
 37.68 kg/m 2 2.43 m 2 Preferred Pharmacy Pharmacy Name Phone [facility] Ruslan Colindres [address] [place] [address] Kobe Fung [phone] Your Updated Medication List  
  
   
This list is accurate as of: 5/23/17 10:55 AM.  Always use your most recent med list. ALLEGRA 180 mg tablet Generic drug:  fexofenadine Take  by mouth daily. aspirin delayed-release 81 mg tablet Take 1 Tab by mouth daily. citalopram 20 mg tablet Commonly known as:  CELEXA  
TAKE ONE & ONE-HALF TABLETS BY MOUTH ONCE DAILY  
  
 clopidogrel 75 mg Tab Commonly known as:  PLAVIX Take 1 Tab by mouth daily. evolocumab pen injection Commonly known as:  Rama Creeks  
by SubCUTAneous route. FISH OIL 1,000 mg Cap Generic drug:  omega-3 fatty acids-vitamin e Take 1 Cap by mouth.  
  
 isosorbide mononitrate ER 60 mg CR tablet Commonly known as:  IMDUR  
TAKE ONE TABLET BY MOUTH IN THE MORNING  
  
 metoprolol tartrate 25 mg tablet Commonly known as:  LOPRESSOR Take 0.5 Tabs by mouth nightly. * pantoprazole 20 mg tablet Commonly known as:  PROTONIX Take 40 mg by mouth daily. * pantoprazole 40 mg tablet Commonly known as:  PROTONIX  
TAKE ONE TABLET BY MOUTH TWICE DAILY Ranolazine 1,000 mg Tb12 Commonly known as:  RANEXA Take 1 Tab by mouth two (2) times a day. SUMAtriptan 100 mg tablet Commonly known as:  IMITREX Take 1 Tab by mouth once as needed for Migraine. topiramate 50 mg tablet Commonly known as:  TOPAMAX Take 1 Tab by mouth two (2) times a day. * Notice: This list has 2 medication(s) that are the same as other medications prescribed for you. Read the directions carefully, and ask your doctor or other care provider to review them with you. We Performed the Following AMB POC EKG ROUTINE W/ 12 LEADS, INTER & REP [82342 CPT(R)] Follow-up Instructions Return in about 3 months (around 8/23/2017). Introducing [facility] & HEALTH SERVICES! Dear [employer]: Thank you for requesting a Petenko account. Our records indicate that you already have an active Petenko account. You can access your account anytime at [email or web address] Did you know that you can access your hospital and ER discharge instructions at any time in Petenko? You can also review all of your test results from your hospital stay or ER visit. Additional Information If you have questions, please visit the Frequently Asked Questions section of the Petenko website at [email or web address] Remember, Petenko is NOT to be used for urgent needs. For medical emergencies, dial 911. Now available from your iPhone and Android! Please provide this summary of care documentation to your next provider. Your primary care clinician is listed as Marylee Rhodes. If you have any questions after today's visit, please call [phone].

## 2017-05-23 NOTE — PROGRESS NOTES
Chief Complaint   Patient presents with   [facility] Follow Up     for stroke    Shortness of Breath     on exertion

## 2017-05-23 NOTE — TELEPHONE ENCOUNTER
Last office visit:  5/9/17  Last refilled:  5/4/16 #60 X 11 refills  No changes  Due for return visit in 1 month

## 2017-05-24 RX ORDER — RANOLAZINE 1000 MG/1
1000 TABLET, EXTENDED RELEASE ORAL 2 TIMES DAILY
Qty: 60 TAB | Refills: 5 | Status: SHIPPED | OUTPATIENT
Start: 2017-05-24 | End: 2017-10-09 | Stop reason: SDUPTHER

## 2017-05-31 RX ORDER — TOPIRAMATE 50 MG/1
TABLET, FILM COATED ORAL
Qty: 180 TAB | Refills: 1 | Status: SHIPPED | OUTPATIENT
Start: 2017-05-31 | End: 2017-10-09 | Stop reason: SDUPTHER

## 2017-05-31 RX ORDER — ISOSORBIDE MONONITRATE 60 MG/1
TABLET, EXTENDED RELEASE ORAL
Qty: 90 TAB | Refills: 1 | Status: SHIPPED | OUTPATIENT
Start: 2017-05-31 | End: 2017-10-09 | Stop reason: SDUPTHER

## 2017-05-31 NOTE — TELEPHONE ENCOUNTER
Requested Prescriptions     Pending Prescriptions Disp Refills    topiramate (TOPAMAX) 50 mg tablet [Pharmacy Med Name: TOPIRAMATE 50MG     TAB] 180 Tab 1     Sig: TAKE ONE TABLET BY MOUTH TWICE DAILY    isosorbide mononitrate ER (IMDUR) 60 mg CR tablet [Pharmacy Med Name: [place] ER 60MG TAB] 90 Tab 1     Sig: TAKE ONE TABLET BY MOUTH IN THE MORNING     Last office visit 5/9/17  Future appt [facility] 7/1/17  Last filled 11/8/16 and 5/13/16  Changes made to medication on last visit none

## 2017-06-02 ENCOUNTER — PATIENT OUTREACH (OUTPATIENT)
Dept: INTERNAL MEDICINE CLINIC | Age: 47
End: 2017-06-02

## 2017-06-02 ENCOUNTER — TELEPHONE (OUTPATIENT)
Dept: INTERNAL MEDICINE CLINIC | Age: 47
End: 2017-06-02

## 2017-06-02 NOTE — TELEPHONE ENCOUNTER
Patient's wife called in reference to her anjumbad's ranexa needing to be authorized. Please call her at [phone] ext [phone].

## 2017-06-05 NOTE — TELEPHONE ENCOUNTER
Notified patient's wife that approval has been received for 1 year #PA [id number] - 6/5/17 - 6/5/18 - called [phone] - also notified Devika Archuleta in [place], [place]  7/5/5584  00:43 AM

## 2017-07-11 ENCOUNTER — OFFICE VISIT (OUTPATIENT)
Dept: INTERNAL MEDICINE CLINIC | Age: 47
End: 2017-07-11

## 2017-07-11 VITALS
SYSTOLIC BLOOD PRESSURE: 102 MMHG | WEIGHT: 266 LBS | OXYGEN SATURATION: 96 % | DIASTOLIC BLOOD PRESSURE: 64 MMHG | TEMPERATURE: 96.5 F | HEIGHT: 70 IN | HEART RATE: 65 BPM | RESPIRATION RATE: 16 BRPM | BODY MASS INDEX: 38.08 KG/M2

## 2017-07-11 DIAGNOSIS — I25.10 CORONARY ARTERY DISEASE INVOLVING NATIVE CORONARY ARTERY OF NATIVE HEART WITHOUT ANGINA PECTORIS: ICD-10-CM

## 2017-07-11 DIAGNOSIS — I63.341 CEREBRAL INFARCTION DUE TO THROMBOSIS OF RIGHT CEREBELLAR ARTERY (HCC): ICD-10-CM

## 2017-07-11 DIAGNOSIS — R39.198 DIFFICULTY IN URINATION: Primary | ICD-10-CM

## 2017-07-11 DIAGNOSIS — N40.1 BENIGN NON-NODULAR PROSTATIC HYPERPLASIA WITH LOWER URINARY TRACT SYMPTOMS: ICD-10-CM

## 2017-07-11 LAB
BILIRUB UR QL STRIP: NEGATIVE
GLUCOSE UR-MCNC: NEGATIVE MG/DL
KETONES P FAST UR STRIP-MCNC: NEGATIVE MG/DL
PH UR STRIP: 6 [PH] (ref 4.6–8)
PROT UR QL STRIP: NEGATIVE MG/DL
SP GR UR STRIP: 1.01 (ref 1–1.03)
UA UROBILINOGEN AMB POC: NORMAL (ref 0.2–1)
URINALYSIS CLARITY POC: CLEAR
URINALYSIS COLOR POC: YELLOW
URINE BLOOD POC: NEGATIVE
URINE LEUKOCYTES POC: NEGATIVE
URINE NITRITES POC: NEGATIVE

## 2017-07-11 RX ORDER — FINASTERIDE 1 MG/1
1 TABLET, FILM COATED ORAL DAILY
Qty: 30 TAB | Refills: 5 | Status: SHIPPED | OUTPATIENT
Start: 2017-07-11 | End: 2017-10-09 | Stop reason: ALTCHOICE

## 2017-07-11 RX ORDER — BUTALBITAL, ACETAMINOPHEN, CAFFEINE AND CODEINE PHOSPHATE 50; 325; 40; 30 MG/1; MG/1; MG/1; MG/1
CAPSULE ORAL
COMMUNITY
End: 2018-01-09 | Stop reason: ALTCHOICE

## 2017-07-11 RX ORDER — GABAPENTIN 300 MG/1
300 CAPSULE ORAL 2 TIMES DAILY
COMMUNITY

## 2017-07-11 NOTE — PROGRESS NOTES
Subjective:     Dwayne Esparza is a 48 y.o. male who presents for follow up of hypertension and history of prior stroke. New concerns: difficulty with urination. Weak stream at times. Feels has to push to pee. Sx x weeks/ mo, no dysuria occa can't fully empty bladder. Saw neurologist for HAs put on meds min usage in fioricet with codiene. No new weakness. Current Outpatient Prescriptions   Medication Sig Dispense Refill    gabapentin (NEURONTIN) 300 mg capsule Take 300 mg by mouth two (2) times a day.  codeine-butalbital-acetaminophen-caffeine (FIORICET WITH CODEINE) -81-30 mg per capsule Take  by mouth every four (4) hours as needed for Headache.  topiramate (TOPAMAX) 50 mg tablet TAKE ONE TABLET BY MOUTH TWICE DAILY 180 Tab 1    isosorbide mononitrate ER (IMDUR) 60 mg CR tablet TAKE ONE TABLET BY MOUTH IN THE MORNING 90 Tab 1    Ranolazine (RANEXA) 1,000 mg Tb12 Take 1 Tab by mouth two (2) times a day. 60 Tab 5    aspirin delayed-release 81 mg tablet Take 1 Tab by mouth daily. 90 Tab 3    pantoprazole (PROTONIX) 40 mg tablet TAKE ONE TABLET BY MOUTH TWICE DAILY 30 Tab 11    metoprolol tartrate (LOPRESSOR) 25 mg tablet Take 0.5 Tabs by mouth nightly. 45 Tab 3    citalopram (CELEXA) 20 mg tablet TAKE ONE & ONE-HALF TABLETS BY MOUTH ONCE DAILY 50 Tab 11    clopidogrel (PLAVIX) 75 mg tablet Take 1 Tab by mouth daily. 30 Tab 11    evolocumab (REPATHA SURECLICK) pen injection by SubCUTAneous route.  omega-3 fatty acids-vitamin e (FISH OIL) 1,000 mg cap Take 1 Cap by mouth.  fexofenadine (ALLEGRA) 180 mg tablet Take  by mouth daily.  SUMAtriptan (IMITREX) 100 mg tablet Take 1 Tab by mouth once as needed for Migraine.  10 Tab 5     Allergies   Allergen Reactions    Crestor [Rosuvastatin] Rash    Iodinated Contrast- Oral And Iv Dye Hives    Statins-Hmg-Coa Reductase Inhibitors Myalgia       Diet and Lifestyle: generally follows a low sodium diet, nonsmoker    Cardiovascular ROS: taking medications as instructed, no medication side effects noted, no TIA's, no chest pain on exertion, no dyspnea on exertion, no swelling of ankles, no orthostatic dizziness or lightheadedness. Review of Systems, additional:  Pertinent items are noted in HPI. Social History   Substance Use Topics    Smoking status: Former Smoker     Packs/day: 0.30     Years: 30.00     Types: Cigarettes     Quit date: 9/19/2011    Smokeless tobacco: Never Used    Alcohol use No        Lab Results  Component Value Date/Time   WBC 7.4 04/26/2017 05:13 AM   HGB 13.7 04/26/2017 05:13 AM   HCT 38.0 04/26/2017 05:13 AM   PLATELET 544 25/07/3901 05:13 AM   MCV 96.7 04/26/2017 05:13 AM     Lab Results  Component Value Date/Time   ALT (SGPT) 36 04/24/2017 08:30 PM   AST (SGOT) 24 04/24/2017 08:30 PM   Alk.  phosphatase 65 04/24/2017 08:30 PM   Bilirubin, total 0.5 04/24/2017 08:30 PM   Albumin 4.0 04/24/2017 08:30 PM   Protein, total 8.0 04/24/2017 08:30 PM   INR 1.1 04/25/2017 07:40 PM   Prothrombin time 13.5 04/25/2017 07:40 PM   PLATELET 845 26/82/7267 05:13 AM       Lab Results  Component Value Date/Time   GFR est non-AA >60 04/26/2017 05:13 AM   GFR est AA >60 04/26/2017 05:13 AM   Creatinine 1.16 04/26/2017 05:13 AM   BUN 8 04/26/2017 05:13 AM   Sodium 142 04/26/2017 05:13 AM   Potassium 3.5 04/26/2017 05:13 AM   Chloride 107 04/26/2017 05:13 AM   CO2 24 04/26/2017 05:13 AM   Magnesium 1.8 04/26/2017 05:13 AM     Lab Results   Component Value Date/Time    Glucose 104 04/26/2017 05:13 AM    Glucose (POC) 132 04/25/2017 12:21 AM    Glucose (POC) 119 07/13/2012 08:15 AM                       Objective:     Physical exam significant for the following:   Visit Vitals    BP 90/60 (BP 1 Location: Left arm, BP Patient Position: Sitting)    Pulse 65    Temp 96.5 °F (35.8 °C) (Oral)    Resp 16    Ht 5' 10\" (1.778 m)    Wt 266 lb (120.7 kg)    SpO2 96%    BMI 38.17 kg/m2     Appearance: alert, well appearing, and in no distress. General exam: CVS exam BP noted to be low today in office, S1, S2 normal, no gallop, no murmur, chest clear, no JVD, no HSM, no edema. . Prostate exam enlarged prostate, no nodules. Left sided hemiparesis unchanged. Assessment/Plan:     hypertension stable      ICD-10-CM ICD-9-CM    1. Difficulty in urination R39.198 788.99 AMB POC URINALYSIS DIP STICK AUTO W/O MICRO   2. Benign non-nodular prostatic hyperplasia with lower urinary tract symptoms N40.1 600.91    3. 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 Coronary artery disease involving native coronary artery of native heart without angina pectoris I25.10 414.01      . Symptoms consistent with BPH. Orders Placed This Encounter    AMB POC URINALYSIS DIP STICK AUTO W/O MICRO    gabapentin (NEURONTIN) 300 mg capsule     Sig: Take 300 mg by mouth two (2) times a day.  codeine-butalbital-acetaminophen-caffeine (FIORICET WITH CODEINE) -68-30 mg per capsule     Sig: Take  by mouth every four (4) hours as needed for Headache.  finasteride (PROPECIA) 1 mg tablet     Sig: Take 1 Tab by mouth daily. Dispense:  30 Tab     Refill:  5     Discussed possible side affects, precautions, and drug interactions and possible benefits of the medication(s). Told will take a while to work but med should help  HTN and CVA stable. BP low.

## 2017-07-11 NOTE — MR AVS SNAPSHOT
Visit Information Date & Time Provider Department Dept. Phone Encounter #  
 7/11/2017  2:45 PM Cornelius Cruz  Azeem Peterson [id number] Follow-up Instructions Return in about 6 weeks (around 8/22/2017) for routine follow up. Your Appointments 8/1/2017  9:00 AM  
6 MONTH with Colton Smith MD  
[facility] [address] [place] [address]) Appt Note: Per  C $0CP REM  
 [address]  
[phone] [address] [place] [address] Upcoming Health Maintenance Date Due INFLUENZA AGE 9 TO ADULT 8/1/2017 DTaP/Tdap/Td series (2 - Td) 11/30/2025 Allergies as of 7/11/2017  Review Complete On: 7/11/2017 By: Christina Weston LPN Severity Noted Reaction Type Reactions Crestor [Rosuvastatin]  12/11/2014    Rash Iodinated Contrast- Oral And Iv Dye  02/26/2013    Hives Statins-hmg-coa Reductase Inhibitors  07/26/2016    Myalgia Current Immunizations  Reviewed on 9/12/2016 Name Date Influenza Vaccine (Quad) PF 9/12/2016, 11/30/2015 Tdap 11/30/2015 Not reviewed this visit You Were Diagnosed With   
  
 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  
 38.17 kg/m 2 2.44 m 2 Preferred Pharmacy Pharmacy Name Phone [facility] Adriane Bravo [facility] [phone] Your Updated Medication List  
  
   
This list is accurate as of: 7/11/17  3:54 PM.  Always use your most recent med list. ALLEGRA 180 mg tablet Generic drug:  fexofenadine Take  by mouth daily. aspirin delayed-release 81 mg tablet Take 1 Tab by mouth daily. citalopram 20 mg tablet Commonly known as:  CELEXA  
TAKE ONE & ONE-HALF TABLETS BY MOUTH ONCE DAILY  
  
 clopidogrel 75 mg Tab Commonly known as:  PLAVIX Take 1 Tab by mouth daily. codeine-butalbital-acetaminophen-caffeine -98-30 mg per capsule Commonly known as:  FIORICET WITH CODEINE Take  by mouth every four (4) hours as needed for Headache.  
  
 evolocumab pen injection Commonly known as:  Jinx Beagle  
by SubCUTAneous route. finasteride 1 mg tablet Commonly known as:  Reino Piano Take 1 Tab by mouth daily. FISH OIL 1,000 mg Cap Generic drug:  omega-3 fatty acids-vitamin e Take 1 Cap by mouth.  
  
 gabapentin 300 mg capsule Commonly known as:  NEURONTIN Take 300 mg by mouth two (2) times a day. isosorbide mononitrate ER 60 mg CR tablet Commonly known as:  IMDUR  
TAKE ONE TABLET BY MOUTH IN THE MORNING  
  
 metoprolol tartrate 25 mg tablet Commonly known as:  LOPRESSOR Take 0.5 Tabs by mouth nightly. pantoprazole 40 mg tablet Commonly known as:  PROTONIX  
TAKE ONE TABLET BY MOUTH TWICE DAILY  
  
 ranolazine ER 1,000 mg  
Commonly known as:  RANEXA Take 1 Tab by mouth two (2) times a day. SUMAtriptan 100 mg tablet Commonly known as:  IMITREX Take 1 Tab by mouth once as needed for Migraine. topiramate 50 mg tablet Commonly known as:  TOPAMAX TAKE ONE TABLET BY MOUTH TWICE DAILY Prescriptions Sent to Pharmacy Refills  
 finasteride (PROPECIA) 1 mg tablet 5 Sig: Take 1 Tab by mouth daily. Class: Normal  
 Pharmacy: [facility] Eliseo Ma, Mehreen Nair Ph #: [phone] Route: Oral  
  
We Performed the Following AMB POC URINALYSIS DIP STICK AUTO W/O MICRO [62069 CPT(R)] Follow-up Instructions Return in about 6 weeks (around 8/22/2017) for routine follow up. Patient Instructions Benign Prostatic Hyperplasia: Care Instructions Your Care Instructions Benign prostatic hyperplasia, or BPH, is an enlarged prostate gland. The prostate is a small gland that makes some of the fluid in semen. Prostate enlargement happens to almost all men as they age. It is usually not serious. BPH does not cause prostate cancer. As the prostate gets bigger, it may partly block the flow of urine. You may have a hard time getting a urine stream started or completely stopped. BPH can cause dribbling. You may have a weak urine stream, or you may have to urinate more often than you used to, especially at night. Most men find these problems easy to manage. You do not need treatment unless your symptoms bother you a lot or you have other problems, such as bladder infections or stones. In these cases, medicines may help. Surgery is not needed unless the urine flow is blocked or the symptoms do not get better with medicine. Follow-up care is a key part of your treatment and safety. Be sure to make and go to all appointments, and call your doctor if you are having problems. It's also a good idea to know your test results and keep a list of the medicines you take. How can you care for yourself at home? · Take plenty of time to urinate. Try to relax. · Try \"double voiding. \" Urinate as much you can, relax for a few moments, and then try to urinate again. · Sit on the toilet to urinate. · Read or think of other things while you are waiting. · Turn on a faucet, or try to picture running water. Some men find that this helps get their urine flowing. · If dribbling is a problem, wash your penis daily to avoid skin irritation and infection. · Avoid caffeine and alcohol. These drinks will increase how often you need to urinate. Spread your fluid intake throughout the day. If the urge to urinate often wakes you at night, limit your fluid intake in the evening. Urinate right before you go to bed. · Many over-the-counter cold and allergy medicines can make the symptoms of BPH worse. Avoid antihistamines, decongestants, and allergy pills, if you can. Read the warnings on the package. · If you take any prescription medicines, especially tranquilizers or antidepressants, ask your doctor or pharmacist whether they can cause urination problems. There may be other medicines you can use that do not cause urinary problems. · Be safe with medicines. Take your medicines exactly as prescribed. Call your doctor if you think you are having a problem with your medicine. When should you call for help? Call your doctor now or seek immediate medical care if: 
· You cannot urinate at all. · You have symptoms of a urinary infection. For example: ¨ You have blood or pus in your urine. ¨ You have pain in your back just below your rib cage. This is called flank pain. ¨ You have a fever, chills, or body aches. ¨ It hurts to urinate. ¨ You have groin or belly pain. Watch closely for changes in your health, and be sure to contact your doctor if: 
· It hurts when you ejaculate. · Your urinary problems get a lot worse or bother you a lot. Where can you learn more? Go to http://etta-fer.info/. Enter Y854 in the search box to learn more about \"Benign Prostatic Hyperplasia: Care Instructions. \" Current as of: March 14, 2017 Content Version: 11.3 © 7669-5152 [employer].  Care instructions adapted under license by [address] (which disclaims liability or warranty for this information). If you have questions about a medical condition or this instruction, always ask your healthcare professional. [address] any warranty or liability for your use of this information. Introducing \[facility]\"" & HEALTH SERVICES! Dear Isaiah Wilder: Thank you for requesting a Capiota account. Our records indicate that you already have an active Capiota account. You can access your account anytime at [email or web address] Medic Trace/Spartan Race Did you know that you can access your hospital and ER discharge instructions at any time in Capiota? You can also review all of your test results from your hospital stay or ER visit. Additional Information If you have questions, please visit the Frequently Asked Questions section of the Capiota website at [email or web address] Remember, Capiota is NOT to be used for urgent needs. For medical emergencies, dial 911. Now available from your iPhone and Android! Please provide this summary of care documentation to your next provider. Your primary care clinician is listed as Darrell Holcomb. If you have any questions after today's visit, please call [phone].

## 2017-07-11 NOTE — PROGRESS NOTES
No chief complaint on file. I have reviewed the patient's medical history in detail and updated the computerized patient record. Health Maintenance reviewed. 1. Have you been to the ER, urgent care clinic since your last visit? Hospitalized since your last visit?no    2. Have you seen or consulted any other health care providers outside of the [address] [place] [address] since your last visit? Include any pap smears or colon screening. No    Do you have an [address] in place in the event that you have a healthcare crisis that could impact your decision making as it pertains to your health?no  Would you like information about the [address] [place] [address] given. no

## 2017-07-11 NOTE — PATIENT INSTRUCTIONS

## 2017-08-01 ENCOUNTER — TELEPHONE (OUTPATIENT)
Dept: INTERNAL MEDICINE CLINIC | Age: 47
End: 2017-08-01

## 2017-08-01 ENCOUNTER — DOCUMENTATION ONLY (OUTPATIENT)
Dept: INTERNAL MEDICINE CLINIC | Age: 47
End: 2017-08-01

## 2017-08-01 ENCOUNTER — OFFICE VISIT (OUTPATIENT)
Dept: CARDIOLOGY CLINIC | Age: 47
End: 2017-08-01

## 2017-08-01 VITALS
BODY MASS INDEX: 38.15 KG/M2 | WEIGHT: 266.5 LBS | SYSTOLIC BLOOD PRESSURE: 108 MMHG | DIASTOLIC BLOOD PRESSURE: 78 MMHG | HEART RATE: 69 BPM | RESPIRATION RATE: 16 BRPM | HEIGHT: 70 IN | OXYGEN SATURATION: 99 %

## 2017-08-01 DIAGNOSIS — I63.341 CEREBRAL INFARCTION DUE TO THROMBOSIS OF RIGHT CEREBELLAR ARTERY (HCC): ICD-10-CM

## 2017-08-01 DIAGNOSIS — E78.2 MIXED HYPERLIPIDEMIA: Primary | ICD-10-CM

## 2017-08-01 DIAGNOSIS — Z98.61 S/P PTCA (PERCUTANEOUS TRANSLUMINAL CORONARY ANGIOPLASTY): ICD-10-CM

## 2017-08-01 DIAGNOSIS — I25.10 CORONARY ARTERY DISEASE INVOLVING NATIVE CORONARY ARTERY OF NATIVE HEART WITHOUT ANGINA PECTORIS: ICD-10-CM

## 2017-08-01 NOTE — PROGRESS NOTES
Chief Complaint   Patient presents with    Cholesterol Problem     6 month follow up, need labs for Repatha

## 2017-08-01 NOTE — PROGRESS NOTES
8/1/2017 9:31 AM      Subjective:     Lauren Marte is here for f/u visit. He denies chest pain, chest pressure/discomfort, dyspnea, palpitations, irregular heart beats, near-syncope, syncope, fatigue, orthopnea, paroxysmal nocturnal dyspnea, exertional chest pressure/discomfort, claudication, lower extremity edema. Visit Vitals    /78 (BP 1 Location: Right arm, BP Patient Position: Sitting)    Pulse 69    Resp 16    Ht 5' 10\" (1.778 m)    Wt 266 lb 8 oz (120.9 kg)    SpO2 99%    BMI 38.24 kg/m2     Current Outpatient Prescriptions   Medication Sig    gabapentin (NEURONTIN) 300 mg capsule Take 300 mg by mouth two (2) times a day.  codeine-butalbital-acetaminophen-caffeine (FIORICET WITH CODEINE) -04-30 mg per capsule Take  by mouth every four (4) hours as needed for Headache.  topiramate (TOPAMAX) 50 mg tablet TAKE ONE TABLET BY MOUTH TWICE DAILY    isosorbide mononitrate ER (IMDUR) 60 mg CR tablet TAKE ONE TABLET BY MOUTH IN THE MORNING    Ranolazine (RANEXA) 1,000 mg Tb12 Take 1 Tab by mouth two (2) times a day.  aspirin delayed-release 81 mg tablet Take 1 Tab by mouth daily.  pantoprazole (PROTONIX) 40 mg tablet TAKE ONE TABLET BY MOUTH TWICE DAILY    metoprolol tartrate (LOPRESSOR) 25 mg tablet Take 0.5 Tabs by mouth nightly.  citalopram (CELEXA) 20 mg tablet TAKE ONE & ONE-HALF TABLETS BY MOUTH ONCE DAILY    clopidogrel (PLAVIX) 75 mg tablet Take 1 Tab by mouth daily.  evolocumab (REPATHA SURECLICK) pen injection by SubCUTAneous route.  omega-3 fatty acids-vitamin e (FISH OIL) 1,000 mg cap Take 1 Cap by mouth.  fexofenadine (ALLEGRA) 180 mg tablet Take  by mouth daily.  finasteride (PROPECIA) 1 mg tablet Take 1 Tab by mouth daily.  SUMAtriptan (IMITREX) 100 mg tablet Take 1 Tab by mouth once as needed for Migraine. No current facility-administered medications for this visit.           Objective:      Visit Vitals    BP 108/78 (BP 1 Location: Right arm, BP Patient Position: Sitting)    Pulse 69    Resp 16    Ht 5' 10\" (1.778 m)    Wt 266 lb 8 oz (120.9 kg)    SpO2 99%    BMI 38.24 kg/m2       Data Review:     EKG: Normal sinus rhythm, IVCD, LAD    Reviewed and/or ordered active problem list, medication list tests    Past Medical History:   Diagnosis Date    CAD (coronary artery disease)     GERD (gastroesophageal reflux disease)     Headache     Hypertension     Stroke ([facility] [address] 2011      Past Surgical History:   Procedure Laterality Date    CARDIAC SURG PROCEDURE UNLIST      HX CORONARY STENT PLACEMENT       Allergies   Allergen Reactions    Crestor [Rosuvastatin] Rash    Iodinated Contrast- Oral And Iv Dye Hives    Statins-Hmg-Coa Reductase Inhibitors Myalgia      Family History   Problem Relation Age of Onset    Heart Disease Mother     Hypertension Mother     Diabetes Father     Heart Disease Father       Social History     Social History    Marital status: SINGLE     Spouse name: N/A    Number of children: 0    Years of education: N/A     Occupational History    disabled Not Employed     Social History Main Topics    Smoking status: Former Smoker     Packs/day: 0.30     Years: 30.00     Types: Cigarettes     Quit date: 9/19/2011    Smokeless tobacco: Never Used    Alcohol use No    Drug use: No    Sexual activity: Not Currently     Other Topics Concern    Not on file     Social History Narrative    Lives on his own friends and relatives look in on him. Review of Systems     General: Not Present- Anorexia, Chills, Dietary Changes, Fatigue, Fever, Medication Changes, Night Sweats, Weight Gain > 10lbs. and Weight Loss > 10lbs. .  Skin: Not Present- Bruising and Excessive Sweating. HEENT: Not Present- Headache, Visual Loss and Vertigo. Respiratory: Not Present- Cough, Decreased Exercise Tolerance, Difficulty Breathing, Snoring and Wheezing.   Cardiovascular: Not Present- Abnormal Blood Pressure, Chest Pain, Claudications, Difficulty Breathing On Exertion, Edema, Fainting / Blacking Out, Irregular Heart Beat, Night Cramps, Orthopnea, Palpitations, Paroxysmal Nocturnal Dyspnea, Rapid Heart Rate, Shortness of Breath and Swelling of Extremities. Gastrointestinal: Not Present- Black, Tarry Stool, Bloody Stool, Diarrhea, Hematemesis, Rectal Bleeding and Vomiting. Musculoskeletal: Not Present- Muscle Pain and Muscle Weakness. Neurological: Not Present- Dizziness. Psychiatric: Not Present- Depression. Endocrine: Not Present- Cold Intolerance, Heat Intolerance and Thyroid Problems. Hematology: Not Present- Abnormal Bleeding, Anemia, Blood Clots and Easy Bruising.       Physical Exam   The physical exam findings are as follows:       General   Mental Status - Alert. General Appearance - Not in acute distress. Chest and Lung Exam   Inspection: Accessory muscles - No use of accessory muscles in breathing. Auscultation:   Breath sounds: - Normal.      Cardiovascular   Inspection: Jugular vein - Bilateral - Inspection Normal.  Palpation/Percussion:   Apical Impulse: - Normal.  Auscultation: Rhythm - Regular. Heart Sounds - S1 WNL and S2 WNL. No S3 or S4. Murmurs & Other Heart Sounds: Auscultation of the heart reveals - No Murmurs. Carotid arteries - No Carotid bruit. Peripheral Vascular   Upper Extremity: Inspection - Bilateral - No Cyanotic nailbeds or Digital clubbing. Lower Extremity:   Palpation: Edema - Bilateral - No edema. Assessment:       ICD-10-CM ICD-9-CM    1. Mixed hyperlipidemia E78.2 272.2 AMB POC EKG ROUTINE W/ 12 LEADS, INTER & REP      LIPID PANEL      METABOLIC PANEL, COMPREHENSIVE   2. Coronary artery disease involving native coronary artery of native heart without angina pectoris I25.10 414.01 LIPID PANEL      METABOLIC PANEL, COMPREHENSIVE   3.  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 S/P PTCA (percutaneous transluminal coronary angioplasty) Z98.61 V45.82 LIPID PANEL      METABOLIC PANEL, COMPREHENSIVE       Plan:     1. CAD: stable. Medical treatment. Normal stress test last Jan.   2. Hyperlipidemia: on repatha now. Due for labs. 3. HTN: stable. 4. CVA: f/u with neurology.

## 2017-08-01 NOTE — TELEPHONE ENCOUNTER
Gavin Beyer, from Dr. Mayi Lawrence office called in reference to needing most recent office notes, labs, and xrays. His appt is at 1pm today.  Please fax to [phone]

## 2017-08-01 NOTE — MR AVS SNAPSHOT
Visit Information Date & Time Provider Department Dept. Phone Encounter #  
 8/1/2017  9:00 AM Mateo Hernandez, [address] [place] [address] [facility] [phone] [id number] Follow-up Instructions Return in about 6 months (around 2/1/2018). Routing History Your Appointments 8/22/2017  2:45 PM  
ROUTINE CARE with Lars Moore MD  
[facility] ([facility]) Appt Note: f/u on medication $0 cp [facility] 7/11/17  
 [address] [place] [address] Road. [address] [facility] [address]  
[phone]  
  
   
 [address] [place] [address] [address] Upcoming Health Maintenance Date Due INFLUENZA AGE 9 TO ADULT 8/1/2017 DTaP/Tdap/Td series (2 - Td) 11/30/2025 Allergies as of 8/1/2017  Review Complete On: 8/1/2017 By: Mateo Hernandez MD  
  
 Severity Noted Reaction Type Reactions Crestor [Rosuvastatin]  12/11/2014    Rash Iodinated Contrast- Oral And Iv Dye  02/26/2013    Hives Statins-hmg-coa Reductase Inhibitors  07/26/2016    Myalgia Current Immunizations  Reviewed on 9/12/2016 Name Date Influenza Vaccine (Quad) PF 9/12/2016, 11/30/2015 Tdap 11/30/2015 Not reviewed this visit You Were Diagnosed With   
  
 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   
 S/P PTCA (percutaneous transluminal coronary angioplasty)     ICD-10-CM: Z98.61 ICD-9-CM: V45.82 Vitals BP Pulse Resp Height(growth percentile) Weight(growth percentile) SpO2  
 108/78 (BP 1 Location: Right arm, BP Patient Position: Sitting) 69 16 5' 10\" (1.778 m) 266 lb 8 oz (120.9 kg) 99% BMI Smoking Status 38.24 kg/m2 Former Smoker Vitals History BMI and BSA Data Body Mass Index Body Surface Area  
 38.24 kg/m 2 2.44 m 2 Preferred Pharmacy Pharmacy Name [facility] Adriane Bravo [phone] Your Updated Medication List  
  
   
This list is accurate as of: 8/1/17  9:33 AM.  Always use your most recent med list. ALLEGRA 180 mg tablet Generic drug:  fexofenadine Take  by mouth daily. aspirin delayed-release 81 mg tablet Take 1 Tab by mouth daily. citalopram 20 mg tablet Commonly known as:  CELEXA  
TAKE ONE & ONE-HALF TABLETS BY MOUTH ONCE DAILY  
  
 clopidogrel 75 mg Tab Commonly known as:  PLAVIX Take 1 Tab by mouth daily. codeine-butalbital-acetaminophen-caffeine -96-30 mg per capsule Commonly known as:  FIORICET WITH CODEINE Take  by mouth every four (4) hours as needed for Headache.  
  
 evolocumab pen injection Commonly known as:  Bertrand Coburn  
by SubCUTAneous route. finasteride 1 mg tablet Commonly known as:  Quenten Purl Take 1 Tab by mouth daily. FISH OIL 1,000 mg Cap Generic drug:  omega-3 fatty acids-vitamin e Take 1 Cap by mouth.  
  
 gabapentin 300 mg capsule Commonly known as:  NEURONTIN Take 300 mg by mouth two (2) times a day. isosorbide mononitrate ER 60 mg CR tablet Commonly known as:  IMDUR  
TAKE ONE TABLET BY MOUTH IN THE MORNING  
  
 metoprolol tartrate 25 mg tablet Commonly known as:  LOPRESSOR Take 0.5 Tabs by mouth nightly. pantoprazole 40 mg tablet Commonly known as:  PROTONIX  
TAKE ONE TABLET BY MOUTH TWICE DAILY  
  
 ranolazine ER 1,000 mg  
Commonly known as:  RANEXA Take 1 Tab by mouth two (2) times a day. SUMAtriptan 100 mg tablet Commonly known as:  IMITREX Take 1 Tab by mouth once as needed for Migraine. topiramate 50 mg tablet Commonly known as:  TOPAMAX TAKE ONE TABLET BY MOUTH TWICE DAILY We Performed the Following AMB POC EKG ROUTINE W/ 12 LEADS, INTER & REP [66356 CPT(R)] LIPID PANEL [88567 CPT(R)] METABOLIC PANEL, COMPREHENSIVE [05558 CPT(R)] Follow-up Instructions Return in about 6 months (around 2/1/2018). Introducing [facility]! Dear Vick Flores: Thank you for requesting a [employer] account. Our records indicate that you already have an active [employer] account. You can access your account anytime at [email or web address] Did you know that you can access your hospital and ER discharge instructions at any time in [employer]? You can also review all of your test results from your hospital stay or ER visit. Additional Information If you have questions, please visit the Frequently Asked Questions section of the [employer] website at [email or web address] Remember, [employer] is NOT to be used for urgent needs. For medical emergencies, dial 911. Now available from your iPhone and Android! Please provide this summary of care documentation to your next provider. Your primary care clinician is listed as Shandra Polk. If you have any questions after today's visit, please call [phone].

## 2017-08-01 NOTE — PROGRESS NOTES
PA request submitted thru covermymeds,for Finasteride. Kayleen Ramesh does not cover this medication.

## 2017-08-02 LAB
ALBUMIN SERPL-MCNC: 4.3 G/DL (ref 3.5–5.5)
ALBUMIN/GLOB SERPL: 1.8 {RATIO} (ref 1.2–2.2)
ALP SERPL-CCNC: 57 IU/L (ref 39–117)
ALT SERPL-CCNC: 19 IU/L (ref 0–44)
AST SERPL-CCNC: 23 IU/L (ref 0–40)
BILIRUB SERPL-MCNC: 0.3 MG/DL (ref 0–1.2)
BUN SERPL-MCNC: 15 MG/DL (ref 6–24)
BUN/CREAT SERPL: 14 (ref 9–20)
CALCIUM SERPL-MCNC: 9 MG/DL (ref 8.7–10.2)
CHLORIDE SERPL-SCNC: 105 MMOL/L (ref 96–106)
CHOLEST SERPL-MCNC: 148 MG/DL (ref 100–199)
CO2 SERPL-SCNC: 21 MMOL/L (ref 18–29)
CREAT SERPL-MCNC: 1.06 MG/DL (ref 0.76–1.27)
GLOBULIN SER CALC-MCNC: 2.4 G/DL (ref 1.5–4.5)
GLUCOSE SERPL-MCNC: 98 MG/DL (ref 65–99)
HDLC SERPL-MCNC: 35 MG/DL
INTERPRETATION, 910389: NORMAL
LDLC SERPL CALC-MCNC: 54 MG/DL (ref 0–99)
POTASSIUM SERPL-SCNC: 4.3 MMOL/L (ref 3.5–5.2)
PROT SERPL-MCNC: 6.7 G/DL (ref 6–8.5)
SODIUM SERPL-SCNC: 144 MMOL/L (ref 134–144)
TRIGL SERPL-MCNC: 296 MG/DL (ref 0–149)
VLDLC SERPL CALC-MCNC: 59 MG/DL (ref 5–40)

## 2017-08-04 ENCOUNTER — TELEPHONE (OUTPATIENT)
Dept: CARDIOLOGY CLINIC | Age: 47
End: 2017-08-04

## 2017-08-04 NOTE — TELEPHONE ENCOUNTER
----- Message from Musa Mariscal MD sent at 8/3/2017  5:09 AM EDT -----  Inform him labs are ok. TG continues to improve. LDL at target. Called Gwendolyn Wan on hippa form and left message to return my call. Halina Atul on hippa form, returned my call, relayed message of pt's labs. Read out values to her. She is going to call repatha and see if pt needs refill or needs authorization. I advised her to leave a message on my voice mail-as this is a busy day for us. She verbalized that she understood everything. Halina Pollard on hippa form, advised that she called [address] for pt's Bronson Franc and he has no refills. Advised her I would call and see if they can send me paper for refills for NP to sign. She advised that she would check on Tuesday next week to make sure they received it. She verbalized that she understood everything. [address] and gave verbal order for Repatha refill. Gave verbal on repatha 129 mg sure click pen every 2 weeks box of 2 refill 6 times He verbalized everything back to me and verbalized that he understood everything and would send that in.

## 2017-08-30 RX ORDER — CLOPIDOGREL BISULFATE 75 MG/1
TABLET ORAL
Qty: 90 TAB | Refills: 1 | Status: SHIPPED | OUTPATIENT
Start: 2017-08-30 | End: 2018-02-28 | Stop reason: SDUPTHER

## 2017-08-30 RX ORDER — CITALOPRAM 20 MG/1
TABLET, FILM COATED ORAL
Qty: 45 TAB | Refills: 5 | Status: SHIPPED | OUTPATIENT
Start: 2017-08-30 | End: 2018-02-28 | Stop reason: SDUPTHER

## 2017-08-30 NOTE — TELEPHONE ENCOUNTER
Requested Prescriptions     Pending Prescriptions Disp Refills    clopidogrel (PLAVIX) 75 mg tab [Pharmacy Med Name: CLOPIDOGREL 75MG    TAB] 90 Tab 1     Sig: TAKE ONE TABLET BY MOUTH ONCE DAILY     Last office visit 7/11/17 FUTURE 9/26/17  Last filled  8/1/16  Changes made to medication on last visit none

## 2017-08-30 NOTE — TELEPHONE ENCOUNTER
Last office visit:  8/1/17  Last filled:  8/1/16 #50 X 11 refills (Citalopram)  No changes  Follow up in 3 weeks with Dr Braxton Sosa

## 2017-09-15 ENCOUNTER — TELEPHONE (OUTPATIENT)
Dept: CARDIOLOGY CLINIC | Age: 47
End: 2017-09-15

## 2017-09-15 NOTE — TELEPHONE ENCOUNTER
Called Repatha ready to see if they have started the prior auth process. The  put in for the process. They will be reaching out to our office for any notes,insurance, labs needed.

## 2017-09-19 NOTE — TELEPHONE ENCOUNTER
Fermin El Castillo from [facility] [address] [facility] called to see if we had done prior auth on pt's repatha. I advised her that someone was suppose to fax me a prior auth form but I have not seen it yet. She advised she would contact them and have them fax it to me.

## 2017-09-21 NOTE — TELEPHONE ENCOUNTER
Called Fatimah on hippa form, advised that I had tried to start the prior auth process but have not received the paper work from [employer]. Advised her that I had called twice. Advised her that I would be out of the office on tomorrow but would be back on Monday and could reach out to her then. Advised to call our office. Returned [employer] call and left message on phone stating that I had received her message and had not received the prior auth form for [employer] yet. I advised her I would reach out to them this afternoon and let her know what happens. SEE OTHER REPATHA PRIOR AUTH FOR PT. PT WAS APPROVED UNTIL 9/11/18. PT WAS NOTIFIED.

## 2017-09-25 ENCOUNTER — TELEPHONE (OUTPATIENT)
Dept: CARDIOLOGY CLINIC | Age: 47
End: 2017-09-25

## 2017-09-25 NOTE — TELEPHONE ENCOUNTER
Called and left message for Repatha rep to return my call. Called Magdalena from [employer], advised that I have not been sent any prior auth forms from [address] [place] [address] or [employer] for pt 's Repatha authorization. She advised that she would fax me the form to fill out and fax back. Verified our fax number and said she would get that to me. Received cover my meds. Processed it on line thru cover my meds but it stated that it does not manage a PA for this pt. Called [employer] Yun but got transferred to [facility] and she ran it through and stated that we can not do cover my meds with this pt's plan. She gave the #[phone] to call. Called [employer] and spoke to Kathleen samano, she advised that she would get the correct form for pt's prior auth faxed to me. Received prior auth on Repatha. Filled out prior auth form and faxed to [facility] @ [phone] and received fax confirmation. RECEIVED FAX STATING THAT PT'S REPATHA HAS BEEN APPROVED FOR 9/26/17-9/11/18. Called [employer] to verify that pt 's Rx for [address] [place] [address] has been sent and approved. She looked and the pt's [address] [place] [address] Avenue did go thru and the pt will be contacted for shipment. Called Fatimah on hippa form, advised that pt's [address] [place] [address] Avenue has been approved and Ian Ernst will contact pt for shipment date. She stated that she will call [employer] and set up shipment since she is home. Advised her that the authorization is good until 9/11/18. She verbalized that she understood everything.

## 2017-09-25 NOTE — TELEPHONE ENCOUNTER
Pharmacy called looking for prior autho for [address] closing today insurance plan number [phone] to obtain autho,Thanks

## 2017-09-29 RX ORDER — METOPROLOL TARTRATE 25 MG/1
TABLET, FILM COATED ORAL
Qty: 90 TAB | Refills: 1 | Status: SHIPPED | OUTPATIENT
Start: 2017-09-29 | End: 2017-10-09 | Stop reason: SDUPTHER

## 2017-09-29 NOTE — TELEPHONE ENCOUNTER
Requested Prescriptions     Pending Prescriptions Disp Refills    metoprolol tartrate (LOPRESSOR) 25 mg tablet [Pharmacy Med Name: Marleen Lira TAR 25MG   TAB] 90 Tab 1     Sig: TAKE ONE-HALF TABLET BY MOUTH TWICE DAILY     Last office visit 7/11/17 future 10/9/17  Last filled 12/20/16  Changes made to medication on last visit  none

## 2017-10-09 ENCOUNTER — OFFICE VISIT (OUTPATIENT)
Dept: INTERNAL MEDICINE CLINIC | Age: 47
End: 2017-10-09

## 2017-10-09 VITALS
DIASTOLIC BLOOD PRESSURE: 62 MMHG | HEART RATE: 67 BPM | BODY MASS INDEX: 38.51 KG/M2 | TEMPERATURE: 97.3 F | HEIGHT: 70 IN | RESPIRATION RATE: 16 BRPM | SYSTOLIC BLOOD PRESSURE: 99 MMHG | WEIGHT: 269 LBS | OXYGEN SATURATION: 99 %

## 2017-10-09 DIAGNOSIS — I25.10 CORONARY ARTERY DISEASE INVOLVING NATIVE CORONARY ARTERY OF NATIVE HEART WITHOUT ANGINA PECTORIS: ICD-10-CM

## 2017-10-09 DIAGNOSIS — I69.354 HEMIPARESIS AFFECTING LEFT SIDE AS LATE EFFECT OF CEREBROVASCULAR ACCIDENT (CVA) (HCC): ICD-10-CM

## 2017-10-09 DIAGNOSIS — Z23 ENCOUNTER FOR IMMUNIZATION: ICD-10-CM

## 2017-10-09 DIAGNOSIS — G44.019 EPISODIC CLUSTER HEADACHE, NOT INTRACTABLE: ICD-10-CM

## 2017-10-09 DIAGNOSIS — E66.9 OBESITY (BMI 30-39.9): ICD-10-CM

## 2017-10-09 DIAGNOSIS — I63.341 CEREBRAL INFARCTION DUE TO THROMBOSIS OF RIGHT CEREBELLAR ARTERY (HCC): Primary | ICD-10-CM

## 2017-10-09 PROBLEM — I63.9 ACUTE CVA (CEREBROVASCULAR ACCIDENT) (HCC): Status: RESOLVED | Noted: 2017-04-24 | Resolved: 2017-10-09

## 2017-10-09 RX ORDER — ISOSORBIDE MONONITRATE 60 MG/1
TABLET, EXTENDED RELEASE ORAL
Qty: 90 TAB | Refills: 3 | Status: SHIPPED | OUTPATIENT
Start: 2017-10-09 | End: 2018-01-09 | Stop reason: SDUPTHER

## 2017-10-09 RX ORDER — PANTOPRAZOLE SODIUM 40 MG/1
40 TABLET, DELAYED RELEASE ORAL DAILY
Qty: 90 TAB | Refills: 3 | Status: SHIPPED | OUTPATIENT
Start: 2017-10-09 | End: 2018-10-01 | Stop reason: SDUPTHER

## 2017-10-09 RX ORDER — RANOLAZINE 1000 MG/1
1000 TABLET, EXTENDED RELEASE ORAL 2 TIMES DAILY
Qty: 180 TAB | Refills: 3 | Status: SHIPPED | OUTPATIENT
Start: 2017-10-09 | End: 2018-10-01 | Stop reason: SDUPTHER

## 2017-10-09 RX ORDER — METOPROLOL TARTRATE 25 MG/1
12.5 TABLET, FILM COATED ORAL
Qty: 45 TAB | Refills: 3 | Status: SHIPPED | OUTPATIENT
Start: 2017-10-09 | End: 2018-09-28 | Stop reason: SDUPTHER

## 2017-10-09 RX ORDER — TOPIRAMATE 50 MG/1
TABLET, FILM COATED ORAL
Qty: 180 TAB | Refills: 3 | Status: SHIPPED | OUTPATIENT
Start: 2017-10-09 | End: 2018-10-01 | Stop reason: SDUPTHER

## 2017-10-09 NOTE — MR AVS SNAPSHOT
Visit Information Date & Time Provider Department Dept. Phone Encounter #  
 10/9/2017  2:45 PM MD [address] [id number] Follow-up Instructions Return in about 3 months (around 1/9/2018) for routine follow up. Your Appointments 2/6/2018  9:00 AM  
6 MONTH with Logan Berry MD  
[facility] [address] [place] [address]) Appt Note: $0cp  ekr [address]  
[phone] [address] Upcoming Health Maintenance Date Due INFLUENZA AGE 9 TO ADULT 8/1/2017 DTaP/Tdap/Td series (2 - Td) 11/30/2025 Allergies as of 10/9/2017  Review Complete On: 10/9/2017 By: Jordy Worrell MD  
  
 Severity Noted Reaction Type Reactions Crestor [Rosuvastatin]  12/11/2014    Rash Iodinated Contrast- Oral And Iv Dye  02/26/2013    Hives Statins-hmg-coa Reductase Inhibitors  07/26/2016    Myalgia Current Immunizations  Reviewed on 10/9/2017 Name Date Influenza Vaccine (Quad) PF  Incomplete, 9/12/2016, 11/30/2015 Tdap 11/30/2015 Reviewed by Jordy Worrell MD on 10/9/2017 at  3:05 PM  
You Were Diagnosed With   
  
 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 
ICD-9-CM: 339.01 Obesity (BMI 30-39. 9)     ICD-10-CM: E66.9 ICD-9-CM: 278.00 Vitals BP Pulse Temp Resp Height(growth percentile) Weight(growth percentile) 99/62 (BP 1 Location: Right arm, BP Patient Position: At rest) 67 97.3 °F (36.3 °C) (Oral) 16 5' 10\" (1.778 m) 269 lb (122 kg) SpO2 BMI Smoking Status 99% 38.6 kg/m2 Former Smoker BMI and BSA Data Body Mass Index Body Surface Area  
 38.6 kg/m 2 2.45 m 2 Preferred Pharmacy Pharmacy Name Phone [facility] Ramin Ac[facility] [phone] Your Updated Medication List  
  
   
This list is accurate as of: 10/9/17  3:29 PM.  Always use your most recent med list. ALLEGRA 180 mg tablet Generic drug:  fexofenadine Take  by mouth daily. aspirin delayed-release 81 mg tablet Take 1 Tab by mouth daily. citalopram 20 mg tablet Commonly known as:  CELEXA  
TAKE ONE & ONE-HALF TABLETS BY MOUTH ONCE DAILY  
  
 clopidogrel 75 mg Tab Commonly known as:  PLAVIX TAKE ONE TABLET BY MOUTH ONCE DAILY  
  
 codeine-butalbital-acetaminophen-caffeine -09-30 mg per capsule Commonly known as:  FIORICET WITH CODEINE Take  by mouth every four (4) hours as needed for Headache.  
  
 evolocumab pen injection Commonly known as:  REPATHA SURECLICK  
271 mg by SubCUTAneous route every fourteen (14) days. FISH OIL 1,000 mg Cap Generic drug:  omega-3 fatty acids-vitamin e Take 1 Cap by mouth.  
  
 gabapentin 300 mg capsule Commonly known as:  NEURONTIN Take 300 mg by mouth two (2) times a day. isosorbide mononitrate ER 60 mg CR tablet Commonly known as:  IMDUR  
TAKE ONE TABLET BY MOUTH IN THE MORNING  
  
 metoprolol tartrate 25 mg tablet Commonly known as:  LOPRESSOR Take 0.5 Tabs by mouth nightly. MYRBETRIQ 50 mg ER tablet Generic drug:  mirabegron ER Take 50 mg by mouth daily. pantoprazole 40 mg tablet Commonly known as:  PROTONIX Take 1 Tab by mouth daily. ranolazine ER 1,000 mg  
Commonly known as:  RANEXA Take 1 Tab by mouth two (2) times a day. topiramate 50 mg tablet Commonly known as:  TOPAMAX TAKE ONE TABLET BY MOUTH TWICE DAILY Prescriptions Sent to Pharmacy Refills  
 metoprolol tartrate (LOPRESSOR) 25 mg tablet 3 Sig: Take 0.5 Tabs by mouth nightly. Class: Normal  
 Pharmacy: [address] [place] [address]. Rd.,[address] [place] [address] Carley Nair Ph #: [phone] Route: Oral  
 pantoprazole (PROTONIX) 40 mg tablet 3 Sig: Take 1 Tab by mouth daily. Class: Normal  
 Pharmacy: [address] [place] [address]. Rd.,[address] [place] [address] Carley Nair Ph #: [phone] Route: Oral  
 ranolazine ER (RANEXA) 1,000 mg 3 Sig: Take 1 Tab by mouth two (2) times a day. Class: Normal  
 Pharmacy: [address] [place] [address]. Rd.,[address] [place] [address] Carley Nair Ph #: [phone] Route: Oral  
 isosorbide mononitrate ER (IMDUR) 60 mg CR tablet 3 Sig: TAKE ONE TABLET BY MOUTH IN THE MORNING Class: Normal  
 Pharmacy: [address] [place] [address]. Rd.,[address] [place] [address] Adriane Dove [facility] Ph #: [phone]  
 topiramate (TOPAMAX) 50 mg tablet 3 Sig: TAKE ONE TABLET BY MOUTH TWICE DAILY Class: Normal  
 Pharmacy: [address] [place] [address]. Rd.,[address] [place] [address] Carley Nair Ph #: [phone] We Performed the Following INFLUENZA VIRUS VAC QUAD,SPLIT,PRESV FREE SYRINGE IM [id number] CPT(R)] Follow-up Instructions Return in about 3 months (around 1/9/2018) for routine follow up. Patient Instructions Learning About Low-Carbohydrate Diets for Weight Loss What is a low-carbohydrate diet? Low-carb diets avoid foods that are high in carbohydrate. These high-carb foods include pasta, bread, rice, cereal, fruits, and starchy vegetables. Instead, these diets usually have you eat foods that are high in fat and protein. Many people lose weight quickly on a low-carb diet. But the early weight loss is water. People on this diet often gain the weight back after they start eating carbs again. Not all diet plans are safe or work well. A lot of the evidence shows that low-carb diets aren't healthy. That's because these diets often don't include healthy foods like fruits and vegetables. Losing weight safely means balancing protein, fat, and carbs with every meal and snack. And low-carb diets don't always provide the vitamins, minerals, and fiber you need. If you have a serious medical condition, talk to your doctor before you try any diet. These conditions include kidney disease, heart disease, type 2 diabetes, high cholesterol, and high blood pressure. If you are pregnant, it may not be safe for your baby if you are on a low-carb diet. How can you lose weight safely? You might have heard that a diet plan helped another person lose weight. But that doesn't mean that it will work for you. It is very hard to stay on a diet that includes lots of big changes in your eating habits. If you want to get to a healthy weight and stay there, making healthy lifestyle changes will often work better than dieting. These steps can help. · Make a plan for change. Work with your doctor to create a plan that is right for you. · See a dietitian. He or she can show you how to make healthy changes in your eating habits. · Manage stress. If you have a lot of stress in your life, it can be hard to focus on making healthy changes to your daily habits. · Track your food and activity. You are likely to do better at losing weight if you keep track of what you eat and what you do. Follow-up care is a key part of your treatment and safety. Be sure to make and go to all appointments, and call your doctor if you are having problems. It's also a good idea to know your test results and keep a list of the medicines you take. Where can you learn more? Go to http://etta-fer.info/. Enter A121 in the search box to learn more about \"Learning About Low-Carbohydrate Diets for Weight Loss. \" 
 Current as of: December 8, 2016 Content Version: 11.3 © 0450-3994 [employer], [employer]. Care instructions adapted under license by [employer] (which disclaims liability or warranty for this information). If you have questions about a medical condition or this instruction, always ask your healthcare professional. [address] any warranty or liability for your use of this information. Introducing [facility]! Dear Lynn Bowman: Thank you for requesting a coin4ce account. Our records indicate that you already have an active coin4ce account. You can access your account anytime at https://"[employer]". [facility]/"[employer] Did you know that you can access your hospital and ER discharge instructions at any time in coin4ce? You can also review all of your test results from your hospital stay or ER visit. Additional Information If you have questions, please visit the Frequently Asked Questions section of the coin4ce website at https://[employer]/"[employer]/. Remember, coin4ce is NOT to be used for urgent needs. For medical emergencies, dial 911. Now available from your iPhone and Android! Please provide this summary of care documentation to your next provider. Your primary care clinician is listed as Petra Fuentes. If you have any questions after today's visit, please call [phone].

## 2017-10-09 NOTE — PROGRESS NOTES
Subjective:     Pia Serrato is a 48 y.o. male who presents for follow up of hypertension, hyperlipidemia, history of prior stroke and obesity. Here with S.O.    New concerns: stable HAs seeing neurology, bladder issues seeing urology. Issues are doing okay, no specific complaints. Allergies   Allergen Reactions    Crestor [Rosuvastatin] Rash    Iodinated Contrast- Oral And Iv Dye Hives    Statins-Hmg-Coa Reductase Inhibitors Myalgia       Diet and Lifestyle: generally follows a low sodium diet, nonsmoker    Cardiovascular ROS: taking medications as instructed, no medication side effects noted, no TIA's, no chest pain on exertion, no dyspnea on exertion, no swelling of ankles. Review of Systems, additional:  Pertinent items are noted in HPI. 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       clopidogrel (PLAVIX) 75 mg tab TAKE ONE TABLET BY MOUTH ONCE DAILY 90 Tab 1    citalopram (CELEXA) 20 mg tablet TAKE ONE & ONE-HALF TABLETS BY MOUTH ONCE DAILY 45 Tab 5    gabapentin (NEURONTIN) 300 mg capsule Take 300 mg by mouth two (2) times a day.  codeine-butalbital-acetaminophen-caffeine (FIORICET WITH CODEINE) -14-30 mg per capsule Take  by mouth every four (4) hours as needed for Headache.  topiramate (TOPAMAX) 50 mg tablet TAKE ONE TABLET BY MOUTH TWICE DAILY 180 Tab 1    isosorbide mononitrate ER (IMDUR) 60 mg CR tablet TAKE ONE TABLET BY MOUTH IN THE MORNING 90 Tab 1    Ranolazine (RANEXA) 1,000 mg Tb12 Take 1 Tab by mouth two (2) times a day. 60 Tab 5    aspirin delayed-release 81 mg tablet Take 1 Tab by mouth daily. 90 Tab 3    pantoprazole (PROTONIX) 40 mg tablet TAKE ONE TABLET BY MOUTH TWICE DAILY 30 Tab 11    metoprolol tartrate (LOPRESSOR) 25 mg tablet Take 0.5 Tabs by mouth nightly. 45 Tab 3    SUMAtriptan (IMITREX) 100 mg tablet Take 1 Tab by mouth once as needed for Migraine. 10 Tab 5    evolocumab (REPATHA SURECLICK) pen injection 324 mg by SubCUTAneous route every fourteen (14) days.  omega-3 fatty acids-vitamin e (FISH OIL) 1,000 mg cap Take 1 Cap by mouth.  fexofenadine (ALLEGRA) 180 mg tablet Take  by mouth daily.          Allergies   Allergen Reactions    Crestor [Rosuvastatin] Rash    Iodinated Contrast- Oral And Iv Dye Hives    Statins-Hmg-Coa Reductase Inhibitors Myalgia     Social History   Substance Use Topics    Smoking status: Former Smoker     Packs/day: 0.30     Years: 30.00     Types: Cigarettes     Quit date: 9/19/2011    Smokeless tobacco: Never Used    Alcohol use No        Lab Results  Component Value Date/Time   WBC 7.4 04/26/2017 05:13 AM   HGB 13.7 04/26/2017 05:13 AM   HCT 38.0 04/26/2017 05:13 AM   PLATELET 444 22/44/6369 05:13 AM   MCV 96.7 04/26/2017 05:13 AM     Lab Results  Component Value Date/Time   Cholesterol, total 148 08/01/2017 09:54 AM   HDL Cholesterol 35 08/01/2017 09:54 AM   LDL,Direct 70 07/26/2016 09:43 AM   LDL, calculated 54 08/01/2017 09:54 AM Triglyceride 296 08/01/2017 09:54 AM   CHOL/HDL Ratio 5.1 04/25/2017 12:15 AM     Lab Results  Component Value Date/Time   ALT (SGPT) 19 08/01/2017 09:54 AM   AST (SGOT) 23 08/01/2017 09:54 AM   Alk. phosphatase 57 08/01/2017 09:54 AM   Bilirubin, total 0.3 08/01/2017 09:54 AM   Albumin 4.3 08/01/2017 09:54 AM   Protein, total 6.7 08/01/2017 09:54 AM   INR 1.1 04/25/2017 07:40 PM   Prothrombin time 13.5 04/25/2017 07:40 PM   PLATELET 478 93/65/9112 05:13 AM       Lab Results  Component Value Date/Time   GFR est non-AA 83 08/01/2017 09:54 AM   GFR est AA 95 08/01/2017 09:54 AM   Creatinine 1.06 08/01/2017 09:54 AM   BUN 15 08/01/2017 09:54 AM   Sodium 144 08/01/2017 09:54 AM   Potassium 4.3 08/01/2017 09:54 AM   Chloride 105 08/01/2017 09:54 AM   CO2 21 08/01/2017 09:54 AM   Magnesium 1.8 04/26/2017 05:13 AM     Lab Results   Component Value Date/Time    Glucose 98 08/01/2017 09:54 AM    Glucose (POC) 132 04/25/2017 12:21 AM    Glucose (POC) 119 07/13/2012 08:15 AM                         Objective:     Physical exam significant for the following:   Visit Vitals    BP 99/62 (BP 1 Location: Right arm, BP Patient Position: At rest)    Pulse 67    Temp 97.3 °F (36.3 °C) (Oral)    Resp 16    Ht 5' 10\" (1.778 m)    Wt 269 lb (122 kg)    SpO2 99%    BMI 38.6 kg/m2     Appearance: alert, well appearing, and in no distress. General exam: CVS exam BP noted to be well controlled today in office, S1, S2 normal, no gallop, no murmur, chest clear, no JVD, no HSM, no edema, neurological exam alert, oriented, normal speech, no focal findings or movement disorder noted, hemiparesis on left, upper. .   Assessment/Plan:     hypertension stable, cerebrovascular disease stable      ICD-10-CM ICD-9-CM    1. 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  Encounter for immunization Z23 V03.89 INFLUENZA VIRUS VAC QUAD,SPLIT,PRESV FREE SYRINGE IM      HI IMMUNIZ ADMIN,1 SINGLE/COMB VAC/TOXOID   3. Hemiparesis affecting left side as late effect of cerebrovascular accident (CVA) ([facility] [address] I69.354 438.20 HI IMMUNIZ ADMIN,1 SINGLE/COMB VAC/TOXOID   4. Coronary artery disease involving native coronary artery of native heart without angina pectoris I25.10 414.01 HI IMMUNIZ ADMIN,1 SINGLE/COMB VAC/TOXOID   5. Episodic cluster headache, not intractable G44.019 339.01 HI IMMUNIZ ADMIN,1 SINGLE/COMB VAC/TOXOID   6. Obesity (BMI 30-39. 9) E66.9 278.00 HI IMMUNIZ ADMIN,1 SINGLE/COMB VAC/TOXOID     Orders Placed This Encounter    HI IMMUNIZ ADMIN,1 SINGLE/COMB VAC/TOXOID    Influenza virus vaccine (QUADRIVALENT PRES FREE SYRINGE) IM ([id number])    mirabegron ER (MYRBETRIQ) 50 mg ER tablet     Sig: Take 50 mg by mouth daily.  metoprolol tartrate (LOPRESSOR) 25 mg tablet     Sig: Take 0.5 Tabs by mouth nightly. Dispense:  45 Tab     Refill:  3    pantoprazole (PROTONIX) 40 mg tablet     Sig: Take 1 Tab by mouth daily. Dispense:  90 Tab     Refill:  3    ranolazine ER (RANEXA) 1,000 mg     Sig: Take 1 Tab by mouth two (2) times a day. Dispense:  180 Tab     Refill:  3    isosorbide mononitrate ER (IMDUR) 60 mg CR tablet     Sig: TAKE ONE TABLET BY MOUTH IN THE MORNING     Dispense:  90 Tab     Refill:  3    topiramate (TOPAMAX) 50 mg tablet     Sig: TAKE ONE TABLET BY MOUTH TWICE DAILY     Dispense:  180 Tab     Refill:  3     Chronic Conditions Addressed Today     1. Hemiparesis affecting left side as late effect of cerebrovascular accident (CVA) ([facility] [address]     Relevant Medications     topiramate (TOPAMAX) 50 mg tablet     Other Relevant Orders     HI IMMUNIZ ADMIN,1 SINGLE/COMB VAC/TOXOID    2. Episodic cluster headache, not intractable     Relevant Medications     topiramate (TOPAMAX) 50 mg tablet     Other Relevant Orders     HI IMMUNIZ ADMIN,1 SINGLE/COMB VAC/TOXOID    3.  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 CAD (coronary artery disease)     Overview      7/12/12 s/p LAD PCI:            Relevant Medications     metoprolol tartrate (LOPRESSOR) 25 mg tablet     ranolazine ER (RANEXA) 1,000 mg     isosorbide mononitrate ER (IMDUR) 60 mg CR tablet     Other Relevant Orders     TX IMMUNIZ ADMIN,1 SINGLE/COMB VAC/TOXOID      Acute Diagnoses Addressed Today     Encounter for immunization            Relevant Orders        INFLUENZA VIRUS VAC QUAD,SPLIT,PRESV FREE SYRINGE IM (Completed)        TX IMMUNIZ ADMIN,1 SINGLE/COMB VAC/TOXOID    Obesity (BMI 30-39. 9)            Relevant Medications        topiramate (TOPAMAX) 50 mg tablet        Other Relevant Orders        TX IMMUNIZ ADMIN,1 SINGLE/COMB VAC/TOXOID        . Follow-up Disposition:  Return in about 3 months (around 1/9/2018) for routine follow up.

## 2017-10-09 NOTE — PATIENT INSTRUCTIONS
Learning About Low-Carbohydrate Diets for Weight Loss  What is a low-carbohydrate diet? Low-carb diets avoid foods that are high in carbohydrate. These high-carb foods include pasta, bread, rice, cereal, fruits, and starchy vegetables. Instead, these diets usually have you eat foods that are high in fat and protein. Many people lose weight quickly on a low-carb diet. But the early weight loss is water. People on this diet often gain the weight back after they start eating carbs again. Not all diet plans are safe or work well. A lot of the evidence shows that low-carb diets aren't healthy. That's because these diets often don't include healthy foods like fruits and vegetables. Losing weight safely means balancing protein, fat, and carbs with every meal and snack. And low-carb diets don't always provide the vitamins, minerals, and fiber you need. If you have a serious medical condition, talk to your doctor before you try any diet. These conditions include kidney disease, heart disease, type 2 diabetes, high cholesterol, and high blood pressure. If you are pregnant, it may not be safe for your baby if you are on a low-carb diet. How can you lose weight safely? You might have heard that a diet plan helped another person lose weight. But that doesn't mean that it will work for you. It is very hard to stay on a diet that includes lots of big changes in your eating habits. If you want to get to a healthy weight and stay there, making healthy lifestyle changes will often work better than dieting. These steps can help. · Make a plan for change. Work with your doctor to create a plan that is right for you. · See a dietitian. He or she can show you how to make healthy changes in your eating habits. · Manage stress. If you have a lot of stress in your life, it can be hard to focus on making healthy changes to your daily habits. · Track your food and activity.  You are likely to do better at losing weight if you keep track of what you eat and what you do. Follow-up care is a key part of your treatment and safety. Be sure to make and go to all appointments, and call your doctor if you are having problems. It's also a good idea to know your test results and keep a list of the medicines you take. Where can you learn more? Go to http://etta-fer.info/. Enter A121 in the search box to learn more about \"Learning About Low-Carbohydrate Diets for Weight Loss. \"  Current as of: December 8, 2016  Content Version: 11.3  © 1118-6178 "[employer]. Care instructions adapted under license by [employer] (which disclaims liability or warranty for this information). If you have questions about a medical condition or this instruction, always ask your healthcare professional. [address] any warranty or liability for your use of this information.

## 2018-01-09 ENCOUNTER — OFFICE VISIT (OUTPATIENT)
Dept: INTERNAL MEDICINE CLINIC | Age: 48
End: 2018-01-09

## 2018-01-09 VITALS
TEMPERATURE: 96 F | DIASTOLIC BLOOD PRESSURE: 60 MMHG | HEART RATE: 67 BPM | BODY MASS INDEX: 37.65 KG/M2 | HEIGHT: 70 IN | SYSTOLIC BLOOD PRESSURE: 90 MMHG | WEIGHT: 263 LBS | RESPIRATION RATE: 16 BRPM | OXYGEN SATURATION: 95 %

## 2018-01-09 DIAGNOSIS — E78.2 MIXED HYPERLIPIDEMIA: ICD-10-CM

## 2018-01-09 DIAGNOSIS — I25.10 CORONARY ARTERY DISEASE INVOLVING NATIVE CORONARY ARTERY OF NATIVE HEART WITHOUT ANGINA PECTORIS: Primary | ICD-10-CM

## 2018-01-09 DIAGNOSIS — H53.8 VISION BLURRING: ICD-10-CM

## 2018-01-09 DIAGNOSIS — I69.354 HEMIPARESIS AFFECTING LEFT SIDE AS LATE EFFECT OF CEREBROVASCULAR ACCIDENT (CVA) (HCC): ICD-10-CM

## 2018-01-09 RX ORDER — ISOSORBIDE MONONITRATE 30 MG/1
30 TABLET, EXTENDED RELEASE ORAL
Qty: 90 TAB | Refills: 3 | Status: SHIPPED | OUTPATIENT
Start: 2018-01-09 | End: 2018-06-04 | Stop reason: SDUPTHER

## 2018-01-09 NOTE — PROGRESS NOTES
Subjective:     Carrie Rodriguez is a 50 y.o. male who presents for follow up of hypertension, hyperlipidemia, coronary artery disease and history of prior stroke. Here with caregiver. New concerns: dizzyand woozy the last few days, not affected by positional changes not falling, HA not too bad not needed fioricet in a while, BP noted to be low. Dizziness come and goes. Difficulty with reading, no curtain. No new weakness. Current Outpatient Prescriptions   Medication Sig Dispense Refill    mirabegron ER (MYRBETRIQ) 50 mg ER tablet Take 50 mg by mouth daily.  metoprolol tartrate (LOPRESSOR) 25 mg tablet Take 0.5 Tabs by mouth nightly. 45 Tab 3    pantoprazole (PROTONIX) 40 mg tablet Take 1 Tab by mouth daily. 90 Tab 3    ranolazine ER (RANEXA) 1,000 mg Take 1 Tab by mouth two (2) times a day. 180 Tab 3    isosorbide mononitrate ER (IMDUR) 60 mg CR tablet TAKE ONE TABLET BY MOUTH IN THE MORNING 90 Tab 3    topiramate (TOPAMAX) 50 mg tablet TAKE ONE TABLET BY MOUTH TWICE DAILY 180 Tab 3    clopidogrel (PLAVIX) 75 mg tab TAKE ONE TABLET BY MOUTH ONCE DAILY 90 Tab 1    citalopram (CELEXA) 20 mg tablet TAKE ONE & ONE-HALF TABLETS BY MOUTH ONCE DAILY 45 Tab 5    gabapentin (NEURONTIN) 300 mg capsule Take 300 mg by mouth two (2) times a day.  aspirin delayed-release 81 mg tablet Take 1 Tab by mouth daily. 90 Tab 3    evolocumab (REPATHA SURECLICK) pen injection 497 mg by SubCUTAneous route every fourteen (14) days.  omega-3 fatty acids-vitamin e (FISH OIL) 1,000 mg cap Take 1 Cap by mouth.  fexofenadine (ALLEGRA) 180 mg tablet Take  by mouth daily.          Allergies   Allergen Reactions    Crestor [Rosuvastatin] Rash    Iodinated Contrast- Oral And Iv Dye Hives    Statins-Hmg-Coa Reductase Inhibitors Myalgia       Diet and Lifestyle: generally follows a low fat low cholesterol diet, nonsmoker    Cardiovascular ROS: taking medications as instructed, no medication side effects noted, no TIA's, no chest pain on exertion, no dyspnea on exertion, no swelling of ankles. C/o diffculty reading. Review of Systems, additional:  Pertinent items are noted in HPI. Social History   Substance Use Topics    Smoking status: Former Smoker     Packs/day: 0.30     Years: 30.00     Types: Cigarettes     Quit date: 9/19/2011    Smokeless tobacco: Never Used    Alcohol use No        Lab Results  Component Value Date/Time   Cholesterol, total 148 08/01/2017 09:54 AM   HDL Cholesterol 35 08/01/2017 09:54 AM   LDL,Direct 70 07/26/2016 09:43 AM   LDL, calculated 54 08/01/2017 09:54 AM   Triglyceride 296 08/01/2017 09:54 AM   CHOL/HDL Ratio 5.1 04/25/2017 12:15 AM     Lab Results  Component Value Date/Time   ALT (SGPT) 19 08/01/2017 09:54 AM   AST (SGOT) 23 08/01/2017 09:54 AM   Alk. phosphatase 57 08/01/2017 09:54 AM   Bilirubin, total 0.3 08/01/2017 09:54 AM   Albumin 4.3 08/01/2017 09:54 AM   Protein, total 6.7 08/01/2017 09:54 AM   INR 1.1 04/25/2017 07:40 PM   Prothrombin time 13.5 04/25/2017 07:40 PM   PLATELET 777 96/67/1235 05:13 AM       Lab Results  Component Value Date/Time   GFR est non-AA 83 08/01/2017 09:54 AM   GFR est AA 95 08/01/2017 09:54 AM   Creatinine 1.06 08/01/2017 09:54 AM   BUN 15 08/01/2017 09:54 AM   Sodium 144 08/01/2017 09:54 AM   Potassium 4.3 08/01/2017 09:54 AM   Chloride 105 08/01/2017 09:54 AM   CO2 21 08/01/2017 09:54 AM   Magnesium 1.8 04/26/2017 05:13 AM     Lab Results   Component Value Date/Time    Glucose 98 08/01/2017 09:54 AM    Glucose (POC) 132 04/25/2017 12:21 AM    Glucose (POC) 119 07/13/2012 08:15 AM             Objective:     Physical exam significant for the following: left hemiparesis ritika upper    Visit Vitals    BP 90/60    Pulse 67    Temp 96 °F (35.6 °C) (Oral)    Resp 16    Ht 5' 10\" (1.778 m)    Wt 263 lb (119.3 kg)    SpO2 95%    BMI 37.74 kg/m2     Appearance: alert, well appearing, and in no distress.   General exam: CVS exam S1, S2 normal, no gallop, no murmur, chest clear, no JVD, no HSM, no edema, low BP   0/5 on left 5/5 right  2-12 intact. .   Assessment/Plan:     hypertension stable, cerebrovascular disease stable. Hypotension try to dec ntg if more CP re start 60 mg      ICD-10-CM ICD-9-CM    1. Coronary artery disease involving native coronary artery of native heart without angina pectoris I25.10 414.01    2. Hemiparesis affecting left side as late effect of cerebrovascular accident (CVA) ([facility] [address] I69.354 438.20    3. Vision blurring H53.8 368.8 REFERRAL TO OPTOMETRY   4. Mixed hyperlipidemia E78.2 272.2      CAD stable  Send to see eye doctor    Orders Placed This Encounter    REFERRAL TO OPTOMETRY     Referral Priority:   Routine     Referral Type:   Consultation     Referral Reason:   Specialty Services Required     Referred to Provider:   Abdiaziz Park OD    isosorbide mononitrate ER (IMDUR) 30 mg tablet     Sig: Take 1 Tab by mouth every morning. TAKE ONE TABLET BY MOUTH IN THE MORNING     Dispense:  80 Tab     Refill:  3     Follow-up Disposition:  Return in about 6 weeks (around 2/20/2018) for routine follow up. Chronic Conditions Addressed Today     1. Mixed hyperlipidemia    2. Hemiparesis affecting left side as late effect of cerebrovascular accident (CVA) ([facility] [address]    3.  Coronary artery disease involving native coronary artery of native heart without angina pectoris - Primary     Relevant Medications     isosorbide mononitrate ER (IMDUR) 30 mg tablet      Acute Diagnoses Addressed Today     Vision blurring            Relevant Orders        REFERRAL TO OPTOMETRY

## 2018-01-09 NOTE — PROGRESS NOTES
Chief Complaint   Patient presents with    Dizziness     dizziness yesterday   low BP     I have reviewed the patient's medical history in detail and updated the computerized patient record. Health Maintenance reviewed. 1. Have you been to the ER, urgent care clinic since your last visit? Hospitalized since your last visit?no    2. Have you seen or consulted any other health care providers outside of the [address] [place] [address] since your last visit? Include any pap smears or colon screening. No      Fall Risk Assessment, last 12 mths 1/9/2018   Able to walk? Yes   Fall in past 12 months? No   Fall with injury? -   Number of falls in past 12 months -   Fall Risk Score -       PHQ over the last two weeks 1/9/2018   Little interest or pleasure in doing things Several days   Feeling down, depressed or hopeless Several days   Total Score PHQ 2 2   Trouble falling or staying asleep, or sleeping too much -   Feeling tired or having little energy -   Poor appetite or overeating -   Feeling bad about yourself - or that you are a failure or have let yourself or your family down -   Trouble concentrating on things such as school, work, reading or watching TV -   Moving or speaking so slowly that other people could have noticed; or the opposite being so fidgety that others notice -   Thoughts of being better off dead, or hurting yourself in some way -   How difficult have these problems made it for you to do your work, take care of your home and get along with others -       Abuse Screening Questionnaire 1/9/2018   Do you ever feel afraid of your partner? N   Are you in a relationship with someone who physically or mentally threatens you? N   Is it safe for you to go home?  Y       ADL Assessment 1/9/2018   Feeding yourself No Help Needed   Getting from bed to chair No Help Needed   Getting dressed No Help Needed   Bathing or showering No Help Needed   Walk across the room (includes cane/walker) No Help Needed Using the telphone Help Needed   Taking your medications Help Needed   Preparing meals Help Needed   Managing money (expenses/bills) Help Needed   Moderately strenuous housework (laundry) Help Needed   Shopping for personal items (toiletries/medicines) Help Needed   Shopping for groceries Help Needed   Driving Help Needed   Climbing a flight of stairs Help Needed   Getting to places beyond walking distances Help Needed

## 2018-01-09 NOTE — MR AVS SNAPSHOT
Visit Information Date & Time Provider Department Dept. Phone Encounter #  
 1/9/2018  2:45 PM Hazle Hammans,  [address] [id number] Follow-up Instructions Return in about 6 weeks (around 2/20/2018) for routine follow up. Your Appointments 2/6/2018  9:00 AM  
6 MONTH [facility] Appt Note: $0cp  ekr [id number] [facility]  
[phone] [id number] [facility] Upcoming Health Maintenance Date Due DTaP/Tdap/Td series (2 - Td) 11/30/2025 Allergies as of 1/9/2018  Review Complete On: 1/9/2018 By: Hazle Hammans, MD  
  
 Severity Noted Reaction Type Reactions Crestor [Rosuvastatin]  12/11/2014    Rash Iodinated Contrast- Oral And Iv Dye  02/26/2013    Hives Statins-hmg-coa Reductase Inhibitors  07/26/2016    Myalgia Current Immunizations  Reviewed on 1/9/2018 Name Date Influenza Vaccine (Quad) PF 10/9/2017, 9/12/2016, 11/30/2015 Tdap 11/30/2015 Reviewed by Hazle Hammans, MD on 1/9/2018 at  3:42 PM  
You Were Diagnosed With   
  
 Codes Comments Coronary artery disease involving native coronary artery of native heart without angina pectoris    -  Primary ICD-10-CM: I25.10 ICD-9-CM: 414.01 Hemiparesis affecting left side as late effect of cerebrovascular accident (CVA) ([facility] [address]     ICD-10-CM: A22.086 ICD-9-CM: 438.20 Vision blurring     ICD-10-CM: H53.8 ICD-9-CM: 368.8 Mixed hyperlipidemia     ICD-10-CM: E78.2 ICD-9-CM: 272.2 Vitals BP Pulse Temp Resp Height(growth percentile) Weight(growth percentile) 90/60 67 96 °F (35.6 °C) (Oral) 16 5' 10\" (1.778 m) 263 lb (119.3 kg) SpO2 BMI Smoking Status 95% 37.74 kg/m2 Former Smoker Vitals History BMI and BSA Data  Body Mass Index Body Surface Area  
 37.74 kg/m 2 2.43 m 2  
  
  
 Preferred Pharmacy Pharmacy Name Phone [facility] [place], [address] [place] [phone] Your Updated Medication List  
  
   
This list is accurate as of: 1/9/18  3:43 PM.  Always use your most recent med list. ALLEGRA 180 mg tablet Generic drug:  fexofenadine Take  by mouth daily. aspirin delayed-release 81 mg tablet Take 1 Tab by mouth daily. citalopram 20 mg tablet Commonly known as:  CELEXA  
TAKE ONE & ONE-HALF TABLETS BY MOUTH ONCE DAILY  
  
 clopidogrel 75 mg Tab Commonly known as:  PLAVIX TAKE ONE TABLET BY MOUTH ONCE DAILY  
  
 evolocumab pen injection Commonly known as:  REPATHA SURECLICK  
091 mg by SubCUTAneous route every fourteen (14) days. FISH OIL 1,000 mg Cap Generic drug:  omega-3 fatty acids-vitamin e Take 1 Cap by mouth.  
  
 gabapentin 300 mg capsule Commonly known as:  NEURONTIN Take 300 mg by mouth two (2) times a day. isosorbide mononitrate ER 30 mg tablet Commonly known as:  IMDUR Take 1 Tab by mouth every morning. TAKE ONE TABLET BY MOUTH IN THE MORNING  
  
 metoprolol tartrate 25 mg tablet Commonly known as:  LOPRESSOR Take 0.5 Tabs by mouth nightly. MYRBETRIQ 50 mg ER tablet Generic drug:  mirabegron ER Take 50 mg by mouth daily. pantoprazole 40 mg tablet Commonly known as:  PROTONIX Take 1 Tab by mouth daily. ranolazine ER 1,000 mg  
Commonly known as:  RANEXA Take 1 Tab by mouth two (2) times a day. topiramate 50 mg tablet Commonly known as:  TOPAMAX TAKE ONE TABLET BY MOUTH TWICE DAILY Prescriptions Sent to Pharmacy Refills  
 isosorbide mononitrate ER (IMDUR) 30 mg tablet 3 Sig: Take 1 Tab by mouth every morning. TAKE ONE TABLET BY MOUTH IN THE MORNING Class: Normal  
 Pharmacy: [address] [facility] [address] [place] [address] Ph #: [phone] Route: Oral  
  
We Performed the Following REFERRAL TO OPTOMETRY [id number] Custom] Follow-up Instructions Return in about 6 weeks (around 2/20/2018) for routine follow up. Referral Information Referral ID Referred By Referred To  
  
 [id number] Aparna Woodall, [address] [address]  Phone: [phone] Fax: [phone] Visits Status Start Date End Date 1 New Request 1/9/18 1/9/19 If your referral has a status of pending review or denied, additional information will be sent to support the outcome of this decision. Introducing [facility]! Dear Joaquin Iglesias: Thank you for requesting a [employer] account. Our records indicate that you already have an active Boxbe account. You can access your account anytime at [email or web address] Did you know that you can access your hospital and ER discharge instructions at any time in Boxbe? You can also review all of your test results from your hospital stay or ER visit. Additional Information If you have questions, please visit the Frequently Asked Questions section of the Boxbe website at [email or web address] Remember, Boxbe is NOT to be used for urgent needs. For medical emergencies, dial 911. Now available from your iPhone and Android! Please provide this summary of care documentation to your next provider. Your primary care clinician is listed as Sherwin Lopez. If you have any questions after today's visit, please call [phone].

## 2018-01-18 ENCOUNTER — TELEPHONE (OUTPATIENT)
Dept: CARDIOLOGY CLINIC | Age: 48
End: 2018-01-18

## 2018-01-18 NOTE — TELEPHONE ENCOUNTER
Chanda with [facility] [address] called regarding med refil for pt \"Repatha 140mg) . Advd faxed request and & spoke to [facility] on 1/16/18. Pt is out of this medication. Need by day was 1/15/18. Tel# [phone] option 1 then option 6.    Ref #[id number]    Please advise  Thanks  Karen Valencia

## 2018-01-22 RX ORDER — EVOLOCUMAB 140 MG/ML
INJECTION, SOLUTION SUBCUTANEOUS
Qty: 1 PEN | Refills: 10 | Status: SHIPPED | OUTPATIENT
Start: 2018-01-22 | End: 2018-11-07 | Stop reason: SDUPTHER

## 2018-02-06 ENCOUNTER — OFFICE VISIT (OUTPATIENT)
Dept: CARDIOLOGY CLINIC | Age: 48
End: 2018-02-06

## 2018-02-06 VITALS
BODY MASS INDEX: 37.87 KG/M2 | RESPIRATION RATE: 16 BRPM | WEIGHT: 264.5 LBS | SYSTOLIC BLOOD PRESSURE: 120 MMHG | OXYGEN SATURATION: 97 % | HEART RATE: 76 BPM | HEIGHT: 70 IN | DIASTOLIC BLOOD PRESSURE: 80 MMHG

## 2018-02-06 DIAGNOSIS — Z98.61 S/P PTCA (PERCUTANEOUS TRANSLUMINAL CORONARY ANGIOPLASTY): ICD-10-CM

## 2018-02-06 DIAGNOSIS — I63.341 CEREBRAL INFARCTION DUE TO THROMBOSIS OF RIGHT CEREBELLAR ARTERY (HCC): ICD-10-CM

## 2018-02-06 DIAGNOSIS — E78.2 MIXED HYPERLIPIDEMIA: ICD-10-CM

## 2018-02-06 DIAGNOSIS — I25.10 CORONARY ARTERY DISEASE INVOLVING NATIVE CORONARY ARTERY OF NATIVE HEART WITHOUT ANGINA PECTORIS: Primary | ICD-10-CM

## 2018-02-06 DIAGNOSIS — I10 ESSENTIAL HYPERTENSION: ICD-10-CM

## 2018-02-06 NOTE — PROGRESS NOTES
Chief Complaint   Patient presents with    Cholesterol Problem     6 month follow up     1. Have you been to the ER, urgent care clinic since your last visit? Hospitalized since your last visit? No    2. Have you seen or consulted any other health care providers outside of the [address] [place] [address] since your last visit? Include any pap smears or colon screening.  No

## 2018-02-06 NOTE — PROGRESS NOTES
2/6/2018 9:28 AM      Subjective:     Carrie Rodriguez is here for f/u visit. Doing very well from CV standpoint. He denies chest pain, chest pressure/discomfort, dyspnea, palpitations, irregular heart beats, near-syncope, syncope, fatigue, orthopnea, paroxysmal nocturnal dyspnea, exertional chest pressure/discomfort, claudication, lower extremity edema. Visit Vitals    /80 (BP 1 Location: Right arm, BP Patient Position: Sitting)    Pulse 76    Resp 16    Ht 5' 10\" (1.778 m)    Wt 264 lb 8 oz (120 kg)    SpO2 97%    BMI 37.95 kg/m2     Current Outpatient Prescriptions   Medication Sig    REPATHA SURECLICK pen injection Inject 140 mg under the skin every 2 weeks    isosorbide mononitrate ER (IMDUR) 30 mg tablet Take 1 Tab by mouth every morning. TAKE ONE TABLET BY MOUTH IN THE MORNING (Patient taking differently: Take 60 mg by mouth every morning. TAKE ONE TABLET BY MOUTH IN THE MORNING)    mirabegron ER (MYRBETRIQ) 50 mg ER tablet Take 50 mg by mouth daily.  metoprolol tartrate (LOPRESSOR) 25 mg tablet Take 0.5 Tabs by mouth nightly.  pantoprazole (PROTONIX) 40 mg tablet Take 1 Tab by mouth daily.  ranolazine ER (RANEXA) 1,000 mg Take 1 Tab by mouth two (2) times a day.  topiramate (TOPAMAX) 50 mg tablet TAKE ONE TABLET BY MOUTH TWICE DAILY    clopidogrel (PLAVIX) 75 mg tab TAKE ONE TABLET BY MOUTH ONCE DAILY    citalopram (CELEXA) 20 mg tablet TAKE ONE & ONE-HALF TABLETS BY MOUTH ONCE DAILY    gabapentin (NEURONTIN) 300 mg capsule Take 300 mg by mouth two (2) times a day.  aspirin delayed-release 81 mg tablet Take 1 Tab by mouth daily.  omega-3 fatty acids-vitamin e (FISH OIL) 1,000 mg cap Take 1 Cap by mouth.  fexofenadine (ALLEGRA) 180 mg tablet Take  by mouth daily. No current facility-administered medications for this visit.           Objective:      Visit Vitals    /80 (BP 1 Location: Right arm, BP Patient Position: Sitting)    Pulse 76    Resp 16    Ht 5' 10\" (1.778 m)    Wt 264 lb 8 oz (120 kg)    SpO2 97%    BMI 37.95 kg/m2       Data Review:     EKG: Normal sinus rhythm, non specific T wave changes    Reviewed and/or ordered active problem list, medication list tests    Past Medical History:   Diagnosis Date    CAD (coronary artery disease)     GERD (gastroesophageal reflux disease)     Headache(784.0)     Hypertension     Stroke ([facility] [address] 2011      Past Surgical History:   Procedure Laterality Date    CARDIAC SURG PROCEDURE UNLIST      HX CORONARY STENT PLACEMENT       Allergies   Allergen Reactions    Crestor [Rosuvastatin] Rash    Iodinated Contrast- Oral And Iv Dye Hives    Statins-Hmg-Coa Reductase Inhibitors Myalgia      Family History   Problem Relation Age of Onset    Heart Disease Mother     Hypertension Mother     Diabetes Father     Heart Disease Father       Social History     Social History    Marital status: SINGLE     Spouse name: N/A    Number of children: 0    Years of education: N/A     Occupational History    disabled Not Employed     Social History Main Topics    Smoking status: Former Smoker     Packs/day: 0.30     Years: 30.00     Types: Cigarettes     Quit date: 9/19/2011    Smokeless tobacco: Never Used    Alcohol use No    Drug use: No    Sexual activity: Not Currently     Other Topics Concern    Not on file     Social History Narrative    Lives on his own friends and relatives look in on him. Review of Systems     General: Not Present- Anorexia, Chills, Dietary Changes, Fatigue, Fever, Medication Changes, Night Sweats, Weight Gain > 10lbs. and Weight Loss > 10lbs. .  Skin: Not Present- Bruising and Excessive Sweating. HEENT: Not Present- Headache, Visual Loss and Vertigo. Respiratory: Not Present- Cough, Decreased Exercise Tolerance, Difficulty Breathing, Snoring and Wheezing.   Cardiovascular: Not Present- Abnormal Blood Pressure, Chest Pain, Claudications, Difficulty Breathing On Exertion, Edema, Fainting / Blacking Out, Irregular Heart Beat, Night Cramps, Orthopnea, Palpitations, Paroxysmal Nocturnal Dyspnea, Rapid Heart Rate, Shortness of Breath and Swelling of Extremities. Gastrointestinal: Not Present- Black, Tarry Stool, Bloody Stool, Diarrhea, Hematemesis, Rectal Bleeding and Vomiting. Musculoskeletal: Not Present- Muscle Pain and Muscle Weakness. Neurological: Not Present- Dizziness. Psychiatric: Not Present- Depression. Endocrine: Not Present- Cold Intolerance, Heat Intolerance and Thyroid Problems. Hematology: Not Present- Abnormal Bleeding, Anemia, Blood Clots and Easy Bruising.       Physical Exam   The physical exam findings are as follows:       General   Mental Status - Alert. General Appearance - Not in acute distress. Chest and Lung Exam   Inspection: Accessory muscles - No use of accessory muscles in breathing. Auscultation:   Breath sounds: - Normal.      Cardiovascular   Inspection: Jugular vein - Bilateral - Inspection Normal.  Palpation/Percussion:   Apical Impulse: - Normal.  Auscultation: Rhythm - Regular. Heart Sounds - S1 WNL and S2 WNL. No S3 or S4. Murmurs & Other Heart Sounds: Auscultation of the heart reveals - No Murmurs. Carotid arteries - No Carotid bruit. Peripheral Vascular   Upper Extremity: Inspection - Bilateral - No Cyanotic nailbeds or Digital clubbing. Lower Extremity:   Palpation: Dorsalis pedis pulse - Bilateral - Normal. Posterior tibia pulse - Bilateral - Normal. Edema - Bilateral - No edema. Assessment:       ICD-10-CM ICD-9-CM    1. Coronary artery disease involving native coronary artery of native heart without angina pectoris I25.10 414.01    2. Mixed hyperlipidemia E78.2 272.2 AMB POC EKG ROUTINE W/ 12 LEADS, INTER & REP   3. 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 S/P PTCA (percutaneous transluminal coronary angioplasty) Z98.61 V45.82        Plan:     1. CAD: stable. Medical treatment. Normal stress test in 01/2016. Normal LVEF on last Echo 04/2017. 2. Hyperlipidemia: on repatha now. Last FLP reviewed. Diet d/w pt.     3. HTN: stable. 4. CVA: f/u with neurology.

## 2018-02-06 NOTE — MR AVS SNAPSHOT
[address] 
[phone] Patient: Minor Garcia MRN: [id number] :1970 Visit Information Date & Time Provider Department Dept. Phone Encounter #  
 2018  9:00 AM Jhon Sanchez, [address] [place] [address] [facility] [phone] [id number] Follow-up Instructions Return in about 6 months (around 2018). Routing History Follow-up and Disposition History Your Appointments 2018  2:45 PM  
ROUTINE CARE with Oscar Corona MD  
[facility] ([facility]) Appt Note: f/u on medication $0 cp [facility] 18  
 [address] [place] [address]. [address] [place] [address]  
[phone]  
  
   
 [address] [place] [address] [address] Upcoming Health Maintenance Date Due DTaP/Tdap/Td series (2 - Td) 2025 Allergies as of 2018  Review Complete On: 2018 By: Jhon Sanchez MD  
  
 Severity Noted Reaction Type Reactions Crestor [Rosuvastatin]  2014    Rash Iodinated Contrast- Oral And Iv Dye  2013    Hives Statins-hmg-coa Reductase Inhibitors  2016    Myalgia Current Immunizations  Reviewed on 2018 Name Date Influenza Vaccine (Quad) PF 10/9/2017, 2016, 2015 Tdap 2015 Not reviewed this visit You Were Diagnosed With   
  
 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   
 S/P PTCA (percutaneous transluminal coronary angioplasty)     ICD-10-CM: Z98.61 ICD-9-CM: V45.82 Essential hypertension     ICD-10-CM: I10 
ICD-9-CM: 401.9 Vitals BP Pulse Resp Height(growth percentile) Weight(growth percentile) SpO2  
 120/80 (BP 1 Location: Right arm, BP Patient Position: Sitting) 76 16 5' 10\" (1.778 m) 264 lb 8 oz (120 kg) 97% BMI Smoking Status 37.95 kg/m2 Former Smoker Vitals History BMI and BSA Data Body Mass Index Body Surface Area  
 37.95 kg/m 2 2.43 m 2 Preferred Pharmacy Pharmacy Name Phone [facility], [address] [place] [address] Your Updated Medication List  
  
   
This list is accurate as of: 2/6/18  9:33 AM.  Always use your most recent med list. ALLEGRA 180 mg tablet Generic drug:  fexofenadine Take  by mouth daily. aspirin delayed-release 81 mg tablet Take 1 Tab by mouth daily. citalopram 20 mg tablet Commonly known as:  CELEXA  
TAKE ONE & ONE-HALF TABLETS BY MOUTH ONCE DAILY  
  
 clopidogrel 75 mg Tab Commonly known as:  PLAVIX TAKE ONE TABLET BY MOUTH ONCE DAILY FISH OIL 1,000 mg Cap Generic drug:  omega-3 fatty acids-vitamin e Take 1 Cap by mouth.  
  
 gabapentin 300 mg capsule Commonly known as:  NEURONTIN Take 300 mg by mouth two (2) times a day. isosorbide mononitrate ER 30 mg tablet Commonly known as:  IMDUR Take 1 Tab by mouth every morning. TAKE ONE TABLET BY MOUTH IN THE MORNING  
  
 metoprolol tartrate 25 mg tablet Commonly known as:  LOPRESSOR Take 0.5 Tabs by mouth nightly. MYRBETRIQ 50 mg ER tablet Generic drug:  mirabegron ER Take 50 mg by mouth daily. pantoprazole 40 mg tablet Commonly known as:  PROTONIX Take 1 Tab by mouth daily. ranolazine ER 1,000 mg  
Commonly known as:  RANEXA Take 1 Tab by mouth two (2) times a day. REPATHA SURECLICK pen injection Generic drug:  evolocumab Inject 140 mg under the skin every 2 weeks  
  
 topiramate 50 mg tablet Commonly known as:  TOPAMAX TAKE ONE TABLET BY MOUTH TWICE DAILY We Performed the Following AMB POC EKG ROUTINE W/ 12 LEADS, INTER & REP [67243 CPT(R)] Follow-up Instructions Return in about 6 months (around 8/6/2018). Introducing [facility] & [facility] SERVICES! Dear Jovi Presser: Thank you for requesting a [employer] account. Our records indicate that you already have an active [employer] account. You can access your account anytime at [email or web address] Did you know that you can access your hospital and ER discharge instructions at any time in [employer]? You can also review all of your test results from your hospital stay or ER visit. Additional Information If you have questions, please visit the Frequently Asked Questions section of the [employer] website at [email or web address] Remember, [employer] is NOT to be used for urgent needs. For medical emergencies, dial 911. Now available from your iPhone and Android! Please provide this summary of care documentation to your next provider. Your primary care clinician is listed as Oumou Horner. If you have any questions after today's visit, please call [phone].

## 2018-02-28 RX ORDER — CLOPIDOGREL BISULFATE 75 MG/1
TABLET ORAL
Qty: 90 TAB | Refills: 1 | Status: SHIPPED | OUTPATIENT
Start: 2018-02-28 | End: 2018-06-04 | Stop reason: SDUPTHER

## 2018-02-28 RX ORDER — CITALOPRAM 20 MG/1
TABLET, FILM COATED ORAL
Qty: 135 TAB | Refills: 1 | Status: SHIPPED | OUTPATIENT
Start: 2018-02-28 | End: 2018-06-04 | Stop reason: SDUPTHER

## 2018-02-28 NOTE — TELEPHONE ENCOUNTER
Requested Prescriptions     Pending Prescriptions Disp Refills    citalopram (CELEXA) 20 mg tablet [Pharmacy Med Name: CITALOPRAM 20MG     TAB] 135 Tab 1     Sig: TAKE ONE & ONE-HALF TABLETS BY MOUTH ONCE DAILY    clopidogrel (PLAVIX) 75 mg tab [Pharmacy Med Name: CLOPIDOGREL 75MG    TAB] 90 Tab 1     Sig: TAKE ONE TABLET BY MOUTH ONCE DAILY     Last office visit 1/9/18  Future 3/19/18  Last filled  8/30/17  -  8/30/17  Changes made to medication on last visit  none

## 2018-04-02 ENCOUNTER — DOCUMENTATION ONLY (OUTPATIENT)
Dept: INTERNAL MEDICINE CLINIC | Age: 48
End: 2018-04-02

## 2018-04-02 ENCOUNTER — OFFICE VISIT (OUTPATIENT)
Dept: INTERNAL MEDICINE CLINIC | Age: 48
End: 2018-04-02

## 2018-04-02 VITALS
TEMPERATURE: 96.3 F | WEIGHT: 265 LBS | SYSTOLIC BLOOD PRESSURE: 91 MMHG | OXYGEN SATURATION: 100 % | HEIGHT: 70 IN | HEART RATE: 63 BPM | BODY MASS INDEX: 37.94 KG/M2 | RESPIRATION RATE: 16 BRPM | DIASTOLIC BLOOD PRESSURE: 66 MMHG

## 2018-04-02 DIAGNOSIS — I10 ESSENTIAL HYPERTENSION: ICD-10-CM

## 2018-04-02 DIAGNOSIS — R07.1 CHEST PAIN ON BREATHING: Primary | ICD-10-CM

## 2018-04-02 DIAGNOSIS — R05.9 COUGH: ICD-10-CM

## 2018-04-02 DIAGNOSIS — I69.354 HEMIPARESIS AFFECTING LEFT SIDE AS LATE EFFECT OF CEREBROVASCULAR ACCIDENT (CVA) (HCC): ICD-10-CM

## 2018-04-02 PROBLEM — E66.01 SEVERE OBESITY (BMI 35.0-39.9) WITH COMORBIDITY (HCC): Status: ACTIVE | Noted: 2018-04-02

## 2018-04-02 NOTE — MR AVS SNAPSHOT
[address] [address] [facility] 
[phone] Patient: Ed Johns MRN: [id number] :1970 Visit Information Date & Time Provider Department Dept. Phone Encounter #  
 2018  2:45 PM MD Francisco [address] [id number] Follow-up Instructions Return in about 4 weeks (around 2018) for routine follow up. Your Appointments 2018  9:15 AM  
ESTABLISHED PATIENT with Luz Murillo MD  
[facility]) [address] [facility]  
[phone] [address] [facility] Upcoming Health Maintenance Date Due DTaP/Tdap/Td series (2 - Td) 2025 Allergies as of 2018  Review Complete On: 2018 By: Luz Murillo MD  
  
 Severity Noted Reaction Type Reactions Crestor [Rosuvastatin]  2014    Rash Iodinated Contrast- Oral And Iv Dye  2013    Hives Statins-hmg-coa Reductase Inhibitors  2016    Myalgia Current Immunizations  Reviewed on 2018 Name Date Influenza Vaccine (Quad) PF 10/9/2017, 2016, 2015 Tdap 2015 Not reviewed this visit You Were Diagnosed With   
  
 Codes Comments Chest pain on breathing    -  Primary ICD-10-CM: R07.1 ICD-9-CM: 786.52 Cough     ICD-10-CM: R05 ICD-9-CM: 786.2 Essential hypertension     ICD-10-CM: I10 
ICD-9-CM: 401.9 Hemiparesis affecting left side as late effect of cerebrovascular accident (CVA) ([facility] [address]     ICD-10-CM: P94.360 ICD-9-CM: 438.20 Vitals BP Pulse Temp Resp Height(growth percentile) Weight(growth percentile) 91/66 (BP 1 Location: Right arm, BP Patient Position: Sitting) 63 96.3 °F (35.7 °C) (Oral) 16 5' 10\" (1.778 m) 265 lb (120.2 kg) SpO2 BMI Smoking Status 100% 38.02 kg/m2 Former Smoker Vitals History BMI and BSA Data Body Mass Index Body Surface Area 38.02 kg/m 2 2.44 m 2 Preferred Pharmacy Pharmacy Name Phone [facility] [phone] Your Updated Medication List  
  
   
This list is accurate as of 4/2/18  4:03 PM.  Always use your most recent med list. ALLEGRA 180 mg tablet Generic drug:  fexofenadine Take  by mouth daily. aspirin delayed-release 81 mg tablet Take 1 Tab by mouth daily. citalopram 20 mg tablet Commonly known as:  CELEXA  
TAKE ONE & ONE-HALF TABLETS BY MOUTH ONCE DAILY  
  
 clopidogrel 75 mg Tab Commonly known as:  PLAVIX TAKE ONE TABLET BY MOUTH ONCE DAILY FISH OIL 1,000 mg Cap Generic drug:  omega-3 fatty acids-vitamin e Take 1 Cap by mouth.  
  
 gabapentin 300 mg capsule Commonly known as:  NEURONTIN Take 300 mg by mouth two (2) times a day. isosorbide mononitrate ER 30 mg tablet Commonly known as:  IMDUR Take 1 Tab by mouth every morning. TAKE ONE TABLET BY MOUTH IN THE MORNING  
  
 metoprolol tartrate 25 mg tablet Commonly known as:  LOPRESSOR Take 0.5 Tabs by mouth nightly. MYRBETRIQ 50 mg ER tablet Generic drug:  mirabegron ER Take 50 mg by mouth daily. pantoprazole 40 mg tablet Commonly known as:  PROTONIX Take 1 Tab by mouth daily. ranolazine ER 1,000 mg  
Commonly known as:  RANEXA Take 1 Tab by mouth two (2) times a day. REPATHA SURECLICK pen injection Generic drug:  evolocumab Inject 140 mg under the skin every 2 weeks  
  
 topiramate 50 mg tablet Commonly known as:  TOPAMAX TAKE ONE TABLET BY MOUTH TWICE DAILY We Performed the Following AMB POC EKG ROUTINE W/ 12 LEADS, INTER & REP [61596 CPT(R)] REFERRAL TO CARDIOLOGY [MWJ88 Custom] Comments: CP with deep breaths Hx of CVA Follow-up Instructions Return in about 4 weeks (around 4/30/2018) for routine follow up. To-Do List   
 04/02/2018 Imaging:  XR CHEST PA LAT Referral Information Referral ID Referred By Referred To  
  
 [id number] Tisha TINAJERO MD   
   [address] [address] Phone: [phone] Fax: [phone] Visits Status Start Date End Date 1 New Request 4/2/18 4/2/19 If your referral has a status of pending review or denied, additional information will be sent to support the outcome of this decision. Introducing [facility] HE[facility] SERVICES! Dear Harleen Harman: Thank you for requesting a [employer] account. Our records indicate that you already have an active [employer] account. You can access your account anytime at [email or web address]. [employer] Did you know that you can access your hospital and ER discharge instructions at any time in [employer]? You can also review all of your test results from your hospital stay or ER visit. Additional Information If you have questions, please visit the Frequently Asked Questions section of the [employer] website at [email or web address]. [employer]/. Remember, [employer] is NOT to be used for urgent needs. For medical emergencies, dial 911. Now available from your iPhone and Android! Please provide this summary of care documentation to your next provider. Your primary care clinician is listed as Kofi Smith. If you have any questions after today's visit, please call [phone].

## 2018-04-02 NOTE — PROGRESS NOTES
REFERRAL TO [address], [place]  [address], [address] [place] [address]    Tele [phone]    Date: Tuesday, April 3rd @2:15PM  Notified pt's care given  Bring picture ID, insurance cards and a list of medications. Arrive 15-30 early.

## 2018-04-02 NOTE — PROGRESS NOTES
HISTORY OF PRESENT ILLNESS  Tory Andrew is a 50 y.o. male. Chest Pain    The history is provided by the patient and significant other. This is a new problem. The current episode started more than 1 week ago. The problem has not changed since onset. The problem occurs hourly. The pain is associated with breathing. The pain is present in the lateral region and left side. The pain is at a severity of 7/10. The quality of the pain is described as pleuritic and sharp. The pain radiates to the mid back. The symptoms are aggravated by deep breathing. Associated symptoms include back pain, cough, exertional chest pressure, hemoptysis, shortness of breath and sputum production. Pertinent negatives include no abdominal pain and no fever. Associated symptoms comments: blood. He has tried nothing for the symptoms. Procedural history includes stress thallium. Cough   This is a chronic (he's not sure) problem. The problem has not changed since onset. Associated symptoms include chest pain and shortness of breath. Pertinent negatives include no abdominal pain. Current Outpatient Prescriptions   Medication Sig Dispense Refill    citalopram (CELEXA) 20 mg tablet TAKE ONE & ONE-HALF TABLETS BY MOUTH ONCE DAILY 135 Tab 1    clopidogrel (PLAVIX) 75 mg tab TAKE ONE TABLET BY MOUTH ONCE DAILY 90 Tab 1    REPATHA SURECLICK pen injection Inject 140 mg under the skin every 2 weeks 1 Pen 10    isosorbide mononitrate ER (IMDUR) 30 mg tablet Take 1 Tab by mouth every morning. TAKE ONE TABLET BY MOUTH IN THE MORNING (Patient taking differently: Take 60 mg by mouth every morning. TAKE ONE TABLET BY MOUTH IN THE MORNING) 90 Tab 3    mirabegron ER (MYRBETRIQ) 50 mg ER tablet Take 50 mg by mouth daily.  metoprolol tartrate (LOPRESSOR) 25 mg tablet Take 0.5 Tabs by mouth nightly. 45 Tab 3    pantoprazole (PROTONIX) 40 mg tablet Take 1 Tab by mouth daily.  90 Tab 3    ranolazine ER (RANEXA) 1,000 mg Take 1 Tab by mouth two (2) times a day. 180 Tab 3    topiramate (TOPAMAX) 50 mg tablet TAKE ONE TABLET BY MOUTH TWICE DAILY 180 Tab 3    gabapentin (NEURONTIN) 300 mg capsule Take 300 mg by mouth two (2) times a day.  aspirin delayed-release 81 mg tablet Take 1 Tab by mouth daily. 90 Tab 3    omega-3 fatty acids-vitamin e (FISH OIL) 1,000 mg cap Take 1 Cap by mouth.  fexofenadine (ALLEGRA) 180 mg tablet Take  by mouth daily. Review of Systems   Constitutional: Negative for fever. Respiratory: Positive for cough, hemoptysis, sputum production, shortness of breath and wheezing. Cardiovascular: Positive for chest pain. Negative for leg swelling. Gastrointestinal: Negative for abdominal pain. Musculoskeletal: Positive for back pain. Neurological: Negative for focal weakness. No new       Physical Exam  Visit Vitals    BP 91/66 (BP 1 Location: Right arm, BP Patient Position: Sitting)    Pulse 63    Temp 96.3 °F (35.7 °C) (Oral)    Resp 16    Ht 5' 10\" (1.778 m)    Wt 265 lb (120.2 kg)    SpO2 100%    BMI 38.02 kg/m2     WD WN male NAD  Heart RRR without murmers clicks or rubs  Lungs CTA  Abdo soft nontender  Ext no edema    ASSESSMENT and PLAN  Encounter Diagnoses   Name Primary?  Chest pain on breathing Yes    Cough     Essential hypertension     Hemiparesis affecting left side as late effect of cerebrovascular accident (CVA) ([facility] [address]      Orders Placed This Encounter    XR CHEST PA LAT    REFERRAL TO CARDIOLOGY    AMB POC EKG ROUTINE W/ 12 LEADS, INTER & REP   Hypertension stable. Likely URI symptoms but will have cardiology take a look at him, may be set him up for a stress test.  Follow-up Disposition:  Return in about 4 weeks (around 4/30/2018) for routine follow up.

## 2018-04-02 NOTE — PROGRESS NOTES
Chief Complaint   Patient presents with    Cough     prod cough blood sputum in AM,  when coughing PM pt can taste blood, fatigued, SOB    Chest Pain     Taking a deep breath pain shart pain to mid chest and goes to back L/side     I have reviewed the patient's medical history in detail and updated the computerized patient record. Health Maintenance reviewed. 1. Have you been to the ER, urgent care clinic since your last visit? Hospitalized since your last visit?no    2. Have you seen or consulted any other health care providers outside of the [address] [place] [address] since your last visit? Include any pap smears or colon screening. No    Fall Risk Assessment, last 12 mths 1/9/2018   Able to walk? Yes   Fall in past 12 months? No   Fall with injury? -   Number of falls in past 12 months -   Fall Risk Score -       PHQ over the last two weeks 1/9/2018   Little interest or pleasure in doing things Several days   Feeling down, depressed or hopeless Several days   Total Score PHQ 2 2   Trouble falling or staying asleep, or sleeping too much -   Feeling tired or having little energy -   Poor appetite or overeating -   Feeling bad about yourself - or that you are a failure or have let yourself or your family down -   Trouble concentrating on things such as school, work, reading or watching TV -   Moving or speaking so slowly that other people could have noticed; or the opposite being so fidgety that others notice -   Thoughts of being better off dead, or hurting yourself in some way -   How difficult have these problems made it for you to do your work, take care of your home and get along with others -       Abuse Screening Questionnaire 1/9/2018   Do you ever feel afraid of your partner? N   Are you in a relationship with someone who physically or mentally threatens you? N   Is it safe for you to go home?  Y       ADL Assessment 1/9/2018   Feeding yourself No Help Needed   Getting from bed to chair No Help Needed   Getting dressed No Help Needed   Bathing or showering No Help Needed   Walk across the room (includes cane/walker) No Help Needed   Using the telphone Help Needed   Taking your medications Help Needed   Preparing meals Help Needed   Managing money (expenses/bills) Help Needed   Moderately strenuous housework (laundry) Help Needed   Shopping for personal items (toiletries/medicines) Help Needed   Shopping for groceries Help Needed   Driving Help Needed   Climbing a flight of stairs Help Needed   Getting to places beyond walking distances Help Needed

## 2018-04-03 ENCOUNTER — OFFICE VISIT (OUTPATIENT)
Dept: CARDIOLOGY CLINIC | Age: 48
End: 2018-04-03

## 2018-04-03 ENCOUNTER — TELEPHONE (OUTPATIENT)
Dept: CARDIOLOGY CLINIC | Age: 48
End: 2018-04-03

## 2018-04-03 VITALS
HEIGHT: 70 IN | DIASTOLIC BLOOD PRESSURE: 80 MMHG | WEIGHT: 266.2 LBS | BODY MASS INDEX: 38.11 KG/M2 | RESPIRATION RATE: 16 BRPM | HEART RATE: 64 BPM | OXYGEN SATURATION: 97 % | SYSTOLIC BLOOD PRESSURE: 120 MMHG

## 2018-04-03 DIAGNOSIS — E78.2 MIXED HYPERLIPIDEMIA: ICD-10-CM

## 2018-04-03 DIAGNOSIS — J40 BRONCHITIS: ICD-10-CM

## 2018-04-03 DIAGNOSIS — R07.89 NON-CARDIAC CHEST PAIN: Primary | ICD-10-CM

## 2018-04-03 RX ORDER — AMOXICILLIN AND CLAVULANATE POTASSIUM 875; 125 MG/1; MG/1
1 TABLET, FILM COATED ORAL 2 TIMES DAILY
Qty: 20 TAB | Refills: 0 | Status: SHIPPED | OUTPATIENT
Start: 2018-04-03 | End: 2018-06-04 | Stop reason: ALTCHOICE

## 2018-04-03 RX ORDER — METHYLPREDNISOLONE 4 MG/1
TABLET ORAL
Qty: 1 DOSE PACK | Refills: 0 | Status: SHIPPED | OUTPATIENT
Start: 2018-04-03 | End: 2018-06-04 | Stop reason: ALTCHOICE

## 2018-04-03 NOTE — PROGRESS NOTES
Debra Denney DNP, ANP-BC  Subjective/HPI:     Ed Johns is a 50 y.o. male is here for symptom based appointment. Patient presented to primary care yesterday for routine visit reporting increasing cough with productive yellow mucus with intermittent blood-tinged sputum, pain with deep inspiration or palpation around the sternal area. Patient also reports increasing fatigue and weakness. Patient denies exertional chest pain. Chest x-ray performed yesterday at [facility] report indicates bronchitis, mild peribronchial cuffing noted about the caleb no confluent infiltrates the mediastinum and bony thorax appear stable. Patient is afebrile today in the office 97.7°F    PCP Provider  Anika Berger MD  Past Medical History:   Diagnosis Date    CAD (coronary artery disease)     GERD (gastroesophageal reflux disease)     Headache(784.0)     Hypertension     Stroke ([address] 2011      Past Surgical History:   Procedure Laterality Date    CARDIAC SURG PROCEDURE UNLIST      HX CORONARY STENT PLACEMENT       Allergies   Allergen Reactions    Crestor [Rosuvastatin] Rash    Iodinated Contrast- Oral And Iv Dye Hives    Statins-Hmg-Coa Reductase Inhibitors Myalgia      Family History   Problem Relation Age of Onset    Heart Disease Mother     Hypertension Mother     Diabetes Father     Heart Disease Father       Current Outpatient Prescriptions   Medication Sig    methylPREDNISolone (MEDROL DOSEPACK) 4 mg tablet As directed.  amoxicillin-clavulanate (AUGMENTIN) 875-125 mg per tablet Take 1 Tab by mouth two (2) times a day.  citalopram (CELEXA) 20 mg tablet TAKE ONE & ONE-HALF TABLETS BY MOUTH ONCE DAILY    clopidogrel (PLAVIX) 75 mg tab TAKE ONE TABLET BY MOUTH ONCE DAILY    REPATHA SURECLICK pen injection Inject 140 mg under the skin every 2 weeks    isosorbide mononitrate ER (IMDUR) 30 mg tablet Take 1 Tab by mouth every morning.  TAKE ONE TABLET BY MOUTH IN THE MORNING (Patient taking differently: Take 60 mg by mouth every morning. TAKE ONE TABLET BY MOUTH IN THE MORNING)    mirabegron ER (MYRBETRIQ) 50 mg ER tablet Take 50 mg by mouth daily.  metoprolol tartrate (LOPRESSOR) 25 mg tablet Take 0.5 Tabs by mouth nightly.  pantoprazole (PROTONIX) 40 mg tablet Take 1 Tab by mouth daily.  ranolazine ER (RANEXA) 1,000 mg Take 1 Tab by mouth two (2) times a day.  topiramate (TOPAMAX) 50 mg tablet TAKE ONE TABLET BY MOUTH TWICE DAILY    gabapentin (NEURONTIN) 300 mg capsule Take 300 mg by mouth two (2) times a day.  aspirin delayed-release 81 mg tablet Take 1 Tab by mouth daily.  omega-3 fatty acids-vitamin e (FISH OIL) 1,000 mg cap Take 1 Cap by mouth.  fexofenadine (ALLEGRA) 180 mg tablet Take  by mouth daily. No current facility-administered medications for this visit. Vitals:    04/03/18 1414   BP: 120/80   Pulse: 64   Resp: (!) 97   Weight: 266 lb 3.2 oz (120.7 kg)   Height: 5' 10\" (1.778 m)     Respiration rate corrected is 16 respirations per minute, SPO2 room air 97%      Social History     Social History    Marital status: SINGLE     Spouse name: N/A    Number of children: 0    Years of education: N/A     Occupational History    disabled Not Employed     Social History Main Topics    Smoking status: Former Smoker     Packs/day: 0.30     Years: 30.00     Types: Cigarettes     Quit date: 9/19/2011    Smokeless tobacco: Never Used    Alcohol use No    Drug use: No    Sexual activity: Not Currently     Other Topics Concern    Not on file     Social History Narrative    Lives on his own friends and relatives look in on him. I have reviewed the nurses notes, vitals, problem list, allergy list, medical history, family, social history and medications. Review of Symptoms:    General: Pt denies excessive weight gain or loss. Pt is able to conduct ADL's  HEENT: Denies blurred vision, headaches, epistaxis and difficulty swallowing.   Respiratory: + Coughing congestion with productive mucus  Cardiovascular: Denies precordial pain, palpitations, edema or PND  Gastrointestinal: Denies poor appetite, indigestion, abdominal pain or blood in stool  Musculoskeletal: Denies pain or swelling from muscles or joints  Neurologic: Denies tremor, paresthesias, or sensory motor disturbance  Skin: Denies rash, itching or texture change. Physical Exam:      General: Well developed, in no acute distress, cooperative and alert  HEENT: No carotid bruits, no JVD, trach is midline. Neck Supple, PEERL, EOM intact. Heart:  Normal S1/S2 negative S3 or S4. Regular, no murmur, gallop or rub to suggest pericarditis. . Patient is tender on the left sternal border. Respiratory: Diminished bilaterally at the bases with scattered rhonchi at the bases, no expiratory wheezing or rales. Abdomen:   Soft, non-tender, no masses, bowel sounds are active.   Extremities:  No edema, normal cap refill, no cyanosis, atraumatic. Neuro: A&Ox3, speech clear, gait stable. Skin: Skin color is normal. No rashes or lesions. Non diaphoretic  Vascular: 2+ pulses symmetric in all extremities    Cardiographics    ECG: Normal sinus rhythm, unchanged from previous tracings.   Results for orders placed or performed during the hospital encounter of 04/24/17   EKG, 12 LEAD, INITIAL   Result Value Ref Range    Ventricular Rate 73 BPM    Atrial Rate 73 BPM    P-R Interval 160 ms    QRS Duration 110 ms    Q-T Interval 418 ms    QTC Calculation (Bezet) 460 ms    Calculated P Axis 19 degrees    Calculated R Axis -46 degrees    Calculated T Axis 62 degrees    Diagnosis       Normal sinus rhythm  Left anterior fascicular block  Nonspecific T wave abnormality  Prolonged QT  Abnormal ECG  When compared with ECG of 31-AUG-2014 15:30,  T wave inversion more evident in Anterior leads  Confirmed by Lilia Castillo MD. ([id number]) on 5/4/2017 6:45:07 AM           Cardiology Labs:  Lab Results   Component Value Date/Time Cholesterol, total 148 08/01/2017 09:54 AM    HDL Cholesterol 35 (L) 08/01/2017 09:54 AM    LDL,Direct 70 07/26/2016 09:43 AM    LDL, calculated 54 08/01/2017 09:54 AM    Triglyceride 296 (H) 08/01/2017 09:54 AM    CHOL/HDL Ratio 5.1 (H) 04/25/2017 12:15 AM       Lab Results   Component Value Date/Time    Sodium 144 08/01/2017 09:54 AM    Potassium 4.3 08/01/2017 09:54 AM    Chloride 105 08/01/2017 09:54 AM    CO2 21 08/01/2017 09:54 AM    Anion gap 11 04/26/2017 05:13 AM    Glucose 98 08/01/2017 09:54 AM    BUN 15 08/01/2017 09:54 AM    Creatinine 1.06 08/01/2017 09:54 AM    BUN/Creatinine ratio 14 08/01/2017 09:54 AM    GFR est AA 95 08/01/2017 09:54 AM    GFR est non-AA 83 08/01/2017 09:54 AM    Calcium 9.0 08/01/2017 09:54 AM    Bilirubin, total 0.3 08/01/2017 09:54 AM    AST (SGOT) 23 08/01/2017 09:54 AM    Alk. phosphatase 57 08/01/2017 09:54 AM    Protein, total 6.7 08/01/2017 09:54 AM    Albumin 4.3 08/01/2017 09:54 AM    Globulin 4.0 04/24/2017 08:30 PM    A-G Ratio 1.8 08/01/2017 09:54 AM    ALT (SGPT) 19 08/01/2017 09:54 AM           Assessment:     Assessment:     Diagnoses and all orders for this visit:    1. Non-cardiac chest pain    2. Mixed hyperlipidemia  -     AMB POC EKG ROUTINE W/ 12 LEADS, INTER & REP    3. Bronchitis    Other orders  -     methylPREDNISolone (MEDROL DOSEPACK) 4 mg tablet; As directed. -     amoxicillin-clavulanate (AUGMENTIN) 875-125 mg per tablet; Take 1 Tab by mouth two (2) times a day. ICD-10-CM ICD-9-CM    1. Non-cardiac chest pain R07.89 786.59    2. Mixed hyperlipidemia E78.2 272.2 AMB POC EKG ROUTINE W/ 12 LEADS, INTER & REP   3. Bronchitis J40 490      Orders Placed This Encounter    AMB POC EKG ROUTINE W/ 12 LEADS, INTER & REP     Order Specific Question:   Reason for Exam:     Answer:   routine    methylPREDNISolone (MEDROL DOSEPACK) 4 mg tablet     Sig: As directed.      Dispense:  1 Dose Pack     Refill:  0    amoxicillin-clavulanate (AUGMENTIN) 875-125 mg per tablet     Sig: Take 1 Tab by mouth two (2) times a day. Dispense:  20 Tab     Refill:  0        Plan:     Patient is a 20-year-old male presenting with productive mucus, cough chest congestion and chest discomfort with deep inspiration/coughing/palpation left anterior chest wall and left sternal area. Chest x-ray does show bronchitis, clinically on exam he has rhonchi in the lower lung fields. There are no cardiac rubs to suggest pericarditis, his EKG is unchanged with normal T-wave pattern. Patient will be treated for acute bronchitis and pleurisy with Augmentin 875 mg twice daily ×10 days and Medrol Dosepak, may use over-the-counter Robitussin-DM as needed for cough suppressant at night. I advised patient/wife to call me in 48 hours to let me know his progress. He already has follow-up planned with primary care in 1 month. Due to the blood-tinged sputum patient will hold aspirin for 2 weeks, maintain Plavix secondary to history of CVA/CAD. Kaylee Barrett NP    This note was created using voice recognition software. Despite editing, there may be syntax errors.

## 2018-04-03 NOTE — TELEPHONE ENCOUNTER
Verified patient with two identifiers. Spoke with Rae Cardoza on HIPAA, pt had xray yesterday @ [address]. Called Radiology dept will fax results.

## 2018-04-03 NOTE — PROGRESS NOTES
1. Have you been to the ER, urgent care clinic since your last visit? Hospitalized since your last visit? No    2. Have you seen or consulted any other health care providers outside of the [address] [place] [address] since your last visit? Include any pap smears or colon screening. No     Patient C/O coughing up thick mucus with red tinge , with deep breathe feels like knife piercing to back ,fatigue. and lower extremities pressure.

## 2018-04-03 NOTE — TELEPHONE ENCOUNTER
----- Message from Merrick Hi NP sent at 4/3/2018  8:57 AM EDT -----  Regarding: chest x ray  Please call patient he has an appt with me this afternoon, he needs to have the CXR his PCP ordered done prior to the visit so I can see the images.

## 2018-05-14 DIAGNOSIS — R05.9 COUGH: ICD-10-CM

## 2018-06-04 ENCOUNTER — OFFICE VISIT (OUTPATIENT)
Dept: INTERNAL MEDICINE CLINIC | Age: 48
End: 2018-06-04

## 2018-06-04 VITALS
WEIGHT: 265 LBS | RESPIRATION RATE: 16 BRPM | DIASTOLIC BLOOD PRESSURE: 63 MMHG | OXYGEN SATURATION: 99 % | BODY MASS INDEX: 37.94 KG/M2 | HEIGHT: 70 IN | HEART RATE: 60 BPM | SYSTOLIC BLOOD PRESSURE: 99 MMHG | TEMPERATURE: 96.3 F

## 2018-06-04 DIAGNOSIS — I69.354 HEMIPARESIS AFFECTING LEFT SIDE AS LATE EFFECT OF CEREBROVASCULAR ACCIDENT (CVA) (HCC): ICD-10-CM

## 2018-06-04 DIAGNOSIS — I10 ESSENTIAL HYPERTENSION: Primary | ICD-10-CM

## 2018-06-04 DIAGNOSIS — K21.9 GASTROESOPHAGEAL REFLUX DISEASE, ESOPHAGITIS PRESENCE NOT SPECIFIED: ICD-10-CM

## 2018-06-04 DIAGNOSIS — Z98.61 S/P PTCA (PERCUTANEOUS TRANSLUMINAL CORONARY ANGIOPLASTY): ICD-10-CM

## 2018-06-04 DIAGNOSIS — I25.10 CORONARY ARTERY DISEASE INVOLVING NATIVE CORONARY ARTERY OF NATIVE HEART WITHOUT ANGINA PECTORIS: ICD-10-CM

## 2018-06-04 RX ORDER — ISOSORBIDE MONONITRATE 30 MG/1
60 TABLET, EXTENDED RELEASE ORAL
Qty: 180 TAB | Refills: 3 | Status: SHIPPED | OUTPATIENT
Start: 2018-06-04 | End: 2019-02-04 | Stop reason: SDUPTHER

## 2018-06-04 RX ORDER — CLOPIDOGREL BISULFATE 75 MG/1
TABLET ORAL
Qty: 90 TAB | Refills: 3 | Status: SHIPPED | OUTPATIENT
Start: 2018-06-04 | End: 2019-06-26 | Stop reason: SDUPTHER

## 2018-06-04 RX ORDER — CITALOPRAM 20 MG/1
TABLET, FILM COATED ORAL
Qty: 135 TAB | Refills: 3 | Status: SHIPPED | OUTPATIENT
Start: 2018-06-04 | End: 2019-06-27 | Stop reason: SDUPTHER

## 2018-06-04 RX ORDER — ISOSORBIDE MONONITRATE 30 MG/1
60 TABLET, EXTENDED RELEASE ORAL
Qty: 60 TAB | Refills: 5 | Status: SHIPPED | OUTPATIENT
Start: 2018-06-04 | End: 2018-06-04 | Stop reason: SDUPTHER

## 2018-06-04 NOTE — PROGRESS NOTES
Chief Complaint   Patient presents with    Chest Pain     follow up     I have reviewed the patient's medical history in detail and updated the computerized patient record. Health Maintenance reviewed. 1. Have you been to the ER, urgent care clinic since your last visit? Hospitalized since your last visit?no    2. Have you seen or consulted any other health care providers outside of the [address] [place] [address] since your last visit? Include any pap smears or colon screening. No    Encouraged pt to discuss pt's wishes with spouse/partner/family and bring them in the next appt to follow thru with the Advanced Directive    Fall Risk Assessment, last 12 mths 1/9/2018   Able to walk? Yes   Fall in past 12 months? No   Fall with injury? -   Number of falls in past 12 months -   Fall Risk Score -       PHQ over the last two weeks 1/9/2018   Little interest or pleasure in doing things Several days   Feeling down, depressed or hopeless Several days   Total Score PHQ 2 2   Trouble falling or staying asleep, or sleeping too much -   Feeling tired or having little energy -   Poor appetite or overeating -   Feeling bad about yourself - or that you are a failure or have let yourself or your family down -   Trouble concentrating on things such as school, work, reading or watching TV -   Moving or speaking so slowly that other people could have noticed; or the opposite being so fidgety that others notice -   Thoughts of being better off dead, or hurting yourself in some way -   How difficult have these problems made it for you to do your work, take care of your home and get along with others -       Abuse Screening Questionnaire 1/9/2018   Do you ever feel afraid of your partner? N   Are you in a relationship with someone who physically or mentally threatens you? N   Is it safe for you to go home?  Y       ADL Assessment 1/9/2018   Feeding yourself No Help Needed   Getting from bed to chair No Help Needed   Getting dressed No Help Needed   Bathing or showering No Help Needed   Walk across the room (includes cane/walker) No Help Needed   Using the telphone Help Needed   Taking your medications Help Needed   Preparing meals Help Needed   Managing money (expenses/bills) Help Needed   Moderately strenuous housework (laundry) Help Needed   Shopping for personal items (toiletries/medicines) Help Needed   Shopping for groceries Help Needed   Driving Help Needed   Climbing a flight of stairs Help Needed   Getting to places beyond walking distances Help Needed

## 2018-06-04 NOTE — MR AVS SNAPSHOT
[address] [address] [facility] 
[phone] Patient: Meliton Ortega MRN: [id number] :[id number] Visit Information Date & Time Provider Department Dept. Phone Encounter #  
 2018  3:15 PM MD Francisco [address] Dr [id number] Follow-up Instructions Return in about 4 months (around 10/4/2018) for routine follow up. Your Appointments 2018  9:15 AM  
ESTABLISHED PATIENT with Celeste Arango MD  
[facility] [address] [place] [address]) [address] [facility]  
[phone] [address] [facility] Upcoming Health Maintenance Date Due Influenza Age 5 to Adult 2018 DTaP/Tdap/Td series (2 - Td) 2025 Allergies as of 2018  Review Complete On: 2018 By: Rajinder Roblero LPN Severity Noted Reaction Type Reactions Crestor [Rosuvastatin]  2014    Rash Iodinated Contrast- Oral And Iv Dye  2013    Hives Statins-hmg-coa Reductase Inhibitors  2016    Myalgia Current Immunizations  Reviewed on 2018 Name Date Influenza Vaccine (Quad) PF 10/9/2017, 2016, 2015 Tdap 2015 Not reviewed this visit You Were Diagnosed With   
  
 Codes Comments Essential hypertension    -  Primary ICD-10-CM: I10 
ICD-9-CM: 401.9 Hemiparesis affecting left side as late effect of cerebrovascular accident (CVA) ([facility] [address]     ICD-10-CM: N43.271 ICD-9-CM: 438.20 S/P PTCA (percutaneous transluminal coronary angioplasty)     ICD-10-CM: Z98.61 ICD-9-CM: V45.82 Coronary artery disease involving native coronary artery of native heart without angina pectoris     ICD-10-CM: I25.10 ICD-9-CM: 414.01 Gastroesophageal reflux disease, esophagitis presence not specified     ICD-10-CM: K21.9 ICD-9-CM: 530.81   
  
 Vitals BP Pulse Temp Resp Height(growth percentile) Weight(growth percentile) 99/63 (BP 1 Location: Right arm, BP Patient Position: Sitting) 60 96.3 °F (35.7 °C) (Oral) 16 5' 10\" (1.778 m) 265 lb (120.2 kg) SpO2 BMI Smoking Status 99% 38.02 kg/m2 Former Smoker Vitals History BMI and BSA Data Body Mass Index Body Surface Area 38.02 kg/m 2 2.44 m 2 Preferred Pharmacy Pharmacy Name Phone [facility] Adriane Lara [phone] Your Updated Medication List  
  
   
This list is accurate as of 6/4/18  4:16 PM.  Always use your most recent med list. ALLEGRA 180 mg tablet Generic drug:  fexofenadine Take  by mouth daily. aspirin delayed-release 81 mg tablet Take 1 Tab by mouth daily. citalopram 20 mg tablet Commonly known as:  CELEXA  
TAKE ONE & ONE-HALF TABLETS BY MOUTH ONCE DAILY  
  
 clopidogrel 75 mg Tab Commonly known as:  PLAVIX TAKE ONE TABLET BY MOUTH ONCE DAILY FISH OIL 1,000 mg Cap Generic drug:  omega-3 fatty acids-vitamin e Take 1 Cap by mouth.  
  
 gabapentin 300 mg capsule Commonly known as:  NEURONTIN Take 300 mg by mouth two (2) times a day. isosorbide mononitrate ER 30 mg tablet Commonly known as:  IMDUR Take 2 Tabs by mouth daily (after lunch). metoprolol tartrate 25 mg tablet Commonly known as:  LOPRESSOR Take 0.5 Tabs by mouth nightly. MYRBETRIQ 50 mg ER tablet Generic drug:  mirabegron ER Take 50 mg by mouth daily. pantoprazole 40 mg tablet Commonly known as:  PROTONIX Take 1 Tab by mouth daily. ranolazine ER 1,000 mg  
Commonly known as:  RANEXA Take 1 Tab by mouth two (2) times a day. REPATHA SURECLICK pen injection Generic drug:  evolocumab Inject 140 mg under the skin every 2 weeks  
  
 topiramate 50 mg tablet Commonly known as:  TOPAMAX TAKE ONE TABLET BY MOUTH TWICE DAILY Prescriptions Sent to Pharmacy Refills  
 clopidogrel (PLAVIX) 75 mg tab 3 Sig: TAKE ONE TABLET BY MOUTH ONCE DAILY Class: Normal  
 Pharmacy: [address] [place], Mehreen Nair Ph #: [phone]  
 citalopram (CELEXA) 20 mg tablet 3 Sig: TAKE ONE & ONE-HALF TABLETS BY MOUTH ONCE DAILY Class: Normal  
 Pharmacy: [address] Ph #: [phone]  
 isosorbide mononitrate ER (IMDUR) 30 mg tablet 3 Sig: Take 2 Tabs by mouth daily (after lunch). Class: Normal  
 Pharmacy: [address] [place], [place] Nair Ph #: [phone] Route: Oral  
  
We Performed the Following CBC W/O DIFF [97401 CPT(R)] COLLECTION VENOUS BLOOD,VENIPUNCTURE [id number] CPT(R)] METABOLIC PANEL, BASIC [02875 CPT(R)] AR HANDLG&/OR CONVEY OF SPEC FOR TR OFFICE TO LAB [45102 CPT(R)] Follow-up Instructions Return in about 4 months (around 10/4/2018) for routine follow up. Introducing \[facility] [place] [facility]\"" & HEALTH SERVICES! Dear Jojo La: Thank you for requesting a [employer] account. Our records indicate that you already have an active [employer].ME account. You can access your account anytime at [email or web address] Did you know that you can access your hospital and ER discharge instructions at any time in [employer]? You can also review all of your test results from your hospital stay or ER visit. Additional Information If you have questions, please visit the Frequently Asked Questions section of the [employer]ME website at [email or web address] Remember, [employer] is NOT to be used for urgent needs. For medical emergencies, dial 911. Now available from your iPhone and Android! Please provide this summary of care documentation to your next provider. Your primary care clinician is listed as Elijah Miller.  If you have any questions after today's visit, please call [phone].

## 2018-06-04 NOTE — LETTER
6/6/2018 8:16 AM 
 
Mr. Tory Andrew [address] [facility] Dear Tory Andrew: Please find your most recent results below. Resulted Orders METABOLIC PANEL, BASIC Result Value Ref Range Glucose 81 65 - 99 mg/dL BUN 8 6 - 24 mg/dL Creatinine 0.96 0.76 - 1.27 mg/dL GFR est non-AA 92 >59 mL/min/1.73 GFR est  >59 mL/min/1.73  
 BUN/Creatinine ratio 8 (L) 9 - 20 Sodium 141 134 - 144 mmol/L Potassium 3.9 3.5 - 5.2 mmol/L Chloride 105 96 - 106 mmol/L  
 CO2 20 18 - 29 mmol/L Comment: **Effective June 11, 2018 Carbon Dioxide, Total** 
  reference interval will be changing to: Age                  Male          Female 
     0 days   - 30 days         16 - 34        16 - 34 
    31 days   -  1 year         15 - 22        15 - 25 
     2 years  -  5 years        16 - 34        14 - 32 
     6 years  - 15 years        23 - 32        22 - 32 
               >12 years        21 - 32        18 - 34 Calcium 8.8 8.7 - 10.2 mg/dL Narrative Performed at:  [address]  [id number] : Farhad Dove MD, Phone:  [phone] CBC W/O DIFF Result Value Ref Range WBC 6.3 3.4 - 10.8 x10E3/uL  
 RBC 4.08 (L) 4.14 - 5.80 x10E6/uL HGB 13.9 13.0 - 17.7 g/dL HCT 40.0 37.5 - 51.0 % MCV 98 (H) 79 - 97 fL  
 MCH 34.1 (H) 26.6 - 33.0 pg  
 MCHC 34.8 31.5 - 35.7 g/dL  
 RDW 14.1 12.3 - 15.4 % PLATELET 156 295 - 956 x10E3/uL Narrative Performed at:  [address]  [id number] : Farhad Dove MD, Phone:  [phone] RECOMMENDATIONS: 
The results of your most recent labs are normal / stable. Please follow up as scheduled. You can also review these results online by using [employer]. Please call me if you have any questions: [phone] Sincerely, Bouchra Jon MD

## 2018-06-05 LAB
BUN SERPL-MCNC: 8 MG/DL (ref 6–24)
BUN/CREAT SERPL: 8 (ref 9–20)
CALCIUM SERPL-MCNC: 8.8 MG/DL (ref 8.7–10.2)
CHLORIDE SERPL-SCNC: 105 MMOL/L (ref 96–106)
CO2 SERPL-SCNC: 20 MMOL/L (ref 18–29)
CREAT SERPL-MCNC: 0.96 MG/DL (ref 0.76–1.27)
ERYTHROCYTE [DISTWIDTH] IN BLOOD BY AUTOMATED COUNT: 14.1 % (ref 12.3–15.4)
GFR SERPLBLD CREATININE-BSD FMLA CKD-EPI: 107 ML/MIN/1.73
GFR SERPLBLD CREATININE-BSD FMLA CKD-EPI: 92 ML/MIN/1.73
GLUCOSE SERPL-MCNC: 81 MG/DL (ref 65–99)
HCT VFR BLD AUTO: 40 % (ref 37.5–51)
HGB BLD-MCNC: 13.9 G/DL (ref 13–17.7)
MCH RBC QN AUTO: 34.1 PG (ref 26.6–33)
MCHC RBC AUTO-ENTMCNC: 34.8 G/DL (ref 31.5–35.7)
MCV RBC AUTO: 98 FL (ref 79–97)
PLATELET # BLD AUTO: 195 X10E3/UL (ref 150–379)
POTASSIUM SERPL-SCNC: 3.9 MMOL/L (ref 3.5–5.2)
RBC # BLD AUTO: 4.08 X10E6/UL (ref 4.14–5.8)
SODIUM SERPL-SCNC: 141 MMOL/L (ref 134–144)
WBC # BLD AUTO: 6.3 X10E3/UL (ref 3.4–10.8)

## 2018-06-06 NOTE — PROGRESS NOTES
Subjective:     Simin Paiz is a 50 y.o. male who presents for follow up of hypertension, hyperlipidemia, history of prior stroke and obesity. Here with SO    New concerns: No c/o, occa dizziness if stands up too fast, got CP when we tried to dec nitro, no bleeding. Current Outpatient Prescriptions   Medication Sig Dispense Refill    clopidogrel (PLAVIX) 75 mg tab TAKE ONE TABLET BY MOUTH ONCE DAILY 90 Tab 3    citalopram (CELEXA) 20 mg tablet TAKE ONE & ONE-HALF TABLETS BY MOUTH ONCE DAILY 135 Tab 3    isosorbide mononitrate ER (IMDUR) 30 mg tablet Take 2 Tabs by mouth daily (after lunch). 180 Tab 3    REPATHA SURECLICK pen injection Inject 140 mg under the skin every 2 weeks 1 Pen 10    mirabegron ER (MYRBETRIQ) 50 mg ER tablet Take 50 mg by mouth daily.  metoprolol tartrate (LOPRESSOR) 25 mg tablet Take 0.5 Tabs by mouth nightly. 45 Tab 3    pantoprazole (PROTONIX) 40 mg tablet Take 1 Tab by mouth daily. 90 Tab 3    ranolazine ER (RANEXA) 1,000 mg Take 1 Tab by mouth two (2) times a day. 180 Tab 3    topiramate (TOPAMAX) 50 mg tablet TAKE ONE TABLET BY MOUTH TWICE DAILY 180 Tab 3    gabapentin (NEURONTIN) 300 mg capsule Take 300 mg by mouth two (2) times a day.  aspirin delayed-release 81 mg tablet Take 1 Tab by mouth daily. 90 Tab 3    omega-3 fatty acids-vitamin e (FISH OIL) 1,000 mg cap Take 1 Cap by mouth.  fexofenadine (ALLEGRA) 180 mg tablet Take  by mouth daily. Allergies   Allergen Reactions    Crestor [Rosuvastatin] Rash    Iodinated Contrast- Oral And Iv Dye Hives    Statins-Hmg-Coa Reductase Inhibitors Myalgia       Diet and Lifestyle: nonsmoker    Cardiovascular ROS: taking medications as instructed, no medication side effects noted, no TIA's, no chest pain on exertion, no dyspnea on exertion, no swelling of ankles. Review of Systems, additional:  Pertinent items are noted in HPI.       Patient Active Problem List    Diagnosis Date Noted    Severe obesity (BMI 35.0-39. 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 ([facility] 05/29/2015    Depression 03/04/2015    Meralgia paresthetica of left side 09/11/2013    CAD (coronary artery disease) 07/13/2012    S/P PTCA (percutaneous transluminal coronary angioplasty) 07/13/2012    Abnormal EKG 06/14/2012    Family history of early CAD 06/14/2012    Dizziness 06/14/2012     Social History   Substance Use Topics    Smoking status: Former Smoker     Packs/day: 0.30     Years: 30.00     Types: Cigarettes     Quit date: 9/19/2011    Smokeless tobacco: Never Used    Alcohol use No        Lab Results  Component Value Date/Time   WBC 6.3 06/04/2018 04:02 PM   HGB 13.9 06/04/2018 04:02 PM   HCT 40.0 06/04/2018 04:02 PM   PLATELET 804 20/66/4353 04:02 PM   MCV 98 (H) 06/04/2018 04:02 PM     Lab Results  Component Value Date/Time   GFR est non-AA 92 06/04/2018 04:02 PM   GFR est  06/04/2018 04:02 PM   Creatinine 0.96 06/04/2018 04:02 PM   BUN 8 06/04/2018 04:02 PM   Sodium 141 06/04/2018 04:02 PM   Potassium 3.9 06/04/2018 04:02 PM   Chloride 105 06/04/2018 04:02 PM   CO2 20 06/04/2018 04:02 PM   Magnesium 1.8 04/26/2017 05:13 AM     Lab Results   Component Value Date/Time    Glucose 81 06/04/2018 04:02 PM    Glucose (POC) 132 (H) 04/25/2017 12:21 AM    Glucose (POC) 119 (H) 07/13/2012 08:15 AM             Objective:     Physical exam significant for the following:     Left sided partial gautam paresis    Visit Vitals    BP 99/63 (BP 1 Location: Right arm, BP Patient Position: Sitting)    Pulse 60    Temp 96.3 °F (35.7 °C) (Oral)    Resp 16    Ht 5' 10\" (1.778 m)    Wt 265 lb (120.2 kg)    SpO2 99%    BMI 38.02 kg/m2     Appearance: alert, well appearing, and in no distress. General exam: CVS exam BP noted to be well controlled today in office, S1, S2 normal, no gallop, no murmur, chest clear, no JVD, no HSM, no edema, neurological exam cranial nerves II through XII intact, abnormal neurological exam unchanged from prior examinations. .   Assessment/Plan:     hypertension stable, hyperlipidemia stable, cerebrovascular disease stable.

## 2018-07-09 ENCOUNTER — TELEPHONE (OUTPATIENT)
Dept: INTERNAL MEDICINE CLINIC | Age: 48
End: 2018-07-09

## 2018-07-10 ENCOUNTER — DOCUMENTATION ONLY (OUTPATIENT)
Dept: INTERNAL MEDICINE CLINIC | Age: 48
End: 2018-07-10

## 2018-07-10 NOTE — PROGRESS NOTES
Chief Complaint   Patient presents with    Prior Corin Ny     Prior Authorization faxed to [facility] and received.

## 2018-07-10 NOTE — PROGRESS NOTES
[facility] for Ranexa ER 1000mg #60 through 7/10/2019  Jayashree Ojeda LPN  2/08/2434  2:30 PM

## 2018-08-07 ENCOUNTER — OFFICE VISIT (OUTPATIENT)
Dept: CARDIOLOGY CLINIC | Age: 48
End: 2018-08-07

## 2018-08-07 VITALS
HEIGHT: 70 IN | BODY MASS INDEX: 38.14 KG/M2 | RESPIRATION RATE: 17 BRPM | WEIGHT: 266.4 LBS | SYSTOLIC BLOOD PRESSURE: 120 MMHG | DIASTOLIC BLOOD PRESSURE: 80 MMHG | HEART RATE: 68 BPM | OXYGEN SATURATION: 97 %

## 2018-08-07 DIAGNOSIS — Z98.61 S/P PTCA (PERCUTANEOUS TRANSLUMINAL CORONARY ANGIOPLASTY): ICD-10-CM

## 2018-08-07 DIAGNOSIS — E78.2 MIXED HYPERLIPIDEMIA: ICD-10-CM

## 2018-08-07 DIAGNOSIS — I63.341 CEREBRAL INFARCTION DUE TO THROMBOSIS OF RIGHT CEREBELLAR ARTERY (HCC): ICD-10-CM

## 2018-08-07 DIAGNOSIS — I25.10 CORONARY ARTERY DISEASE INVOLVING NATIVE CORONARY ARTERY OF NATIVE HEART WITHOUT ANGINA PECTORIS: Primary | ICD-10-CM

## 2018-08-07 DIAGNOSIS — I10 ESSENTIAL HYPERTENSION: ICD-10-CM

## 2018-08-07 NOTE — PROGRESS NOTES
Chief Complaint   Patient presents with    Hypertension     6 month f/u    1. Have you been to the ER, urgent care clinic since your last visit? Hospitalized since your last visit? No    2. Have you seen or consulted any other health care providers outside of the [address] [place] [address] since your last visit? Include any pap smears or colon screening.  No

## 2018-08-07 NOTE — PROGRESS NOTES
8/7/2018 9:52 AM      Subjective:     Efrain Pace is here for f/u visit. Doing very well. He denies chest pain, chest pressure/discomfort, dyspnea, palpitations, irregular heart beats, near-syncope, syncope, fatigue, orthopnea, paroxysmal nocturnal dyspnea, exertional chest pressure/discomfort, claudication, lower extremity edema, tachypnea. Visit Vitals    /80 (BP 1 Location: Right arm, BP Patient Position: Sitting)    Pulse 68    Resp 17    Ht 5' 10\" (1.778 m)    Wt 266 lb 6.4 oz (120.8 kg)    SpO2 97%    BMI 38.22 kg/m2     Current Outpatient Prescriptions   Medication Sig    clopidogrel (PLAVIX) 75 mg tab TAKE ONE TABLET BY MOUTH ONCE DAILY    citalopram (CELEXA) 20 mg tablet TAKE ONE & ONE-HALF TABLETS BY MOUTH ONCE DAILY    isosorbide mononitrate ER (IMDUR) 30 mg tablet Take 2 Tabs by mouth daily (after lunch).  REPATHA SURECLICK pen injection Inject 140 mg under the skin every 2 weeks    mirabegron ER (MYRBETRIQ) 50 mg ER tablet Take 50 mg by mouth daily.  metoprolol tartrate (LOPRESSOR) 25 mg tablet Take 0.5 Tabs by mouth nightly.  pantoprazole (PROTONIX) 40 mg tablet Take 1 Tab by mouth daily.  ranolazine ER (RANEXA) 1,000 mg Take 1 Tab by mouth two (2) times a day.  topiramate (TOPAMAX) 50 mg tablet TAKE ONE TABLET BY MOUTH TWICE DAILY    gabapentin (NEURONTIN) 300 mg capsule Take 300 mg by mouth two (2) times a day.  aspirin delayed-release 81 mg tablet Take 1 Tab by mouth daily.  omega-3 fatty acids-vitamin e (FISH OIL) 1,000 mg cap Take 1 Cap by mouth.  fexofenadine (ALLEGRA) 180 mg tablet Take  by mouth daily. No current facility-administered medications for this visit.           Objective:      Visit Vitals    /80 (BP 1 Location: Right arm, BP Patient Position: Sitting)    Pulse 68    Resp 17    Ht 5' 10\" (1.778 m)    Wt 266 lb 6.4 oz (120.8 kg)    SpO2 97%    BMI 38.22 kg/m2       Data Review:     EKG: Normal sinus rhythm, non specific t changes, no change    Reviewed and/or ordered active problem list, medication list tests    Past Medical History:   Diagnosis Date    CAD (coronary artery disease)     GERD (gastroesophageal reflux disease)     Headache(784.0)     Hypertension     Stroke ([facility] 2011      Past Surgical History:   Procedure Laterality Date    CARDIAC SURG PROCEDURE UNLIST      HX CORONARY STENT PLACEMENT  2012     Allergies   Allergen Reactions    Crestor [Rosuvastatin] Rash    Iodinated Contrast- Oral And Iv Dye Hives    Statins-Hmg-Coa Reductase Inhibitors Myalgia      Family History   Problem Relation Age of Onset    Heart Disease Mother     Hypertension Mother     Diabetes Father     Heart Disease Father       Social History     Social History    Marital status: SINGLE     Spouse name: N/A    Number of children: 0    Years of education: N/A     Occupational History    disabled Not Employed     Social History Main Topics    Smoking status: Former Smoker     Packs/day: 0.30     Years: 30.00     Types: Cigarettes     Quit date: 9/19/2011    Smokeless tobacco: Never Used    Alcohol use No    Drug use: No    Sexual activity: Not Currently     Other Topics Concern    Not on file     Social History Narrative    Lives on his own friends and relatives look in on him. Review of Systems     General: Not Present- Anorexia, Chills, Dietary Changes, Fatigue, Fever, Medication Changes, Night Sweats, Weight Gain > 10lbs. and Weight Loss > 10lbs. .  Skin: Not Present- Bruising and Excessive Sweating. HEENT: Not Present- Headache, Visual Loss and Vertigo. Respiratory: Not Present- Cough, Decreased Exercise Tolerance, Difficulty Breathing, Snoring and Wheezing.   Cardiovascular: Not Present- Abnormal Blood Pressure, Chest Pain, Claudications, Difficulty Breathing On Exertion, Edema, Fainting / Blacking Out, Irregular Heart Beat, Night Cramps, Orthopnea, Palpitations, Paroxysmal Nocturnal Dyspnea, Rapid Heart Rate, Shortness of Breath and Swelling of Extremities. Gastrointestinal: Not Present- Black, Tarry Stool, Bloody Stool, Diarrhea, Hematemesis, Rectal Bleeding and Vomiting. Musculoskeletal: Not Present- Muscle Pain and Muscle Weakness. Neurological: Not Present- Dizziness. Psychiatric: Not Present- Depression. Endocrine: Not Present- Cold Intolerance, Heat Intolerance and Thyroid Problems. Hematology: Not Present- Abnormal Bleeding, Anemia, Blood Clots and Easy Bruising.       Physical Exam   The physical exam findings are as follows:       General   Mental Status - Alert. General Appearance - Not in acute distress. Chest and Lung Exam   Inspection: Accessory muscles - No use of accessory muscles in breathing. Auscultation:   Breath sounds: - Normal.      Cardiovascular   Inspection: Jugular vein - Bilateral - Inspection Normal.  Palpation/Percussion:   Apical Impulse: - Normal.  Auscultation: Rhythm - Regular. Heart Sounds - S1 WNL and S2 WNL. No S3 or S4. Murmurs & Other Heart Sounds: Auscultation of the heart reveals - No Murmurs. Carotid arteries - No Carotid bruit. Peripheral Vascular   Upper Extremity: Inspection - Bilateral - No Cyanotic nailbeds or Digital clubbing. Lower Extremity:   Palpation: Edema - Bilateral - No edema. Assessment:       ICD-10-CM ICD-9-CM    1. Coronary artery disease involving native coronary artery of native heart without angina pectoris I25.10 414.01    2. Essential hypertension I10 401.9 AMB POC EKG ROUTINE W/ 12 LEADS, INTER & REP   3. 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 Mixed hyperlipidemia E78.2 272.2 LIPID PANEL   5. S/P PTCA (percutaneous transluminal coronary angioplasty) Z98.61 V45.82        Plan:     1. CAD: stable. Medical treatment. Normal stress test in 01/2016. Normal LVEF on last Echo 04/2017. 2. Hyperlipidemia: on repatha now. Check FLP.     3. HTN: stable.    4. CVA: f/u with neurology.

## 2018-08-07 NOTE — MR AVS SNAPSHOT
Renetta Hair [address] [facility] 
[phone] Patient: Misael Tracy MRN: [id number] :1970 Visit Information Date & Time Provider Department Dept. Phone Encounter #  
 2018  9:15 AM Laura Coronado, [facility] [phone] [id number] Follow-up Instructions Return in about 6 months (around 2019). Routing History Follow-up and Disposition History Your Appointments 10/1/2018  3:15 PM  
ACUTE CARE with Jerica Freedman MD  
[facility] [address] [place] [address]) Appt Note: f/u on htn /[address] [place] [address]. [address] [facility] [address]  
[phone]  
  
   
 [address] [place] [address] [address]  
  
    
 2019  9:00 AM  
ESTABLISHED PATIENT with Laura Coronado MD  
[facility] [address] [place] [address]) Appt Note: Dr Xuan Grissom  
 [address] [facility]  
[phone] [address] [facility] Upcoming Health Maintenance Date Due Influenza Age 5 to Adult 2018 DTaP/Tdap/Td series (2 - Td) 2025 Allergies as of 2018  Review Complete On: 2018 By: Laura Coronado MD  
  
 Severity Noted Reaction Type Reactions Crestor [Rosuvastatin]  2014    Rash Iodinated Contrast- Oral And Iv Dye  2013    Hives Statins-hmg-coa Reductase Inhibitors  2016    Myalgia Current Immunizations  Reviewed on 2018 Name Date Influenza Vaccine (Quad) PF 10/9/2017, 2016, 2015 Tdap 2015 Not reviewed this visit You Were Diagnosed With   
  
 Codes Comments Coronary artery disease involving native coronary artery of native heart without angina pectoris    -  Primary ICD-10-CM: I25.10 ICD-9-CM: 414.01  Essential hypertension     ICD-10-CM: I10 
 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  
 120/80 (BP 1 Location: Right arm, BP Patient Position: Sitting) 68 17 5' 10\" (1.778 m) 266 lb 6.4 oz (120.8 kg) 97% BMI Smoking Status 38.22 kg/m2 Former Smoker Vitals History BMI and BSA Data Body Mass Index Body Surface Area  
 38.22 kg/m 2 2.44 m 2 Preferred Pharmacy Pharmacy Name Phone [facility] Adriane Lara [phone] Your Updated Medication List  
  
   
This list is accurate as of 8/7/18  9:54 AM.  Always use your most recent med list. ALLEGRA 180 mg tablet Generic drug:  fexofenadine Take  by mouth daily. aspirin delayed-release 81 mg tablet Take 1 Tab by mouth daily. citalopram 20 mg tablet Commonly known as:  CELEXA  
TAKE ONE & ONE-HALF TABLETS BY MOUTH ONCE DAILY  
  
 clopidogrel 75 mg Tab Commonly known as:  PLAVIX TAKE ONE TABLET BY MOUTH ONCE DAILY FISH OIL 1,000 mg Cap Generic drug:  omega-3 fatty acids-vitamin e Take 1 Cap by mouth.  
  
 gabapentin 300 mg capsule Commonly known as:  NEURONTIN Take 300 mg by mouth two (2) times a day. isosorbide mononitrate ER 30 mg tablet Commonly known as:  IMDUR Take 2 Tabs by mouth daily (after lunch). metoprolol tartrate 25 mg tablet Commonly known as:  LOPRESSOR Take 0.5 Tabs by mouth nightly. MYRBETRIQ 50 mg ER tablet Generic drug:  mirabegron ER Take 50 mg by mouth daily. pantoprazole 40 mg tablet Commonly known as:  PROTONIX Take 1 Tab by mouth daily.   
  
 ranolazine ER 1,000 mg  
 Commonly known as:  RANEXA Take 1 Tab by mouth two (2) times a day. REPATHA SURECLICK pen injection Generic drug:  evolocumab Inject 140 mg under the skin every 2 weeks  
  
 topiramate 50 mg tablet Commonly known as:  TOPAMAX TAKE ONE TABLET BY MOUTH TWICE DAILY We Performed the Following AMB POC EKG ROUTINE W/ 12 LEADS, INTER & REP [49137 CPT(R)] LIPID PANEL [40719 CPT(R)] Follow-up Instructions Return in about 6 months (around 2/7/2019). Introducing [facility]! Dear Ankur Blackwell: Thank you for requesting a [employer] account. Our records indicate that you already have an active [employer] account. You can access your account anytime at [email or web address] Did you know that you can access your hospital and ER discharge instructions at any time in [employer]? You can also review all of your test results from your hospital stay or ER visit. Additional Information If you have questions, please visit the Frequently Asked Questions section of the [employer] website at [email or web address] Remember, [employer] is NOT to be used for urgent needs. For medical emergencies, dial 911. Now available from your iPhone and Android! Please provide this summary of care documentation to your next provider. Your primary care clinician is listed as Hoa Peñaloza. If you have any questions after today's visit, please call [phone].

## 2018-08-08 LAB
CHOLEST SERPL-MCNC: 155 MG/DL (ref 100–199)
HDLC SERPL-MCNC: 41 MG/DL
INTERPRETATION, 910389: NORMAL
LDLC SERPL CALC-MCNC: 67 MG/DL (ref 0–99)
TRIGL SERPL-MCNC: 237 MG/DL (ref 0–149)
VLDLC SERPL CALC-MCNC: 47 MG/DL (ref 5–40)

## 2018-08-10 ENCOUNTER — TELEPHONE (OUTPATIENT)
Dept: CARDIOLOGY CLINIC | Age: 48
End: 2018-08-10

## 2018-08-10 NOTE — TELEPHONE ENCOUNTER
----- Message from Kevin Orona MD sent at 8/8/2018  3:33 PM EDT -----  Inform him labs are k      Called pt and left message to call me back.

## 2018-08-13 NOTE — TELEPHONE ENCOUNTER
Returned [employer] [facility], on HIPPA form, advised her his labs were okay. She advised that she did see them on line and advised the pt. She advised that pt's Ranexa has been approved until 2019. She then advised that his Repatha is coming up for prior auth again in August. She advised she would call back and let me know how many refills pt has left on his Repatha. She verbalized understanding.

## 2018-09-04 ENCOUNTER — TELEPHONE (OUTPATIENT)
Dept: CARDIOLOGY CLINIC | Age: 48
End: 2018-09-04

## 2018-09-04 NOTE — TELEPHONE ENCOUNTER
Called for status of repatha pre-authorization current one is expiring on 9/11/18    Please update insurance plan to renew it.     thanks

## 2018-09-11 ENCOUNTER — OFFICE VISIT (OUTPATIENT)
Dept: INTERNAL MEDICINE CLINIC | Age: 48
End: 2018-09-11

## 2018-09-11 VITALS
SYSTOLIC BLOOD PRESSURE: 98 MMHG | OXYGEN SATURATION: 97 % | WEIGHT: 265 LBS | HEART RATE: 69 BPM | RESPIRATION RATE: 16 BRPM | HEIGHT: 70 IN | DIASTOLIC BLOOD PRESSURE: 67 MMHG | BODY MASS INDEX: 37.94 KG/M2 | TEMPERATURE: 96.9 F

## 2018-09-11 DIAGNOSIS — I69.354 HEMIPARESIS AFFECTING LEFT SIDE AS LATE EFFECT OF CEREBROVASCULAR ACCIDENT (CVA) (HCC): ICD-10-CM

## 2018-09-11 DIAGNOSIS — J01.00 SUBACUTE MAXILLARY SINUSITIS: Primary | ICD-10-CM

## 2018-09-11 RX ORDER — AZITHROMYCIN 250 MG/1
250 TABLET, FILM COATED ORAL SEE ADMIN INSTRUCTIONS
Qty: 6 TAB | Refills: 0 | Status: SHIPPED | OUTPATIENT
Start: 2018-09-11 | End: 2018-10-01 | Stop reason: ALTCHOICE

## 2018-09-11 NOTE — PROGRESS NOTES
Subjective:   King Nava is a 50 y.o. male who complains of sore throat, cough described as productive of creamy sputum, headache, chills and dyspnea for > 10 days days, unchanged since that time. He denies a history of fevers and vomiting. C/o burning CP  Evaluation to date: none. Treatment to date: OTC products. Patient does not smoke cigarettes. Relevant PMH: CVA, CAD  . No new focal weakness    Allergies   Allergen Reactions    Crestor [Rosuvastatin] Rash    Iodinated Contrast- Oral And Iv Dye Hives    Statins-Hmg-Coa Reductase Inhibitors Myalgia         Social History   Substance Use Topics    Smoking status: Former Smoker     Packs/day: 0.30     Years: 30.00     Types: Cigarettes     Quit date: 9/19/2011    Smokeless tobacco: Never Used    Alcohol use No        Review of Systems  Pertinent items are noted in HPI. Objective:     Visit Vitals    BP 98/67 (BP 1 Location: Right arm, BP Patient Position: Sitting)    Pulse 69    Temp 96.9 °F (36.1 °C) (Oral)    Resp 16    Ht 5' 10\" (1.778 m)    Wt 265 lb (120.2 kg)    SpO2 97%    BMI 38.02 kg/m2     General:  alert, fatigued, cooperative, no distress   Eyes: negative   Ears: normal TM's and external ear canals AU   Sinuses: Normal paranasal sinuses without tenderness   Mouth:  Lips, mucosa, and tongue normal. Teeth and gums normal   Neck: supple, symmetrical, trachea midline and no adenopathy. Heart: S1 and S2 normal, no murmurs noted. Lungs: clear to auscultation bilaterally   Abdomen: soft, non-tender. Bowel sounds normal. No masses,  no organomegaly    left ext in a sling    Assessment/Plan:   Sinusitis, maybe viral but Sx > 2 weeks will give AB  Suggested symptomatic OTC remedies. Antibiotics per orders. RTC prn. Encounter Diagnoses   Name Primary?     Subacute maxillary sinusitis Yes    Hemiparesis affecting left side as late effect of cerebrovascular accident (CVA) ([facility]      Orders Placed This Encounter    azithromycin (ZITHROMAX) 250 mg tablet   . Discussed possible side affects, precautions, and drug interactions and possible benefits of the medication(s). Follow-up Disposition:  Return if symptoms worsen or fail to improve.

## 2018-09-11 NOTE — PROGRESS NOTES
Chief Complaint   Patient presents with    Cough     prod cough thick yellow sputum, burning in chest after coughing pt feels like he is going to pass out and pain in head, SOB, not sleeping, chest conjestion,      I have reviewed the patient's medical history in detail and updated the computerized patient record. Health Maintenance reviewed. 1. Have you been to the ER, urgent care clinic since your last visit? Hospitalized since your last visit?no    2. Have you seen or consulted any other health care providers outside of the [address] [place] [address] since your last visit? Include any pap smears or colon screening. No    Encouraged pt to discuss pt's wishes with spouse/partner/family and bring them in the next appt to follow thru with the Advanced Directive    Fall Risk Assessment, last 12 mths 1/9/2018   Able to walk? Yes   Fall in past 12 months? No   Fall with injury? -   Number of falls in past 12 months -   Fall Risk Score -       PHQ over the last two weeks 1/9/2018   Little interest or pleasure in doing things Several days   Feeling down, depressed, irritable, or hopeless Several days   Total Score PHQ 2 2   Trouble falling or staying asleep, or sleeping too much -   Feeling tired or having little energy -   Poor appetite, weight loss, or overeating -   Feeling bad about yourself - or that you are a failure or have let yourself or your family down -   Trouble concentrating on things such as school, work, reading, or watching TV -   Moving or speaking so slowly that other people could have noticed; or the opposite being so fidgety that others notice -   Thoughts of being better off dead, or hurting yourself in some way -   How difficult have these problems made it for you to do your work, take care of your home and get along with others -       Abuse Screening Questionnaire 1/9/2018   Do you ever feel afraid of your partner?  N   Are you in a relationship with someone who physically or mentally threatens you? N   Is it safe for you to go home?  Y       ADL Assessment 1/9/2018   Feeding yourself No Help Needed   Getting from bed to chair No Help Needed   Getting dressed No Help Needed   Bathing or showering No Help Needed   Walk across the room (includes cane/walker) No Help Needed   Using the telphone Help Needed   Taking your medications Help Needed   Preparing meals Help Needed   Managing money (expenses/bills) Help Needed   Moderately strenuous housework (laundry) Help Needed   Shopping for personal items (toiletries/medicines) Help Needed   Shopping for groceries Help Needed   Driving Help Needed   Climbing a flight of stairs Help Needed   Getting to places beyond walking distances Help Needed

## 2018-09-11 NOTE — MR AVS SNAPSHOT
[address] 1437 LTAC, located within [facility] 
[phone] Patient: Javier Bowman MRN: [id number] :1970 Visit Information Date & Time Provider Department Dept. Phone Encounter #  
 2018  1:30 PM Mabel Arshad MD [facility] [id number] Follow-up Instructions Return if symptoms worsen or fail to improve. Your Appointments 10/1/2018  3:15 PM  
ACUTE CARE with Mabel Arshad MD  
[facility] [address] [place] [address]) Appt Note: f/u on htn /[address] [place] [address]  
[phone]  
  
   
 [address] [place] [address] [address]  
  
    
 2019  9:00 AM  
ESTABLISHED PATIENT with Mark Wyman MD  
[facility] [address] [place] [address]) Appt Note: Dr Ana Rojas  
 [address]  
[phone] [address] Upcoming Health Maintenance Date Due Influenza Age 5 to Adult 2018 DTaP/Tdap/Td series (2 - Td) 2025 Allergies as of 2018  Review Complete On: 2018 By: Mabel Arshad MD  
  
 Severity Noted Reaction Type Reactions Crestor [Rosuvastatin]  2014    Rash Iodinated Contrast- Oral And Iv Dye  2013    Hives Statins-hmg-coa Reductase Inhibitors  2016    Myalgia Current Immunizations  Reviewed on 2018 Name Date Influenza Vaccine (Quad) PF 10/9/2017, 2016, 2015 Tdap 2015 Not reviewed this visit You Were Diagnosed With   
  
 Codes Comments Subacute maxillary sinusitis    -  Primary ICD-10-CM: J01.00 ICD-9-CM: 461.0 Hemiparesis affecting left side as late effect of cerebrovascular accident (CVA) ([facility] [address]     ICD-10-CM: O73.955 ICD-9-CM: 438.20 Vitals BP Pulse Temp Resp Height(growth percentile) Weight(growth percentile) 98/67 (BP 1 Location: Right arm, BP Patient Position: Sitting) 69 96.9 °F (36.1 °C) (Oral) 16 5' 10\" (1.778 m) 265 lb (120.2 kg) SpO2 BMI Smoking Status 97% 38.02 kg/m2 Former Smoker BMI and BSA Data Body Mass Index Body Surface Area 38.02 kg/m 2 2.44 m 2 Preferred Pharmacy Pharmacy Name Phone [facility] [place] Adriane Lara [phone] Your Updated Medication List  
  
   
This list is accurate as of 9/11/18  2:10 PM.  Always use your most recent med list. ALLEGRA 180 mg tablet Generic drug:  fexofenadine Take  by mouth daily. aspirin delayed-release 81 mg tablet Take 1 Tab by mouth daily. azithromycin 250 mg tablet Commonly known as:  Joamrlon Jann Take 1 Tab by mouth See Admin Instructions. Take two tablets today then one tablet daily for next 4 days  
  
 citalopram 20 mg tablet Commonly known as:  CELEXA  
TAKE ONE & ONE-HALF TABLETS BY MOUTH ONCE DAILY  
  
 clopidogrel 75 mg Tab Commonly known as:  PLAVIX TAKE ONE TABLET BY MOUTH ONCE DAILY FISH OIL 1,000 mg Cap Generic drug:  omega-3 fatty acids-vitamin e Take 1 Cap by mouth.  
  
 gabapentin 300 mg capsule Commonly known as:  NEURONTIN Take 300 mg by mouth two (2) times a day. isosorbide mononitrate ER 30 mg tablet Commonly known as:  IMDUR Take 2 Tabs by mouth daily (after lunch). metoprolol tartrate 25 mg tablet Commonly known as:  LOPRESSOR Take 0.5 Tabs by mouth nightly. MYRBETRIQ 50 mg ER tablet Generic drug:  mirabegron ER Take 50 mg by mouth daily. pantoprazole 40 mg tablet Commonly known as:  PROTONIX Take 1 Tab by mouth daily. ranolazine ER 1,000 mg  
Commonly known as:  RANEXA Take 1 Tab by mouth two (2) times a day. REPATHA SURECLICK pen injection Generic drug:  evolocumab Inject 140 mg under the skin every 2 weeks  
  
 topiramate 50 mg tablet Commonly known as:  TOPAMAX TAKE ONE TABLET BY MOUTH TWICE DAILY Prescriptions Sent to Pharmacy Refills  
 azithromycin (ZITHROMAX) 250 mg tablet 0 Sig: Take 1 Tab by mouth See Admin Instructions. Take two tablets today then one tablet daily for next 4 days Class: Normal  
 Pharmacy: [facility] DR SYED RUIZUBALDOLUNA [facility] Carley Nair Ph #: [phone] Route: Oral  
  
Follow-up Instructions Return if symptoms worsen or fail to improve. Introducing [facility]! Dear Chuck Au: Thank you for requesting a [employer] account. Our records indicate that you already have an active [employer] account. You can access your account anytime at [email or web address] Did you know that you can access your hospital and ER discharge instructions at any time in [employer]? You can also review all of your test results from your hospital stay or ER visit. Additional Information If you have questions, please visit the Frequently Asked Questions section of the [employer] website at [email or web address] Remember, [employer] is NOT to be used for urgent needs. For medical emergencies, dial 911. Now available from your iPhone and Android! Please provide this summary of care documentation to your next provider. Your primary care clinician is listed as India Hui. If you have any questions after today's visit, please call [phone].

## 2018-09-14 NOTE — TELEPHONE ENCOUNTER
Went on cover my meds and did the prior auth on pt's Repatha. Filled out all info. They advised it was sent into [facility]. Will wait to hear from them.

## 2018-09-17 NOTE — TELEPHONE ENCOUNTER
Received prior auth on [employer] Farida has been approved from 9/14/18-3/15/19. Will contact pt today to advise. Tried to call Becka Anna on [employer], but phone was not working. No one picked up. Will try tomorrow. Left message for Becka Castillo on [employer] and advised her that pt's Zelpha Jennings has been approved from 9/14/18-3/15/19. Advised her to call back if anything else is needed.

## 2018-09-28 RX ORDER — METOPROLOL TARTRATE 25 MG/1
TABLET, FILM COATED ORAL
Qty: 90 TAB | Refills: 1 | Status: SHIPPED | OUTPATIENT
Start: 2018-09-28 | End: 2019-02-04 | Stop reason: SDUPTHER

## 2018-10-01 ENCOUNTER — OFFICE VISIT (OUTPATIENT)
Dept: INTERNAL MEDICINE CLINIC | Age: 48
End: 2018-10-01

## 2018-10-01 VITALS
TEMPERATURE: 96.5 F | DIASTOLIC BLOOD PRESSURE: 74 MMHG | BODY MASS INDEX: 38.08 KG/M2 | HEART RATE: 66 BPM | WEIGHT: 266 LBS | SYSTOLIC BLOOD PRESSURE: 112 MMHG | HEIGHT: 70 IN | OXYGEN SATURATION: 99 % | RESPIRATION RATE: 16 BRPM

## 2018-10-01 DIAGNOSIS — I25.10 CORONARY ARTERY DISEASE INVOLVING NATIVE CORONARY ARTERY OF NATIVE HEART WITHOUT ANGINA PECTORIS: ICD-10-CM

## 2018-10-01 DIAGNOSIS — Z23 ENCOUNTER FOR IMMUNIZATION: ICD-10-CM

## 2018-10-01 DIAGNOSIS — I69.354 HEMIPARESIS AFFECTING LEFT SIDE AS LATE EFFECT OF CEREBROVASCULAR ACCIDENT (CVA) (HCC): ICD-10-CM

## 2018-10-01 DIAGNOSIS — I10 ESSENTIAL HYPERTENSION: Primary | ICD-10-CM

## 2018-10-01 RX ORDER — PANTOPRAZOLE SODIUM 40 MG/1
40 TABLET, DELAYED RELEASE ORAL DAILY
Qty: 90 TAB | Refills: 3 | Status: SHIPPED | OUTPATIENT
Start: 2018-10-01 | End: 2019-10-14 | Stop reason: SDUPTHER

## 2018-10-01 RX ORDER — RANOLAZINE 1000 MG/1
1000 TABLET, EXTENDED RELEASE ORAL 2 TIMES DAILY
Qty: 180 TAB | Refills: 3 | Status: SHIPPED | OUTPATIENT
Start: 2018-10-01 | End: 2019-10-14 | Stop reason: SDUPTHER

## 2018-10-01 RX ORDER — TOPIRAMATE 50 MG/1
TABLET, FILM COATED ORAL
Qty: 180 TAB | Refills: 3 | Status: SHIPPED | OUTPATIENT
Start: 2018-10-01 | End: 2019-10-14 | Stop reason: SDUPTHER

## 2018-10-01 NOTE — MR AVS SNAPSHOT
[address] [address] [facility] 
[phone] Patient: Lizz Gupta MRN: [id number] :1970 Visit Information Date & Time Provider Department Dept. Phone Encounter #  
 10/1/2018  3:15 PM Allyson Sellers  [facility] [id number] Follow-up Instructions Return in about 4 months (around 2019) for routine follow up. Your Appointments 2019  9:00 AM  
ESTABLISHED PATIENT with Daisy Patiño MD  
[facility]) Appt Note: Dr Jj Stephenson  
 [address]  
[phone] [address] Upcoming Health Maintenance Date Due Influenza Age 5 to Adult 2018 DTaP/Tdap/Td series (2 - Td) 2025 Allergies as of 10/1/2018  Review Complete On: 10/1/2018 By: Allyson Sellers MD  
  
 Severity Noted Reaction Type Reactions Crestor [Rosuvastatin]  2014    Rash Iodinated Contrast- Oral And Iv Dye  2013    Hives Statins-hmg-coa Reductase Inhibitors  2016    Myalgia Current Immunizations  Reviewed on 2018 Name Date Influenza Vaccine (Quad) PF 10/9/2017, 2016, 2015 Influenza Vaccine PF  Incomplete Tdap 2015 Not reviewed this visit You Were Diagnosed With   
  
 Codes Comments Essential hypertension    -  Primary ICD-10-CM: I10 
ICD-9-CM: 401.9 Hemiparesis affecting left side as late effect of cerebrovascular accident (CVA) ([facility] [address]     ICD-10-CM: W09.889 ICD-9-CM: 438.20 Encounter for immunization     ICD-10-CM: H00 ICD-9-CM: V03.89 Coronary artery disease involving native coronary artery of native heart without angina pectoris     ICD-10-CM: I25.10 ICD-9-CM: 414.01 Vitals BP Pulse Temp Resp Height(growth percentile) Weight(growth percentile) 112/74 (BP 1 Location: Right arm, BP Patient Position: Sitting) 66 96.5 °F (35.8 °C) (Oral) 16 5' 10\" (1.778 m) 266 lb (120.7 kg) SpO2 BMI Smoking Status 99% 38.17 kg/m2 Former Smoker BMI and BSA Data Body Mass Index Body Surface Area  
 38.17 kg/m 2 2.44 m 2 Preferred Pharmacy Pharmacy Name Phone [facility] Adriane Lara [phone] Your Updated Medication List  
  
   
This list is accurate as of 10/1/18  3:45 PM.  Always use your most recent med list. ALLEGRA 180 mg tablet Generic drug:  fexofenadine Take  by mouth daily. aspirin delayed-release 81 mg tablet Take 1 Tab by mouth daily. citalopram 20 mg tablet Commonly known as:  CELEXA  
TAKE ONE & ONE-HALF TABLETS BY MOUTH ONCE DAILY  
  
 clopidogrel 75 mg Tab Commonly known as:  PLAVIX TAKE ONE TABLET BY MOUTH ONCE DAILY FISH OIL 1,000 mg Cap Generic drug:  omega-3 fatty acids-vitamin e Take 1 Cap by mouth.  
  
 gabapentin 300 mg capsule Commonly known as:  NEURONTIN Take 300 mg by mouth two (2) times a day. isosorbide mononitrate ER 30 mg tablet Commonly known as:  IMDUR Take 2 Tabs by mouth daily (after lunch). metoprolol tartrate 25 mg tablet Commonly known as:  LOPRESSOR  
TAKE ONE-HALF TABLET BY MOUTH TWICE DAILY MYRBETRIQ 50 mg ER tablet Generic drug:  mirabegron ER Take 50 mg by mouth daily. pantoprazole 40 mg tablet Commonly known as:  PROTONIX Take 1 Tab by mouth daily. ranolazine ER 1,000 mg  
Commonly known as:  RANEXA Take 1 Tab by mouth two (2) times a day. REPATHA SURECLICK pen injection Generic drug:  evolocumab Inject 140 mg under the skin every 2 weeks  
  
 topiramate 50 mg tablet Commonly known as:  TOPAMAX TAKE ONE TABLET BY MOUTH TWICE DAILY Prescriptions Sent to Pharmacy Refills pantoprazole (PROTONIX) 40 mg tablet 3 Sig: Take 1 Tab by mouth daily. Class: Normal  
 Pharmacy: [address] [place], [address] Nair Ph #: [phone] Route: Oral  
 ranolazine ER (RANEXA) 1,000 mg 3 Sig: Take 1 Tab by mouth two (2) times a day. Class: Normal  
 Pharmacy: [address] [place], [address] Nair Ph #: [phone] Route: Oral  
 topiramate (TOPAMAX) 50 mg tablet 3 Sig: TAKE ONE TABLET BY MOUTH TWICE DAILY Class: Normal  
 Pharmacy: [address] [place], [address] Nair Ph #: [phone] We Performed the Following INFLUENZA VIRUS VACCINE, PRESERVATIVE FREE SYRINGE, 3 YRS AND OLDER [13036 CPT(R)] Follow-up Instructions Return in about 4 months (around 2/1/2019) for routine follow up. Introducing [address]! Dear Lester Hagen: Thank you for requesting a [employer] account. Our records indicate that you already have an active [employer] account. You can access your account anytime at [email or web address] Did you know that you can access your hospital and ER discharge instructions at any time in [employer]? You can also review all of your test results from your hospital stay or ER visit. Additional Information If you have questions, please visit the Frequently Asked Questions section of the [employer] website at [email or web address] Remember, [employer] is NOT to be used for urgent needs. For medical emergencies, dial 911. Now available from your iPhone and Android! Please provide this summary of care documentation to your next provider. Your primary care clinician is listed [facility]. If you have any questions after today's visit, please call [phone].

## 2018-10-01 NOTE — PROGRESS NOTES
Subjective:     Len Baer is a 50 y.o. male who presents for follow up of hypertension, hyperlipidemia, coronary artery disease and recent sinus infection. CVA no new Sx Here with SO. New concerns: finished Zpack coughing had improved greatly    Allergies   Allergen Reactions    Crestor [Rosuvastatin] Rash    Iodinated Contrast- Oral And Iv Dye Hives    Statins-Hmg-Coa Reductase Inhibitors Myalgia       Diet and Lifestyle: nonsmoker    Cardiovascular ROS: taking medications as instructed, no medication side effects noted, no TIA's, no chest pain on exertion, notes stable dyspnea on exertion, no change, no swelling of ankles. Review of Systems, additional:  Pertinent items are noted in HPI. Patient Active Problem List    Diagnosis Date Noted    Severe obesity (BMI 35.0-39. 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 ([address] 05/29/2015    Depression 03/04/2015    Meralgia paresthetica of left side 09/11/2013    CAD (coronary artery disease) 07/13/2012    S/P PTCA (percutaneous transluminal coronary angioplasty) 07/13/2012    Abnormal EKG 06/14/2012    Family history of early CAD 06/14/2012    Dizziness 06/14/2012       Social History   Substance Use Topics    Smoking status: Former Smoker     Packs/day: 0.30     Years: 30.00     Types: Cigarettes     Quit date: 9/19/2011    Smokeless tobacco: Never Used    Alcohol use No        Lab Results  Component Value Date/Time   Cholesterol, total 155 08/07/2018 10:08 AM   HDL Cholesterol 41 08/07/2018 10:08 AM   LDL,Direct 70 07/26/2016 09:43 AM   LDL, calculated 67 08/07/2018 10:08 AM   Triglyceride 237 (H) 08/07/2018 10:08 AM   CHOL/HDL Ratio 5.1 (H) 04/25/2017 12:15 AM     Lab Results  Component Value Date/Time   GFR est non-AA 92 06/04/2018 04:02 PM   GFR est  06/04/2018 04:02 PM   Creatinine 0.96 06/04/2018 04:02 PM   BUN 8 06/04/2018 04:02 PM   Sodium 141 06/04/2018 04:02 PM   Potassium 3.9 06/04/2018 04:02 PM   Chloride 105 06/04/2018 04:02 PM   CO2 20 06/04/2018 04:02 PM   Magnesium 1.8 04/26/2017 05:13 AM     Lab Results   Component Value Date/Time    Glucose 81 06/04/2018 04:02 PM    Glucose (POC) 132 (H) 04/25/2017 12:21 AM    Glucose (POC) 119 (H) 07/13/2012 08:15 AM             Objective:     Physical exam significant for the following:     Partial left hemiparesis    Visit Vitals    /74 (BP 1 Location: Right arm, BP Patient Position: Sitting)    Pulse 66    Temp 96.5 °F (35.8 °C) (Oral)    Resp 16    Ht 5' 10\" (1.778 m)    Wt 266 lb (120.7 kg)    SpO2 99%    BMI 38.17 kg/m2     Appearance: alert, well appearing, and in no distress. General exam: CVS exam BP noted to be well controlled today in office, S1, S2 normal, no gallop, no murmur, chest clear, no JVD, no HSM, no edema. . Left upper ext in a sling    Assessment/Plan:     hypertension stable, hyperlipidemia stable. Need for immunization. CAD stable. Sinus infection recovered. ICD-10-CM ICD-9-CM    1. Essential hypertension I10 401.9 TN IMMUNIZ ADMIN,1 SINGLE/COMB VAC/TOXOID   2. Hemiparesis affecting left side as late effect of cerebrovascular accident (CV[facility]) ([facility] I69.354 438.20 TN IMMUNIZ ADMIN,1 SINGLE/COMB VAC/TOXOID   3. Encounter for immunization Z23 V03.89 INFLUENZA VIRUS VACCINE, PRESERVATIVE FREE SYRINGE, 3 YRS AND OLDER      TN IMMUNIZ ADMIN,1 SINGLE/COMB VAC/TOXOID   4.  Coronary artery disease involving native coronary artery of native heart without angina pectoris I25.10 414.01 AR IMMUNIZ ADMIN,1 SINGLE/COMB VAC/TOXOID     Orders Placed This Encounter    AR IMMUNIZ ADMIN,1 SINGLE/COMB VAC/TOXOID    INFLUENZA VIRUS VACCINE, PRESERVATIVE FREE SYRINGE, 3 YRS AND OLDER    pantoprazole (PROTONIX) 40 mg tablet     Sig: Take 1 Tab by mouth daily. Dispense:  90 Tab     Refill:  3    ranolazine ER (RANEXA) 1,000 mg     Sig: Take 1 Tab by mouth two (2) times a day. Dispense:  180 Tab     Refill:  3    topiramate (TOPAMAX) 50 mg tablet     Sig: TAKE ONE TABLET BY MOUTH TWICE DAILY     Dispense:  180 Tab     Refill:  3     Chronic Conditions Addressed Today     1. CAD (coronary artery disease)     Overview      7/12/12 s/p LAD PCI:            Relevant Medications     ranolazine ER (RANEXA) 1,000 mg     Other Relevant Orders     AR IMMUNIZ ADMIN,1 SINGLE/COMB VAC/TOXOID    2. Hemiparesis affecting left side as late effect of cerebrovascular accident (CVA) ([facility] [address]     Relevant Medications     topiramate (TOPAMAX) 50 mg tablet     Other Relevant Orders     AR IMMUNIZ ADMIN,1 SINGLE/COMB VAC/TOXOID    3.  Essential hypertension - Primary     Relevant Medications     ranolazine ER (RANEXA) 1,000 mg     Other Relevant Orders     AR IMMUNIZ ADMIN,1 SINGLE/COMB VAC/TOXOID      Acute Diagnoses Addressed Today     Encounter for immunization            Relevant Orders        INFLUENZA VIRUS VACCINE, PRESERVATIVE FREE SYRINGE, 3 YRS AND OLDER (Completed)        AR IMMUNIZ ADMIN,1 SINGLE/COMB VAC/TOXOID

## 2018-10-01 NOTE — PROGRESS NOTES
Chief Complaint   Patient presents with    Cough     follow up     I have reviewed the patient's medical history in detail and updated the computerized patient record. Health Maintenance reviewed. 1. Have you been to the ER, urgent care clinic since your last visit? Hospitalized since your last visit?no    2. Have you seen or consulted any other health care providers outside of the [address] [place] [address] since your last visit? Include any pap smears or colon screening. No    Encouraged pt to discuss pt's wishes with spouse/partner/family and bring them in the next appt to follow thru with the Advanced Directive    Fall Risk Assessment, last 12 mths 1/9/2018   Able to walk? Yes   Fall in past 12 months? No   Fall with injury? -   Number of falls in past 12 months -   Fall Risk Score -       PHQ over the last two weeks 1/9/2018   Little interest or pleasure in doing things Several days   Feeling down, depressed, irritable, or hopeless Several days   Total Score PHQ 2 2   Trouble falling or staying asleep, or sleeping too much -   Feeling tired or having little energy -   Poor appetite, weight loss, or overeating -   Feeling bad about yourself - or that you are a failure or have let yourself or your family down -   Trouble concentrating on things such as school, work, reading, or watching TV -   Moving or speaking so slowly that other people could have noticed; or the opposite being so fidgety that others notice -   Thoughts of being better off dead, or hurting yourself in some way -   How difficult have these problems made it for you to do your work, take care of your home and get along with others -       Abuse Screening Questionnaire 1/9/2018   Do you ever feel afraid of your partner? N   Are you in a relationship with someone who physically or mentally threatens you? N   Is it safe for you to go home?  Y       ADL Assessment 1/9/2018   Feeding yourself No Help Needed   Getting from bed to chair No Help Needed   Getting dressed No Help Needed   Bathing or showering No Help Needed   Walk across the room (includes cane/walker) No Help Needed   Using the telphone Help Needed   Taking your medications Help Needed   Preparing meals Help Needed   Managing money (expenses/bills) Help Needed   Moderately strenuous housework (laundry) Help Needed   Shopping for personal items (toiletries/medicines) Help Needed   Shopping for groceries Help Needed   Driving Help Needed   Climbing a flight of stairs Help Needed   Getting to places beyond walking distances Help Needed

## 2018-10-30 RX ORDER — ISOSORBIDE MONONITRATE 60 MG/1
TABLET, EXTENDED RELEASE ORAL
Qty: 30 TAB | Refills: 11 | Status: SHIPPED | OUTPATIENT
Start: 2018-10-30 | End: 2019-10-14 | Stop reason: SDUPTHER

## 2018-11-07 NOTE — TELEPHONE ENCOUNTER
Patient is calling for refill for repatha 140 mg. To go to [facility], she said that you would know about this. Thanks

## 2019-01-09 ENCOUNTER — TELEPHONE (OUTPATIENT)
Dept: CARDIOLOGY CLINIC | Age: 49
End: 2019-01-09

## 2019-01-09 NOTE — TELEPHONE ENCOUNTER
Per Adrienne Beckford, pt would like for you to send Repatha  medication refill request to [facility] # [phone]. Please call Adrienne Beckford should you have any question.     Thanks,    [employer]

## 2019-01-29 ENCOUNTER — TELEPHONE (OUTPATIENT)
Dept: INTERNAL MEDICINE CLINIC | Age: 49
End: 2019-01-29

## 2019-01-29 NOTE — TELEPHONE ENCOUNTER
1/29/19 Called 3  PA initiated with PA rep David ZHANG,for pantoprazole 40 mg tablets,approval given effective 1/29/19 thru 1/29/20. Refrence # [id number]. Approval called to [address] [place] [address] @ 4 [phone],spoke with Tahir Weaver stated it went thru with 0 co pay,patient will be notified when ready for pickup.

## 2019-02-04 ENCOUNTER — OFFICE VISIT (OUTPATIENT)
Dept: INTERNAL MEDICINE CLINIC | Age: 49
End: 2019-02-04

## 2019-02-04 VITALS
RESPIRATION RATE: 18 BRPM | OXYGEN SATURATION: 96 % | SYSTOLIC BLOOD PRESSURE: 103 MMHG | WEIGHT: 269 LBS | HEART RATE: 69 BPM | TEMPERATURE: 97.5 F | BODY MASS INDEX: 38.51 KG/M2 | DIASTOLIC BLOOD PRESSURE: 66 MMHG | HEIGHT: 70 IN

## 2019-02-04 DIAGNOSIS — Z12.11 SCREENING FOR COLORECTAL CANCER: ICD-10-CM

## 2019-02-04 DIAGNOSIS — I69.354 HEMIPARESIS AFFECTING LEFT SIDE AS LATE EFFECT OF CEREBROVASCULAR ACCIDENT (CVA) (HCC): ICD-10-CM

## 2019-02-04 DIAGNOSIS — I63.341 CEREBRAL INFARCTION DUE TO THROMBOSIS OF RIGHT CEREBELLAR ARTERY (HCC): ICD-10-CM

## 2019-02-04 DIAGNOSIS — E66.01 SEVERE OBESITY (BMI 35.0-39.9) WITH COMORBIDITY (HCC): ICD-10-CM

## 2019-02-04 DIAGNOSIS — I10 ESSENTIAL HYPERTENSION: Primary | ICD-10-CM

## 2019-02-04 DIAGNOSIS — Z12.12 SCREENING FOR COLORECTAL CANCER: ICD-10-CM

## 2019-02-04 RX ORDER — METOPROLOL TARTRATE 25 MG/1
TABLET, FILM COATED ORAL
Qty: 90 TAB | Refills: 1 | Status: SHIPPED | OUTPATIENT
Start: 2019-02-04 | End: 2019-10-14 | Stop reason: SDUPTHER

## 2019-02-04 NOTE — PROGRESS NOTES
Chief Complaint Patient presents with  Hypertension FOLLOW UP VISIT 1. Have you been to the ER, urgent care clinic since your last visit? Hospitalized since your last visit? No 
 
2. Have you seen or consulted any other health care providers outside of the [address] [place] [address] since your last visit? Include any pap smears or colon screening. No  
 
Health Maintenance Due Topic Date Due  Shingrix Vaccine Age 50> (1 of 2) 12/29/2018  FOBT Q 1 YEAR AGE 50-75  12/29/2018

## 2019-02-04 NOTE — PROGRESS NOTES
Subjective:  
 
sIis Baker is a 46 y.o. male who presents for follow up of hypertension, hyperlipidemia and history of prior stroke. New concerns: feeling well in here with his significant other. Doing well in general, no complaints. Has upper extremity weakness from his prior stroke, no new symptoms. His significant other as noted general weight gain gradually. Going to start a diet, have cut out bread and pasta. Briefly discussed low carbohydrate diets and encouraged him to do so. Current Outpatient Medications Medication Sig Dispense Refill  evolocumab (REPATHA SURECLICK) pen injection Inject 140 mg under the skin every 2 weeks 6 Pen 4  
 isosorbide mononitrate ER (IMDUR) 60 mg CR tablet TAKE ONE TABLET BY MOUTH IN THE MORNING 30 Tab 11  
 pantoprazole (PROTONIX) 40 mg tablet Take 1 Tab by mouth daily. 90 Tab 3  
 ranolazine ER (RANEXA) 1,000 mg Take 1 Tab by mouth two (2) times a day. 180 Tab 3  
 topiramate (TOPAMAX) 50 mg tablet TAKE ONE TABLET BY MOUTH TWICE DAILY 180 Tab 3  
 metoprolol tartrate (LOPRESSOR) 25 mg tablet TAKE ONE-HALF TABLET BY MOUTH TWICE DAILY 90 Tab 1  clopidogrel (PLAVIX) 75 mg tab TAKE ONE TABLET BY MOUTH ONCE DAILY 90 Tab 3  
 citalopram (CELEXA) 20 mg tablet TAKE ONE & ONE-HALF TABLETS BY MOUTH ONCE DAILY 135 Tab 3  
 mirabegron ER (MYRBETRIQ) 50 mg ER tablet Take 50 mg by mouth daily.  gabapentin (NEURONTIN) 300 mg capsule Take 300 mg by mouth three (3) times daily.  aspirin delayed-release 81 mg tablet Take 1 Tab by mouth daily. 90 Tab 3  
 omega-3 fatty acids-vitamin e (FISH OIL) 1,000 mg cap Take 1 Cap by mouth.  fexofenadine (ALLEGRA) 180 mg tablet Take  by mouth daily. Allergies Allergen Reactions  Crestor [Rosuvastatin] Rash  Iodinated Contrast- Oral And Iv Dye Hives  Statins-Hmg-Coa Reductase Inhibitors Myalgia Diet and Lifestyle: not attempting to follow a low fat, low cholesterol diet, nonsmoker Cardiovascular ROS: taking medications as instructed, no medication side effects noted, no TIA's, no chest pain on exertion, no dyspnea on exertion, no swelling of ankles, no orthopnea or paroxysmal nocturnal dyspnea. Review of Systems, additional: 
Pertinent items are noted in HPI. No bleeding noted Patient Active Problem List  
 Diagnosis Date Noted  Severe obesity (BMI 35.0-39. 9) with comorbidity ([facility] 2018  Essential hypertension 2018  Facial droop 2017  Left arm weakness 2017  Coronary artery disease involving native coronary artery of native heart without angina pectoris 2017  Hemiparesis affecting left side as late effect of cerebrovascular accident (CVA) ([facility] 2017  Mixed hyperlipidemia 2017  Episodic cluster headache, not intractable 2016  Headache 2016  Cerebral infarction due to thrombosis of right [facility] artery [facility] 2015  Depression 2015  Meralgia paresthetica of left side 2013  CAD (coronary artery disease) 2012  S/P PTCA (percutaneous transluminal coronary angioplasty) 2012  Abnormal EKG 2012  Family history of early CAD 2012  Dizziness 2012 Social History Tobacco Use  Smoking status: Former Smoker Packs/day: 0.30 Years: 30.00 Pack years: 9.00 Types: Cigarettes Last attempt to quit: 2011 Years since quittin.3  Smokeless tobacco: Never Used Substance Use Topics  Alcohol use: No  
  Alcohol/week: 0.0 oz Lab Results Component Value Date/Time WBC 6.3 2018 04:02 PM  
 HGB 13.9 2018 04:02 PM  
 HCT 40.0 2018 04:02 PM  
 PLATELET 271  04:02 PM  
 MCV 98 (H) 2018 04:02 PM  
 
Lab Results Component Value Date/Time  Cholesterol, total 155 2018 10:08 AM  
 HDL Cholesterol 41 2018 10:08 AM  
 LDL,Direct 70 2016 09:43 AM  
 LDL, calculated 67 08/07/2018 10:08 AM  
 Triglyceride 237 (H) 08/07/2018 10:08 AM  
 CHOL/HDL Ratio 5.1 (H) 04/25/2017 12:15 AM  
 
Lab Results Component Value Date/Time ALT (SGPT) 19 08/01/2017 09:54 AM  
 AST (SGOT) 23 08/01/2017 09:54 AM  
 Alk. phosphatase 57 08/01/2017 09:54 AM  
 Bilirubin, total 0.3 08/01/2017 09:54 AM  
 Albumin 4.3 08/01/2017 09:54 AM  
 Protein, total 6.7 08/01/2017 09:54 AM  
 INR 1.1 04/25/2017 07:40 PM  
 Prothrombin time 13.5 04/25/2017 07:40 PM  
 PLATELET 180 24/37/5430 04:02 PM  
 
Lab Results Component Value Date/Time GFR est non-AA 92 06/04/2018 04:02 PM  
 GFR est  06/04/2018 04:02 PM  
 Creatinine 0.96 06/04/2018 04:02 PM  
 BUN 8 06/04/2018 04:02 PM  
 Sodium 141 06/04/2018 04:02 PM  
 Potassium 3.9 06/04/2018 04:02 PM  
 Chloride 105 06/04/2018 04:02 PM  
 CO2 20 06/04/2018 04:02 PM  
 Magnesium 1.8 04/26/2017 05:13 AM  
 
Lab Results Component Value Date/Time Glucose 81 06/04/2018 04:02 PM  
 Glucose (POC) 132 (H) 04/25/2017 12:21 AM  
 Glucose (POC) 119 (H) 07/13/2012 08:15 AM  
   
 
 
 
Objective:  
 
Physical exam significant for the following:  
 
Left upper extremity flexure Visit Vitals /66 (BP 1 Location: Right arm, BP Patient Position: Sitting) Pulse 69 Temp 97.5 °F (36.4 °C) (Oral) Resp 18 Ht 5' 10\" (1.778 m) Wt 269 lb (122 kg) SpO2 96% BMI 38.60 kg/m² Appearance: alert, well appearing, and in no distress. General exam: CVS exam BP noted to be well controlled today in office, S1, S2 normal, no gallop, no murmur, chest clear, no JVD, no HSM, no edema. .  
Assessment/Plan:  
 
hypertension stable. ICD-10-CM ICD-9-CM 1. Essential hypertension I10 401.9 2. Hemiparesis affecting left side as late effect of cerebrovascular accident (CVA) ([facility] I69.354 438.20   
3.  Screening for colorectal cancer Z12.11 V76.51 OCCULT BLOOD IMMUNOASSAY,DIAGNOSTIC  
 Z12.12    
 4. 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   
5. Severe obesity (BMI 35.0-39. 9) with comorbidity ([address] E66.01 278.01 Discussed the patient's BMI with him. The BMI follow up plan is as follows:  
 
dietary management education, guidance, and counseling 
encourage exercise 
monitor weight 
prescribed dietary intake An After Visit Summary was printed and given to the patient. We discussed a screening exam colon ca tests and the  the pros and cons of having the test done. The patient made a decision to do the test: yes Chronic Conditions Addressed Today 1. Severe obesity (BMI 35.0-39. 9) with comorbidity ([address] 2. Hemiparesis affecting left side as late effect of cerebrovascular accident (CVA) ([address] 3. Essential hypertension - Primary Relevant Medications  
  metoprolol tartrate (LOPRESSOR) 25 mg tablet 4. 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 
Return in about 6 months (around 8/4/2019).

## 2019-02-05 NOTE — PATIENT INSTRUCTIONS
Body Mass Index: Care Instructions Your Care Instructions Body mass index (BMI) can help you see if your weight is raising your risk for health problems. It uses a formula to compare how much you weigh with how tall you are. · A BMI lower than 18.5 is considered underweight. · A BMI between 18.5 and 24.9 is considered healthy. · A BMI between 25 and 29.9 is considered overweight. A BMI of 30 or higher is considered obese. If your BMI is in the normal range, it means that you have a lower risk for weight-related health problems. If your BMI is in the overweight or obese range, you may be at increased risk for weight-related health problems, such as high blood pressure, heart disease, stroke, arthritis or joint pain, and diabetes. If your BMI is in the underweight range, you may be at increased risk for health problems such as fatigue, lower protection (immunity) against illness, muscle loss, bone loss, hair loss, and hormone problems. BMI is just one measure of your risk for weight-related health problems. You may be at higher risk for health problems if you are not active, you eat an unhealthy diet, or you drink too much alcohol or use tobacco products. Follow-up care is a key part of your treatment and safety. Be sure to make and go to all appointments, and call your doctor if you are having problems. It's also a good idea to know your test results and keep a list of the medicines you take. How can you care for yourself at home? · Practice healthy eating habits. This includes eating plenty of fruits, vegetables, whole grains, lean protein, and low-fat dairy. · If your doctor recommends it, get more exercise. Walking is a good choice. Bit by bit, increase the amount you walk every day. Try for at least 30 minutes on most days of the week. · Do not smoke. Smoking can increase your risk for health problems.  If you need help quitting, talk to your doctor about stop-smoking programs and medicines. These can increase your chances of quitting for good. · Limit alcohol to 2 drinks a day for men and 1 drink a day for women. Too much alcohol can cause health problems. If you have a BMI higher than 25 · Your doctor may do other tests to check your risk for weight-related health problems. This may include measuring the distance around your waist. A waist measurement of more than 40 inches in men or 35 inches in women can increase the risk of weight-related health problems. · Talk with your doctor about steps you can take to stay healthy or improve your health. You may need to make lifestyle changes to lose weight and stay healthy, such as changing your diet and getting regular exercise. If you have a BMI lower than 18.5 · Your doctor may do other tests to check your risk for health problems. · Talk with your doctor about steps you can take to stay healthy or improve your health. You may need to make lifestyle changes to gain or maintain weight and stay healthy, such as getting more healthy foods in your diet and doing exercises to build muscle. Where can you learn more? Go to http://etta-fer.info/. Enter S176 in the search box to learn more about \"Body Mass Index: Care Instructions. \" Current as of: October 13, 2016 Content Version: 11.4 © 8797-5084 [employer]. Care instructions adapted under license by [employer] (which disclaims liability or warranty for this information). If you have questions about a medical condition or this instruction, always ask your healthcare professional. [address] any warranty or liability for your use of this information.

## 2019-02-19 ENCOUNTER — OFFICE VISIT (OUTPATIENT)
Dept: CARDIOLOGY CLINIC | Age: 49
End: 2019-02-19

## 2019-02-19 VITALS
WEIGHT: 270.2 LBS | RESPIRATION RATE: 16 BRPM | OXYGEN SATURATION: 98 % | DIASTOLIC BLOOD PRESSURE: 70 MMHG | BODY MASS INDEX: 38.68 KG/M2 | HEIGHT: 70 IN | HEART RATE: 64 BPM | SYSTOLIC BLOOD PRESSURE: 124 MMHG

## 2019-02-19 DIAGNOSIS — I10 ESSENTIAL HYPERTENSION: Primary | ICD-10-CM

## 2019-02-19 DIAGNOSIS — Z98.61 S/P PTCA (PERCUTANEOUS TRANSLUMINAL CORONARY ANGIOPLASTY): ICD-10-CM

## 2019-02-19 DIAGNOSIS — I25.10 CORONARY ARTERY DISEASE INVOLVING NATIVE CORONARY ARTERY OF NATIVE HEART WITHOUT ANGINA PECTORIS: ICD-10-CM

## 2019-02-19 DIAGNOSIS — E78.2 MIXED HYPERLIPIDEMIA: ICD-10-CM

## 2019-02-19 DIAGNOSIS — I63.341 CEREBRAL INFARCTION DUE TO THROMBOSIS OF RIGHT CEREBELLAR ARTERY (HCC): ICD-10-CM

## 2019-02-19 NOTE — PROGRESS NOTES
Jacklyn Braga DNP, ANP-BC  Subjective/HPI:     Marleen Latham is a 46 y.o. male is here for routine f/u. The patient denies chest pain/ shortness of breath, orthopnea, PND, LE edema, palpitations, syncope, presyncope or fatigue. Patient reports feeling well in his usual state of health. Left arm remains in sling after CVA. He has been tolerating Repatha well without adverse effects, LDL has responded nicely. PCP Provider  Froilan Andersen MD  Past Medical History:   Diagnosis Date    CAD (coronary artery disease)     GERD (gastroesophageal reflux disease)     Headache(784.0)     Hypertension     Stroke ([address] 2011      Past Surgical History:   Procedure Laterality Date    CARDIAC SURG PROCEDURE UNLIST      HX CORONARY STENT PLACEMENT  2012     Allergies   Allergen Reactions    Crestor [Rosuvastatin] Rash    Iodinated Contrast- Oral And Iv Dye Hives    Statins-Hmg-Coa Reductase Inhibitors Myalgia      Family History   Problem Relation Age of Onset    Heart Disease Mother     Hypertension Mother     Diabetes Father     Heart Disease Father       Current Outpatient Medications   Medication Sig    metoprolol tartrate (LOPRESSOR) 25 mg tablet TAKE ONE-HALF TABLET BY MOUTH TWICE DAILY    evolocumab (REPATHA SURECLICK) pen injection Inject 140 mg under the skin every 2 weeks    isosorbide mononitrate ER (IMDUR) 60 mg CR tablet TAKE ONE TABLET BY MOUTH IN THE MORNING    pantoprazole (PROTONIX) 40 mg tablet Take 1 Tab by mouth daily.  ranolazine ER (RANEXA) 1,000 mg Take 1 Tab by mouth two (2) times a day.  topiramate (TOPAMAX) 50 mg tablet TAKE ONE TABLET BY MOUTH TWICE DAILY    clopidogrel (PLAVIX) 75 mg tab TAKE ONE TABLET BY MOUTH ONCE DAILY    citalopram (CELEXA) 20 mg tablet TAKE ONE & ONE-HALF TABLETS BY MOUTH ONCE DAILY    mirabegron ER (MYRBETRIQ) 50 mg ER tablet Take 50 mg by mouth daily.  gabapentin (NEURONTIN) 300 mg capsule Take 300 mg by mouth three (3) times daily.  [facility] QAM & 600 QPM    aspirin delayed-release 81 mg tablet Take 1 Tab by mouth daily.  omega-3 fatty acids-vitamin e (FISH OIL) 1,000 mg cap Take 1 Cap by mouth.  fexofenadine (ALLEGRA) 180 mg tablet Take  by mouth daily. No current facility-administered medications for this visit. Vitals:    19 0920 19 0934   BP: 140/84 124/70   Pulse: 64    Resp: 16    SpO2: 98%    Weight: 270 lb 3.2 oz (122.6 kg)    Height: 5' 10\" (1.778 m)      Social History     Socioeconomic History    Marital status: SINGLE     Spouse name: Not on file    Number of children: 0    Years of education: Not on file    Highest education level: Not on file   Social Needs    Financial resource strain: Not on file    Food insecurity - worry: Not on file    Food insecurity - inability: Not on file   [employer] needs - medical: Not on file   [employer] needs - non-medical: Not on file   Occupational History    Occupation: disabled     Employer: NOT EMPLOYED   Tobacco Use    Smoking status: Former Smoker     Packs/day: 0.30     Years: 30.00     Pack years: 9.00     Types: Cigarettes     Last attempt to quit: 2011     Years since quittin.4    Smokeless tobacco: Never Used   Substance and Sexual Activity    Alcohol use: No     Alcohol/week: 0.0 oz    Drug use: No    Sexual activity: Not Currently   Other Topics Concern    Not on file   Social History Narrative    Lives on his own friends and relatives look in on him. I have reviewed the nurses notes, vitals, problem list, allergy list, medical history, family, social history and medications. Review of Symptoms:    General: Pt denies excessive weight gain or loss. Pt is able to conduct ADL's  HEENT: Denies blurred vision, headaches, epistaxis and difficulty swallowing. Respiratory: Denies shortness of breath, BERUMEN, wheezing or stridor.   Cardiovascular: Denies precordial pain, palpitations, edema or PND  Gastrointestinal: Denies poor appetite, indigestion, abdominal pain or blood in stool  Musculoskeletal: Denies pain or swelling from muscles or joints  Neurologic: Denies tremor, paresthesias, or sensory motor disturbance  Skin: Denies rash, itching or texture change. Physical Exam:      General: Well developed, in no acute distress, cooperative and alert  HEENT: No carotid bruits, no JVD, trach is midline. Neck Supple, PEERL, EOM intact. Heart:  Normal S1/S2 negative S3 or S4. Regular, no murmur, gallop or rub.   Respiratory: Clear bilaterally x 4, no wheezing or rales  Abdomen:   Soft, non-tender, no masses, bowel sounds are active.   Extremities:  No edema, normal cap refill, no cyanosis, atraumatic. Neuro: A&Ox3, speech clear, gait stable. Left arm decreased mobility secondary to CVA  Skin: Skin color is normal. No rashes or lesions.  Non diaphoretic  Vascular: 2+ pulses symmetric in all extremities    Cardiographics    ECG: Normal sinus rhythm  Results for orders placed or performed during the hospital encounter of 04/24/17   EKG, 12 LEAD, INITIAL   Result Value Ref Range    Ventricular Rate 73 BPM    Atrial Rate 73 BPM    P-R Interval 160 ms    QRS Duration 110 ms    Q-T Interval 418 ms    QTC Calculation (Bezet) 460 ms    Calculated P Axis 19 degrees    Calculated R Axis -46 degrees    Calculated T Axis 62 degrees    Diagnosis       Normal sinus rhythm  Left anterior fascicular block  Nonspecific T wave abnormality  Prolonged QT  Abnormal ECG  When compared with ECG of 31-AUG-2014 15:30,  T wave inversion more evident in Anterior leads  Confirmed by Tucker Soliz MD. ([id number]) on 5/4/2017 6:45:07 AM           Cardiology Labs:  Lab Results   Component Value Date/Time    Cholesterol, total 155 08/07/2018 10:08 AM    HDL Cholesterol 41 08/07/2018 10:08 AM    LDL,Direct 70 07/26/2016 09:43 AM    LDL, calculated 67 08/07/2018 10:08 AM    Triglyceride 237 (H) 08/07/2018 10:08 AM    CHOL/HDL Ratio 5.1 (H) 04/25/2017 12:15 AM       Lab Results   Component Value Date/Time    Sodium 141 06/04/2018 04:02 PM    Potassium 3.9 06/04/2018 04:02 PM    Chloride 105 06/04/2018 04:02 PM    CO2 20 06/04/2018 04:02 PM    Anion gap 11 04/26/2017 05:13 AM    Glucose 81 06/04/2018 04:02 PM    BUN 8 06/04/2018 04:02 PM    Creatinine 0.96 06/04/2018 04:02 PM    BUN/Creatinine ratio 8 (L) 06/04/2018 04:02 PM    GFR est  06/04/2018 04:02 PM    GFR est non-AA 92 06/04/2018 04:02 PM    Calcium 8.8 06/04/2018 04:02 PM    Bilirubin, total 0.3 08/01/2017 09:54 AM    AST (SGOT) 23 08/01/2017 09:54 AM    Alk. phosphatase 57 08/01/2017 09:54 AM    Protein, total 6.7 08/01/2017 09:54 AM    Albumin 4.3 08/01/2017 09:54 AM    Globulin 4.0 04/24/2017 08:30 PM    A-G Ratio 1.8 08/01/2017 09:54 AM    ALT (SGPT) 19 08/01/2017 09:54 AM           Assessment:     Assessment:     Diagnoses and all orders for this visit:    1. Essential hypertension  -     AMB POC EKG ROUTINE W/ 12 LEADS, INTER & REP  -     METABOLIC PANEL, COMPREHENSIVE  -     LIPID PANEL  -     CK  -     CBC WITH AUTOMATED DIFF    2. 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 Mixed hyperlipidemia    4. Coronary artery disease involving native coronary artery of native heart without angina pectoris    5. S/P PTCA (percutaneous transluminal coronary angioplasty)        ICD-10-CM ICD-9-CM    1. Essential hypertension I10 401.9 AMB POC EKG ROUTINE W/ 12 LEADS, INTER & REP      METABOLIC PANEL, COMPREHENSIVE      LIPID PANEL      CK      CBC WITH AUTOMATED DIFF   2. 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 Mixed hyperlipidemia E78.2 272.2    4. Coronary artery disease involving native coronary artery of native heart without angina pectoris I25.10 414.01    5.  S/P PTCA (percutaneous transluminal coronary angioplasty) Z98.61 V45.82      Orders Placed This Encounter    METABOLIC PANEL, COMPREHENSIVE    LIPID PANEL    CK    CBC WITH AUTOMATED DIFF    AMB POC EKG ROUTINE W/ 12 LEADS, INTER & REP     Order Specific Question:   Reason for Exam:     Answer:   routine        Plan:     1. Atherosclerotic heart disease: PTCA stenting 2012, clinically stable asymptomatic continue current medications patient will be on long-term Plavix therapy for right cerebellar artery infarct. 2.  Hypertension: Repeat 124/70 controlled continue current medications  3. Hyperlipidemia: LDL at target for 2018, will check for 2019 to continue Repatha therapy  4. CVA: Will be on long-term Plavix therapy  Follow-up with cardiology in 6 months  Cristin Luis NP    This note was created using voice recognition software. Despite editing, there may be syntax errors. Patient seen and examined by me with nurse practitioner. Kelsi Kingston personally performed all components of the history, physical, and medical decision making and agree with the assessment and plan with minor modifications as noted. 1. CAD: as above. Normal stress test in 01/2016. Normal LVEF on last Echo 04/2017.   2. CVA: f/u with neurology.      Maurizio Johnson MD

## 2019-02-19 NOTE — PROGRESS NOTES
1. Have you been to the ER, urgent care clinic since your last visit? Hospitalized since your last visit? No    2. Have you seen or consulted any other health care providers outside of the [address] [place] [address] since your last visit? Include any pap smears or colon screening. No     Chief Complaint   Patient presents with    Follow-up     HTN       Patient has no cardiac complaints.

## 2019-02-20 LAB
ALBUMIN SERPL-MCNC: 4.6 G/DL (ref 3.5–5.5)
ALBUMIN/GLOB SERPL: 1.9 {RATIO} (ref 1.2–2.2)
ALP SERPL-CCNC: 50 IU/L (ref 39–117)
ALT SERPL-CCNC: 22 IU/L (ref 0–44)
AST SERPL-CCNC: 19 IU/L (ref 0–40)
BASOPHILS # BLD AUTO: 0 X10E3/UL (ref 0–0.2)
BASOPHILS NFR BLD AUTO: 1 %
BILIRUB SERPL-MCNC: 0.5 MG/DL (ref 0–1.2)
BUN SERPL-MCNC: 10 MG/DL (ref 6–24)
BUN/CREAT SERPL: 9 (ref 9–20)
CALCIUM SERPL-MCNC: 9.5 MG/DL (ref 8.7–10.2)
CHLORIDE SERPL-SCNC: 103 MMOL/L (ref 96–106)
CHOLEST SERPL-MCNC: 171 MG/DL (ref 100–199)
CK SERPL-CCNC: 165 U/L (ref 24–204)
CO2 SERPL-SCNC: 22 MMOL/L (ref 20–29)
CREAT SERPL-MCNC: 1.11 MG/DL (ref 0.76–1.27)
EOSINOPHIL # BLD AUTO: 0.1 X10E3/UL (ref 0–0.4)
EOSINOPHIL NFR BLD AUTO: 1 %
ERYTHROCYTE [DISTWIDTH] IN BLOOD BY AUTOMATED COUNT: 13.7 % (ref 12.3–15.4)
GLOBULIN SER CALC-MCNC: 2.4 G/DL (ref 1.5–4.5)
GLUCOSE SERPL-MCNC: 102 MG/DL (ref 65–99)
HCT VFR BLD AUTO: 43.9 % (ref 37.5–51)
HDLC SERPL-MCNC: 37 MG/DL
HGB BLD-MCNC: 15.4 G/DL (ref 13–17.7)
IMM GRANULOCYTES # BLD AUTO: 0 X10E3/UL (ref 0–0.1)
IMM GRANULOCYTES NFR BLD AUTO: 0 %
INTERPRETATION, 910389: NORMAL
LDLC SERPL CALC-MCNC: 69 MG/DL (ref 0–99)
LYMPHOCYTES # BLD AUTO: 2.3 X10E3/UL (ref 0.7–3.1)
LYMPHOCYTES NFR BLD AUTO: 35 %
MCH RBC QN AUTO: 35.2 PG (ref 26.6–33)
MCHC RBC AUTO-ENTMCNC: 35.1 G/DL (ref 31.5–35.7)
MCV RBC AUTO: 101 FL (ref 79–97)
MONOCYTES # BLD AUTO: 0.6 X10E3/UL (ref 0.1–0.9)
MONOCYTES NFR BLD AUTO: 9 %
NEUTROPHILS # BLD AUTO: 3.6 X10E3/UL (ref 1.4–7)
NEUTROPHILS NFR BLD AUTO: 54 %
PLATELET # BLD AUTO: 195 X10E3/UL (ref 150–379)
POTASSIUM SERPL-SCNC: 4.4 MMOL/L (ref 3.5–5.2)
PROT SERPL-MCNC: 7 G/DL (ref 6–8.5)
RBC # BLD AUTO: 4.37 X10E6/UL (ref 4.14–5.8)
SODIUM SERPL-SCNC: 142 MMOL/L (ref 134–144)
TRIGL SERPL-MCNC: 325 MG/DL (ref 0–149)
VLDLC SERPL CALC-MCNC: 65 MG/DL (ref 5–40)
WBC # BLD AUTO: 6.7 X10E3/UL (ref 3.4–10.8)

## 2019-03-13 ENCOUNTER — DOCUMENTATION ONLY (OUTPATIENT)
Dept: CARDIOLOGY CLINIC | Age: 49
End: 2019-03-13

## 2019-03-13 NOTE — PROGRESS NOTES
Received cover my meds prior auth on Repatha. Went on line to cover my meds and filled out info. Faxed into plan. Advised up to 5 days for authorization.

## 2019-04-02 ENCOUNTER — TELEPHONE (OUTPATIENT)
Dept: CARDIOLOGY CLINIC | Age: 49
End: 2019-04-02

## 2019-04-02 NOTE — TELEPHONE ENCOUNTER
Received letter that pt's [place] has been denied. Called the prior auth number and she advised that I would have to do a peer to peer for authorization at this point. She sent me to voice mail and advised I would leave info and they would call me back. Left message advising that I needed a prior auth on pt's Repatha, left info needed. Advised I would be in office until 5 pm today otherwise I would be back in office next Tuesday 4/9/19.

## 2019-04-08 ENCOUNTER — TELEPHONE (OUTPATIENT)
Dept: CARDIOLOGY CLINIC | Age: 49
End: 2019-04-08

## 2019-04-12 NOTE — TELEPHONE ENCOUNTER
Received fax from insurance stating that Yaquelin Mccallnella has been approved from 3/31/19 - 9/29/19. Called Fatimah on HPI and left message to call me back.

## 2019-04-16 NOTE — TELEPHONE ENCOUNTER
Called Sara Ribera on HPI and left message stating that I had received a fax stating that George Alanisrandee has been approved from 3/31/19-9/29/19. Advised I just wanted to make sure the Rx has been received. Advised to call me back if needed.

## 2019-06-27 RX ORDER — CITALOPRAM 20 MG/1
TABLET, FILM COATED ORAL
Qty: 45 TAB | Refills: 11 | Status: SHIPPED | OUTPATIENT
Start: 2019-06-27 | End: 2019-10-14 | Stop reason: SDUPTHER

## 2019-06-27 RX ORDER — CLOPIDOGREL BISULFATE 75 MG/1
TABLET ORAL
Qty: 30 TAB | Refills: 11 | Status: SHIPPED | OUTPATIENT
Start: 2019-06-27 | End: 2019-10-14 | Stop reason: SDUPTHER

## 2019-07-30 ENCOUNTER — TELEPHONE (OUTPATIENT)
Dept: INTERNAL MEDICINE CLINIC | Age: 49
End: 2019-07-30

## 2019-07-30 NOTE — TELEPHONE ENCOUNTER
7/30/19 Plan called @ 2  PA completed with LESLEE Amanda for, Ranexa 1000 mg Tabs. Auto approval was given effective 7/30/19 thru 7/29/20. Fernanda Gutiérrez called ,approval , spoke with, [place] stated claim went thru with 0 co pay. Patient will be called when ready for pickup. Case ID: [id number].

## 2019-10-01 ENCOUNTER — TELEPHONE (OUTPATIENT)
Dept: CARDIOLOGY CLINIC | Age: 49
End: 2019-10-01

## 2019-10-01 NOTE — TELEPHONE ENCOUNTER
Kirill Lewis from [employer] [address] [place] [address] called for prior auth for a medication for [employer]  [facility] number is [phone]    thanks

## 2019-10-09 ENCOUNTER — TELEPHONE (OUTPATIENT)
Dept: CARDIOLOGY CLINIC | Age: 49
End: 2019-10-09

## 2019-10-09 NOTE — TELEPHONE ENCOUNTER
Please call patient family member Jimmy Marcelino back at [phone]. Ext [phone] inference to patient.         Thanks

## 2019-10-10 NOTE — TELEPHONE ENCOUNTER
Returned call and left message on voice mail stating that I had received the Repatha prior auth and filled out the info. I faxed that in 2 days ago and just waiting for a response. Advised to call me if she hears anything and I would call her if I hear something. Advised to call if needed.

## 2019-10-14 ENCOUNTER — OFFICE VISIT (OUTPATIENT)
Dept: INTERNAL MEDICINE CLINIC | Age: 49
End: 2019-10-14

## 2019-10-14 VITALS
OXYGEN SATURATION: 95 % | BODY MASS INDEX: 37.65 KG/M2 | WEIGHT: 263 LBS | SYSTOLIC BLOOD PRESSURE: 108 MMHG | DIASTOLIC BLOOD PRESSURE: 70 MMHG | HEART RATE: 64 BPM | RESPIRATION RATE: 16 BRPM | HEIGHT: 70 IN | TEMPERATURE: 97.6 F

## 2019-10-14 DIAGNOSIS — I69.354 HEMIPARESIS AFFECTING LEFT SIDE AS LATE EFFECT OF CEREBROVASCULAR ACCIDENT (CVA) (HCC): ICD-10-CM

## 2019-10-14 DIAGNOSIS — E66.01 SEVERE OBESITY (BMI 35.0-39.9) WITH COMORBIDITY (HCC): ICD-10-CM

## 2019-10-14 DIAGNOSIS — K21.9 GASTROESOPHAGEAL REFLUX DISEASE, ESOPHAGITIS PRESENCE NOT SPECIFIED: ICD-10-CM

## 2019-10-14 DIAGNOSIS — I63.341 CEREBRAL INFARCTION DUE TO THROMBOSIS OF RIGHT CEREBELLAR ARTERY (HCC): ICD-10-CM

## 2019-10-14 DIAGNOSIS — I10 ESSENTIAL HYPERTENSION: Primary | ICD-10-CM

## 2019-10-14 DIAGNOSIS — Z23 ENCOUNTER FOR IMMUNIZATION: ICD-10-CM

## 2019-10-14 DIAGNOSIS — F32.0 CURRENT MILD EPISODE OF MAJOR DEPRESSIVE DISORDER, UNSPECIFIED WHETHER RECURRENT (HCC): ICD-10-CM

## 2019-10-14 RX ORDER — RANOLAZINE 1000 MG/1
1000 TABLET, EXTENDED RELEASE ORAL 2 TIMES DAILY
Qty: 180 TAB | Refills: 3 | Status: SHIPPED | OUTPATIENT
Start: 2019-10-14 | End: 2020-10-28

## 2019-10-14 RX ORDER — CLOPIDOGREL BISULFATE 75 MG/1
TABLET ORAL
Qty: 90 TAB | Refills: 3 | Status: SHIPPED | OUTPATIENT
Start: 2019-10-14 | End: 2020-10-28

## 2019-10-14 RX ORDER — ISOSORBIDE MONONITRATE 60 MG/1
60 TABLET, EXTENDED RELEASE ORAL
Qty: 90 TAB | Refills: 3 | Status: SHIPPED | OUTPATIENT
Start: 2019-10-14 | End: 2020-10-28

## 2019-10-14 RX ORDER — PANTOPRAZOLE SODIUM 40 MG/1
40 TABLET, DELAYED RELEASE ORAL DAILY
Qty: 90 TAB | Refills: 3 | Status: SHIPPED | OUTPATIENT
Start: 2019-10-14 | End: 2020-10-28

## 2019-10-14 RX ORDER — METOPROLOL TARTRATE 25 MG/1
TABLET, FILM COATED ORAL
Qty: 90 TAB | Refills: 3 | Status: SHIPPED | OUTPATIENT
Start: 2019-10-14 | End: 2020-12-28

## 2019-10-14 RX ORDER — CITALOPRAM 20 MG/1
TABLET, FILM COATED ORAL
Qty: 135 TAB | Refills: 3 | Status: SHIPPED | OUTPATIENT
Start: 2019-10-14 | End: 2020-10-28

## 2019-10-14 RX ORDER — TOPIRAMATE 50 MG/1
TABLET, FILM COATED ORAL
Qty: 180 TAB | Refills: 3 | Status: SHIPPED | OUTPATIENT
Start: 2019-10-14 | End: 2020-10-28

## 2019-10-14 NOTE — PROGRESS NOTES
Subjective:     Enedelia Burns is a 46 y.o. male who presents for follow up of hypertension, history of prior stroke and obesity. New concerns: feels well in gen, has cards not turned in  Here with SO    Current Outpatient Medications   Medication Sig Dispense Refill    clopidogrel (PLAVIX) 75 mg tab TAKE 1 TABLET BY MOUTH ONCE DAILY 30 Tab 11    citalopram (CELEXA) 20 mg tablet TAKE 1 & 1/2 (ONE & ONE-HALF) TABLETS BY MOUTH ONCE DAILY 45 Tab 11    metoprolol tartrate (LOPRESSOR) 25 mg tablet TAKE ONE-HALF TABLET BY MOUTH TWICE DAILY 90 Tab 1    evolocumab (REPATHA SURECLICK) pen injection Inject 140 mg under the skin every 2 weeks 6 Pen 4    isosorbide mononitrate ER (IMDUR) 60 mg CR tablet TAKE ONE TABLET BY MOUTH IN THE MORNING 30 Tab 11    pantoprazole (PROTONIX) 40 mg tablet Take 1 Tab by mouth daily. 90 Tab 3    ranolazine ER (RANEXA) 1,000 mg Take 1 Tab by mouth two (2) times a day. 180 Tab 3    topiramate (TOPAMAX) 50 mg tablet TAKE ONE TABLET BY MOUTH TWICE DAILY 180 Tab 3    mirabegron ER (MYRBETRIQ) 50 mg ER tablet Take 50 mg by mouth daily.  gabapentin (NEURONTIN) 300 mg capsule Take 300 mg by mouth three (3) times daily. 300 QAM & 600 QPM      aspirin delayed-release 81 mg tablet Take 1 Tab by mouth daily. 90 Tab 3    omega-3 fatty acids-vitamin e (FISH OIL) 1,000 mg cap Take 1 Cap by mouth.  fexofenadine (ALLEGRA) 180 mg tablet Take  by mouth daily. Allergies   Allergen Reactions    Crestor [Rosuvastatin] Rash    Iodinated Contrast Media Hives    Statins-Hmg-Coa Reductase Inhibitors Myalgia       Diet and Lifestyle: generally follows a low fat low cholesterol diet    Cardiovascular ROS: taking medications as instructed, no medication side effects noted, no chest pain on exertion, no dyspnea on exertion, no swelling of ankles, no new weakness  . Review of Systems, additional:  Pertinent items are noted in HPI.   No bleeding    Patient Active Problem List Diagnosis Date Noted    Severe obesity (BMI 35.0-39. 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 ([facility] 2015    Depression 2015    Meralgia paresthetica of left side 2013    CAD (coronary artery disease) 2012    S/P PTCA (percutaneous transluminal coronary angioplasty) 2012    Abnormal EKG 2012    Family history of early CAD 2012    Dizziness 2012     Social History     Tobacco Use    Smoking status: Former Smoker     Packs/day: 0.30     Years: 30.00     Pack years: 9.00     Types: Cigarettes     Last attempt to quit: 2011     Years since quittin.0    Smokeless tobacco: Never Used   Substance Use Topics    Alcohol use: No     Alcohol/week: 0.0 standard drinks        Lab Results   Component Value Date/Time    WBC 6.7 2019 10:59 AM    HGB 15.4 2019 10:59 AM    HCT 43.9 2019 10:59 AM    PLATELET 292  10:59 AM     (H) 2019 10:59 AM     Lab Results   Component Value Date/Time    Cholesterol, total 171 2019 10:59 AM    HDL Cholesterol 37 (L) 2019 10:59 AM    LDL,Direct 70 2016 09:43 AM    LDL, calculated 69 2019 10:59 AM    Triglyceride 325 (H) 2019 10:59 AM    CHOL/HDL Ratio 5.1 (H) 2017 12:15 AM     Lab Results   Component Value Date/Time    ALT (SGPT) 22 2019 10:59 AM    AST (SGOT) 19 2019 10:59 AM    Alk.  phosphatase 50 2019 10:59 AM    Bilirubin, total 0.5 2019 10:59 AM Albumin 4.6 02/19/2019 10:59 AM    Protein, total 7.0 02/19/2019 10:59 AM    INR 1.1 04/25/2017 07:40 PM    Prothrombin time 13.5 04/25/2017 07:40 PM    PLATELET 271 01/45/7361 10:59 AM     Lab Results   Component Value Date/Time    GFR est non-AA 77 02/19/2019 10:59 AM    GFR est AA 89 02/19/2019 10:59 AM    Creatinine 1.11 02/19/2019 10:59 AM    BUN 10 02/19/2019 10:59 AM    Sodium 142 02/19/2019 10:59 AM    Potassium 4.4 02/19/2019 10:59 AM    Chloride 103 02/19/2019 10:59 AM    CO2 22 02/19/2019 10:59 AM    Magnesium 1.8 04/26/2017 05:13 AM     Lab Results   Component Value Date/Time    Glucose 102 (H) 02/19/2019 10:59 AM    Glucose (POC) 132 (H) 04/25/2017 12:21 AM    Glucose (POC) 119 (H) 07/13/2012 08:15 AM             Objective:     Physical exam significant for the following:     OW NAD    Visit Vitals  /70 (BP 1 Location: Right arm, BP Patient Position: Sitting)   Pulse 64   Temp 97.6 °F (36.4 °C) (Oral)   Resp 16   Ht 5' 10\" (1.778 m)   Wt 263 lb (119.3 kg)   SpO2 95%   BMI 37.74 kg/m²     Appearance: alert, well appearing, and in no distress. General exam: CVS exam BP noted to be well controlled today in office, S1, S2 normal, no gallop, no murmur, chest clear, no JVD, no HSM, no edema. .   Assessment/Plan:     hypertension well controlled, stable, hyperlipidemia well controlled, stable, cerebrovascular disease well controlled, stable. ICD-10-CM ICD-9-CM    1. Essential hypertension I10 401.9 isosorbide mononitrate ER (IMDUR) 60 mg CR tablet      metoprolol tartrate (LOPRESSOR) 25 mg tablet   2. Hemiparesis affecting left side as late effect of cerebrovascular accident (CVA) (HCC) I69.354 438.20 topiramate (TOPAMAX) 50 mg tablet   3. 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 Severe obesity (BMI 35.0-39. 9) with comorbidity ([facility] E66.01 278.01    5.  Gastroesophageal reflux disease, esophagitis presence not specified K21.9 530.81 pantoprazole (PROTONIX) 40 mg tablet   6. Encounter for immunization Z23 V03.89 INFLUENZA VIRUS VAC QUAD,SPLIT,PRESV FREE SYRINGE IM      varicella-zoster recombinant, PF, (SHINGRIX) 50 mcg/0.5 mL susr injection   7. Current mild episode of major depressive disorder, unspecified whether recurrent (HCC) F32.0 296.21 citalopram (CELEXA) 20 mg tablet     Orders Placed This Encounter    INFLUENZA VIRUS VACCINE QUADRIVALENT, PRESERVATIVE FREE SYRINGE (83680)    topiramate (TOPAMAX) 50 mg tablet     Sig: TAKE ONE TABLET BY MOUTH TWICE DAILY     Dispense:  180 Tab     Refill:  3    ranolazine ER (RANEXA) 1,000 mg     Sig: Take 1 Tab by mouth two (2) times a day. Dispense:  180 Tab     Refill:  3    pantoprazole (PROTONIX) 40 mg tablet     Sig: Take 1 Tab by mouth daily. Dispense:  90 Tab     Refill:  3    isosorbide mononitrate ER (IMDUR) 60 mg CR tablet     Sig: Take 1 Tab by mouth every morning. Dispense:  90 Tab     Refill:  3     Please consider 90 day supplies to promote better adherence    metoprolol tartrate (LOPRESSOR) 25 mg tablet     Sig: TAKE ONE-HALF TABLET BY MOUTH TWICE DAILY     Dispense:  90 Tab     Refill:  3    citalopram (CELEXA) 20 mg tablet     Sig: TAKE 1 & 1/2 (ONE & ONE-HALF) TABLETS BY MOUTH ONCE DAILY     Dispense:  135 Tab     Refill:  3     Please consider 90 day supplies to promote better adherence    clopidogrel (PLAVIX) 75 mg tab     Sig: TAKE 1 TABLET BY MOUTH ONCE DAILY     Dispense:  90 Tab     Refill:  3     Please consider 90 day supplies to promote better adherence    varicella-zoster recombinant, PF, (SHINGRIX) 50 mcg/0.5 mL susr injection     Si.5 mL by IntraMUSCular route once for 1 dose. Dispense:  0.5 mL     Refill:  0       rf meds as above.

## 2019-10-14 NOTE — TELEPHONE ENCOUNTER
[facility], she advised that pt's new speciality pharmacy is [facility] not [facility] any longer. She advised that a new script for Mickie Hughes will need to be sent over. Once received it will need a prior auth and they will send me the info regarding this. She advised the prior auth number is [id number]. Sent Repatha script to  for approval and to send to the new speciality pharmacy.

## 2019-10-14 NOTE — PROGRESS NOTES
Chief Complaint   Patient presents with    Hypertension     refills     I have reviewed the patient's medical history in detail and updated the computerized patient record. Health Maintenance reviewed. 1. Have you been to the ER, urgent care clinic since your last visit? Hospitalized since your last visit?no    2. Have you seen or consulted any other health care providers outside of the [address] [place] [address] since your last visit? Include any pap smears or colon screening. No      Encouraged pt to discuss pt's wishes with spouse/partner/family and bring them in the next appt to follow thru with the Advanced Directive    Fall Risk Assessment, last 12 mths 1/9/2018   Able to walk? Yes   Fall in past 12 months? No   Fall with injury? -   Number of falls in past 12 months -   Fall Risk Score -       3 most recent PHQ Screens 2/4/2019   Little interest or pleasure in doing things Not at all   Feeling down, depressed, irritable, or hopeless Not at all   Total Score PHQ 2 0   Trouble falling or staying asleep, or sleeping too much -   Feeling tired or having little energy -   Poor appetite, weight loss, or overeating -   Feeling bad about yourself - or that you are a failure or have let yourself or your family down -   Trouble concentrating on things such as school, work, reading, or watching TV -   Moving or speaking so slowly that other people could have noticed; or the opposite being so fidgety that others notice -   Thoughts of being better off dead, or hurting yourself in some way -   How difficult have these problems made it for you to do your work, take care of your home and get along with others -       Abuse Screening Questionnaire 2/4/2019   Do you ever feel afraid of your partner? N   Are you in a relationship with someone who physically or mentally threatens you? N   Is it safe for you to go home?  Y       ADL Assessment 2/4/2019   Feeding yourself No Help Needed   Getting from bed to chair No Help Needed   Getting dressed No Help Needed   Bathing or showering No Help Needed   Walk across the room (includes cane/walker) No Help Needed   Using the telphone No Help Needed   Taking your medications No Help Needed   Preparing meals No Help Needed   Managing money (expenses/bills) No Help Needed   Moderately strenuous housework (laundry) No Help Needed   Shopping for personal items (toiletries/medicines) No Help Needed   Shopping for groceries No Help Needed   Driving No Help Needed   Climbing a flight of stairs No Help Needed   Getting to places beyond walking distances No Help Needed

## 2019-10-18 NOTE — TELEPHONE ENCOUNTER
Received prior auth on Repatha. Went on cover my meds and filled out info. It was sent to plan. Will wait on determination.

## 2019-10-18 NOTE — TELEPHONE ENCOUNTER
Manny Laughlin on [facility] and left message advising that pt's Jaycee Kos has been approved from 10/18/19 until 4/15/20. Advised I had an issue since [facility] is now the specialty pharmacy not [facility]. Advised to call back if needed.

## 2019-10-28 ENCOUNTER — OFFICE VISIT (OUTPATIENT)
Dept: CARDIOLOGY CLINIC | Age: 49
End: 2019-10-28

## 2019-10-28 VITALS
BODY MASS INDEX: 38.22 KG/M2 | OXYGEN SATURATION: 98 % | DIASTOLIC BLOOD PRESSURE: 84 MMHG | SYSTOLIC BLOOD PRESSURE: 124 MMHG | WEIGHT: 267 LBS | HEART RATE: 60 BPM | RESPIRATION RATE: 18 BRPM | HEIGHT: 70 IN

## 2019-10-28 DIAGNOSIS — I25.119 CORONARY ARTERY DISEASE INVOLVING NATIVE CORONARY ARTERY OF NATIVE HEART WITH ANGINA PECTORIS (HCC): ICD-10-CM

## 2019-10-28 DIAGNOSIS — Z98.61 S/P PTCA (PERCUTANEOUS TRANSLUMINAL CORONARY ANGIOPLASTY): ICD-10-CM

## 2019-10-28 DIAGNOSIS — R06.09 DOE (DYSPNEA ON EXERTION): ICD-10-CM

## 2019-10-28 DIAGNOSIS — I10 ESSENTIAL HYPERTENSION: ICD-10-CM

## 2019-10-28 DIAGNOSIS — I25.10 ASHD (ARTERIOSCLEROTIC HEART DISEASE): Primary | ICD-10-CM

## 2019-10-28 DIAGNOSIS — E78.2 MIXED HYPERLIPIDEMIA: ICD-10-CM

## 2019-10-28 NOTE — PROGRESS NOTES
Suzi Mckeon DNP, ANP-BC  Subjective/HPI:     Enedelia Burns is a 46 y.o. male is here for routine f/u and symptom based appointment. Patient reports since the last visit he is having noted dyspnea on exertion with increased amounts of walking on flat surfaces. He also admits to intermittent dizziness with positional change. He denies chest pain however states he gets a intermittent pressure-like feeling that is associated with walking. He has been compliant on all medications, other than a one-month interruption and Repatha due to pharmacy change he is on long-term Plavix therapy for previous CVA. PCP Provider  Emelyn Vaca MD  Past Medical History:   Diagnosis Date    CAD (coronary artery disease)     GERD (gastroesophageal reflux disease)     Headache(784.0)     Hypertension     Stroke ([address] 2011      Past Surgical History:   Procedure Laterality Date    CARDIAC SURG PROCEDURE UNLIST      HX CORONARY STENT PLACEMENT  2012     Allergies   Allergen Reactions    Crestor [Rosuvastatin] Rash    Iodinated Contrast Media Hives    Statins-Hmg-Coa Reductase Inhibitors Myalgia      Family History   Problem Relation Age of Onset    Heart Disease Mother     Hypertension Mother     Diabetes Father     Heart Disease Father       Current Outpatient Medications   Medication Sig    evolocumab (REPATHA SURECLICK) pen injection Inject 140 mg under the skin every 2 weeks    topiramate (TOPAMAX) 50 mg tablet TAKE ONE TABLET BY MOUTH TWICE DAILY    ranolazine ER (RANEXA) 1,000 mg Take 1 Tab by mouth two (2) times a day.  pantoprazole (PROTONIX) 40 mg tablet Take 1 Tab by mouth daily.  isosorbide mononitrate ER (IMDUR) 60 mg CR tablet Take 1 Tab by mouth every morning.     metoprolol tartrate (LOPRESSOR) 25 mg tablet TAKE ONE-HALF TABLET BY MOUTH TWICE DAILY    citalopram (CELEXA) 20 mg tablet TAKE 1 & 1/2 (ONE & ONE-HALF) TABLETS BY MOUTH ONCE DAILY    clopidogrel (PLAVIX) 75 mg tab TAKE 1 TABLET BY MOUTH ONCE DAILY    mirabegron ER (MYRBETRIQ) 50 mg ER tablet Take 50 mg by mouth daily.  gabapentin (NEURONTIN) 300 mg capsule Take 300 mg by mouth two (2) times a day. 300 QAM & 600 QPM    aspirin delayed-release 81 mg tablet Take 1 Tab by mouth daily.  omega-3 fatty acids-vitamin e (FISH OIL) 1,000 mg cap Take 1 Cap by mouth daily.  fexofenadine (ALLEGRA) 180 mg tablet Take  by mouth daily. No current facility-administered medications for this visit.        Vitals:    10/28/19 0910 10/28/19 0924 10/28/19 0926   BP: 106/70 120/80 124/84   Pulse: 60     Resp: 18     SpO2: 98%     Weight: 267 lb (121.1 kg)     Height: 5' 10\" (1.778 m)       Social History     Socioeconomic History    Marital status: SINGLE     Spouse name: Not on file    Number of children: 0    Years of education: Not on file    Highest education level: Not on file   Occupational History    Occupation: disabled     Employer: NOT EMPLOYED   Social Needs    Financial resource strain: Not on file    Food insecurity:     Worry: Not on file     Inability: Not on file    Transportation needs:     Medical: Not on file     Non-medical: Not on file   Tobacco Use    Smoking status: Former Smoker     Packs/day: 0.30     Years: 30.00     Pack years: 9.00     Types: Cigarettes     Last attempt to quit: 2011     Years since quittin.1    Smokeless tobacco: Never Used   Substance and Sexual Activity    Alcohol use: No     Alcohol/week: 0.0 standard drinks    Drug use: No    Sexual activity: Not Currently   Lifestyle    Physical activity:     Days per week: Not on file     Minutes per session: Not on file    Stress: Not on file   Relationships    Social connections:     Talks on phone: Not on file     Gets together: Not on file     Attends Confucianism service: Not on file     Active member of club or organization: Not on file     Attends meetings of clubs or organizations: Not on file     Relationship status: Not on file    Intimate partner violence:     Fear of current or ex partner: Not on file     Emotionally abused: Not on file     Physically abused: Not on file     Forced sexual activity: Not on file   Other Topics Concern    Not on file   Social History Narrative    Lives on his own friends and relatives look in on him. I have reviewed the nurses notes, vitals, problem list, allergy list, medical history, family, social history and medications. Review of Symptoms:    General: Pt denies excessive weight gain or loss. Pt is able to conduct ADL's  HEENT: Denies blurred vision, headaches, epistaxis and difficulty swallowing. Respiratory: Denies shortness of breath, + BERUMEN, wheezing or stridor. Cardiovascular: + Chest pressure, palpitations, edema or PND  Gastrointestinal: Denies poor appetite, indigestion, abdominal pain or blood in stool  Musculoskeletal: Denies pain or swelling from muscles or joints  Neurologic: + Dizziness with positional change, residual left arm weakness from CVA. Skin: Denies rash, itching or texture change. Physical Exam:      General: Well developed, in no acute distress, cooperative and alert  HEENT: No carotid bruits, no JVD, trach is midline. Neck Supple, PERRL, EOM intact. Bilateral ear canals are patent, tympanic membranes normal no effusion noted  Heart:  Normal S1/S2 negative S3 or S4. Regular, no murmur, gallop or rub. Respiratory: Clear bilaterally x 4, no wheezing or rales  Abdomen:   Soft, non-tender, no masses, bowel sounds are active. Extremities:  No edema, normal cap refill, no cyanosis, atraumatic. Neuro: A&Ox3, speech clear, gait stable with cane, left arm weakness  Skin: Skin color is normal. No rashes or lesions.  Non diaphoretic  Vascular: 2+ pulses symmetric in all extremities    Cardiographics    ECG: Normal sinus rhythm  Results for orders placed or performed during the hospital encounter of 04/24/17   EKG, 12 LEAD, INITIAL   Result Value Ref Range Ventricular Rate 73 BPM    Atrial Rate 73 BPM    P-R Interval 160 ms    QRS Duration 110 ms    Q-T Interval 418 ms    QTC Calculation (Bezet) 460 ms    Calculated P Axis 19 degrees    Calculated R Axis -46 degrees    Calculated T Axis 62 degrees    Diagnosis       Normal sinus rhythm  Left anterior fascicular block  Nonspecific T wave abnormality  Prolonged QT  Abnormal ECG  When compared with ECG of 31-AUG-2014 15:30,  T wave inversion more evident in Anterior leads  Confirmed by Valentino Northern, MD. ([id number]) on 5/4/2017 6:45:07 AM           Cardiology Labs:  Lab Results   Component Value Date/Time    Cholesterol, total 171 02/19/2019 10:59 AM    HDL Cholesterol 37 (L) 02/19/2019 10:59 AM    LDL,Direct 70 07/26/2016 09:43 AM    LDL, calculated 69 02/19/2019 10:59 AM    Triglyceride 325 (H) 02/19/2019 10:59 AM    CHOL/HDL Ratio 5.1 (H) 04/25/2017 12:15 AM       Lab Results   Component Value Date/Time    Sodium 142 02/19/2019 10:59 AM    Potassium 4.4 02/19/2019 10:59 AM    Chloride 103 02/19/2019 10:59 AM    CO2 22 02/19/2019 10:59 AM    Anion gap 11 04/26/2017 05:13 AM    Glucose 102 (H) 02/19/2019 10:59 AM    BUN 10 02/19/2019 10:59 AM    Creatinine 1.11 02/19/2019 10:59 AM    BUN/Creatinine ratio 9 02/19/2019 10:59 AM    GFR est AA 89 02/19/2019 10:59 AM    GFR est non-AA 77 02/19/2019 10:59 AM    Calcium 9.5 02/19/2019 10:59 AM    Bilirubin, total 0.5 02/19/2019 10:59 AM    AST (SGOT) 19 02/19/2019 10:59 AM    Alk. phosphatase 50 02/19/2019 10:59 AM    Protein, total 7.0 02/19/2019 10:59 AM    Albumin 4.6 02/19/2019 10:59 AM    Globulin 4.0 04/24/2017 08:30 PM    A-G Ratio 1.9 02/19/2019 10:59 AM    ALT (SGPT) 22 02/19/2019 10:59 AM           Assessment:     Assessment:     Diagnoses and all orders for this visit:    1. ASHD (arteriosclerotic heart disease)  -     AMB POC EKG ROUTINE W/ 12 LEADS, INTER & REP  -     NUCLEAR CARDIAC STRESS TEST; Future  -     ECHO ADULT COMPLETE; Future    2.  Essential hypertension  -     NUCLEAR CARDIAC STRESS TEST; Future  -     ECHO ADULT COMPLETE; Future    3. Mixed hyperlipidemia  -     NUCLEAR CARDIAC STRESS TEST; Future  -     ECHO ADULT COMPLETE; Future    4. BERUMEN (dyspnea on exertion)  -     NUCLEAR CARDIAC STRESS TEST; Future  -     ECHO ADULT COMPLETE; Future        ICD-10-CM ICD-9-CM    1. ASHD (arteriosclerotic heart disease) I25.10 414.00 AMB POC EKG ROUTINE W/ 12 LEADS, INTER & REP      NUCLEAR CARDIAC STRESS TEST      ECHO ADULT COMPLETE   2. Essential hypertension I10 401.9 NUCLEAR CARDIAC STRESS TEST      ECHO ADULT COMPLETE   3. Mixed hyperlipidemia E78.2 272.2 NUCLEAR CARDIAC STRESS TEST      ECHO ADULT COMPLETE   4. BERUMEN (dyspnea on exertion) R06.09 786.09 NUCLEAR CARDIAC STRESS TEST      ECHO ADULT COMPLETE     Orders Placed This Encounter    AMB POC EKG ROUTINE W/ 12 LEADS, INTER & REP     Order Specific Question:   Reason for Exam:     Answer:   routine     3/2013 cath  CORONARY CIRCULATION:  --  Distal LAD: There was a discrete 30 % stenosis. --  1st diagonal: There was a discrete 20 % stenosis at the ostium of the  vessel segment. --  Distal circumflex: There was a diffuse 100 % stenosis  just after OM2 extending into LPDA. Distal vessel is visualized via  collaterals. Plan:     1. Atherosclerotic heart disease: Patient reporting new onset dyspnea on exertion intermittent chest fullness/pressure sensation will evaluate for ischemia with Lexiscan nuclear stress test, currently on antianginal therapies. History of PTCA stenting of the LAD 2012, repeat cardiac catheterization 2013 distal LAD 30%, first active 20%, distal circumflex 100% lesion post OM 2 extending into the L PDA . follow-up after stress test complete  2. Dyspnea on exertion: Multifactorial, elevated BMI, somewhat sedentary from CVA, CAD history will evaluate with echocardiogram  3. Hyperlipidemia: On Repatha therapy LDL 69 February 2019  4.   Dizziness with positional change: Not orthostatic, has been present since initial CVA no change in cardiovascular medication as needed. Follow-up after above testing is complete    Susu Ring NP      Please note that this dictation was completed with [employer], the computer voice recognition software. Quite often unanticipated grammatical, syntax, homophones, and other interpretive errors are inadvertently transcribed by the computer software. Please disregard these errors. Please excuse any errors that have escaped final proofreading. Thank you. Patient seen and examined by me with nurse practitioner. I personally performed all components of the history, physical, and medical decision making and agree with the assessment and plan as noted. 1.  Atherosclerotic heart disease: PTCA stenting 2012. 2.  Hypertension: controlled.      Antonio Mcdonough MD

## 2019-10-28 NOTE — PROGRESS NOTES
1. Have you been to the ER, urgent care clinic since your last visit? Hospitalized since your last visit? No    2. Have you seen or consulted any other health care providers outside of the [address] [place] [address] since your last visit? Include any pap smears or colon screening. No    Chief Complaint   Patient presents with    Coronary Artery Disease     6 mo appt. C/O SOB with exertion. C/O dizziness when starting to stand up.

## 2019-11-14 ENCOUNTER — TELEPHONE (OUTPATIENT)
Dept: CARDIOLOGY CLINIC | Age: 49
End: 2019-11-14

## 2019-11-14 NOTE — TELEPHONE ENCOUNTER
----- Message from Ibis Perez MD sent at 11/14/2019  8:15 AM EST -----  Inform him stress test and Echo is k. Called pt's wife , [place] Benavides on [place] and left message to call me back. Wife called me back, [place] Benavides on [place], advised her that stress test and echo are both okay. She verbalized understanding.

## 2019-11-27 NOTE — TELEPHONE ENCOUNTER
Requested Prescriptions     Pending Prescriptions Disp Refills    mirabegron ER (MYRBETRIQ) 50 mg ER tablet       Sig: Take 1 Tab by mouth daily.

## 2019-12-02 NOTE — TELEPHONE ENCOUNTER
Last office visit:  10/14/2019  Last filled:  Myrbetriq  50mg 1 tab daily (historical med)    No follow up scheduled

## 2019-12-02 NOTE — TELEPHONE ENCOUNTER
Pts caretaker said that the PCP can do this prescription since he is doing fine. She said he wouldn't need to see them unless he had a problem.

## 2019-12-10 ENCOUNTER — DOCUMENTATION ONLY (OUTPATIENT)
Dept: INTERNAL MEDICINE CLINIC | Age: 49
End: 2019-12-10

## 2019-12-10 NOTE — PROGRESS NOTES
12/10/19 PA rejected for Myrbetriq 50 mg er tablets. PA initiated and submitted thru cover my med's. Claim pending outcome. PA ID: [id number], # [id number]. If no response within 24 hours, may contact plan @ [phone] .

## 2019-12-11 ENCOUNTER — DOCUMENTATION ONLY (OUTPATIENT)
Dept: INTERNAL MEDICINE CLINIC | Age: 49
End: 2019-12-11

## 2019-12-11 NOTE — PROGRESS NOTES
12/11/19 [place] : Tomas CAMILO Case: [id number], Status: Approved, Coverage Starts on: 12/10/2019 12:00:00 AM, Coverage Ends on: 12/9/2020 12:00:00 AM.  Augusto Hurd called approval @  [phone] spoke with Rubia Perez she state I will get it ready for him,0 out of pocket cost.

## 2019-12-18 ENCOUNTER — OFFICE VISIT (OUTPATIENT)
Dept: INTERNAL MEDICINE CLINIC | Age: 49
End: 2019-12-18

## 2019-12-18 VITALS
RESPIRATION RATE: 16 BRPM | BODY MASS INDEX: 37.8 KG/M2 | OXYGEN SATURATION: 97 % | HEIGHT: 70 IN | SYSTOLIC BLOOD PRESSURE: 137 MMHG | HEART RATE: 86 BPM | DIASTOLIC BLOOD PRESSURE: 82 MMHG | WEIGHT: 264 LBS | TEMPERATURE: 97.5 F

## 2019-12-18 DIAGNOSIS — K62.89 RECTAL PAIN: Primary | ICD-10-CM

## 2019-12-18 DIAGNOSIS — R39.198 DIFFICULTY IN URINATION: ICD-10-CM

## 2019-12-18 DIAGNOSIS — I69.354 HEMIPARESIS AFFECTING LEFT SIDE AS LATE EFFECT OF CEREBROVASCULAR ACCIDENT (CVA) (HCC): ICD-10-CM

## 2019-12-18 DIAGNOSIS — K64.1 GRADE II HEMORRHOIDS: ICD-10-CM

## 2019-12-18 LAB
BILIRUB UR QL STRIP: NEGATIVE
GLUCOSE UR-MCNC: NEGATIVE MG/DL
KETONES P FAST UR STRIP-MCNC: NEGATIVE MG/DL
PH UR STRIP: 7 [PH] (ref 4.6–8)
PROT UR QL STRIP: NEGATIVE
SP GR UR STRIP: 1.01 (ref 1–1.03)
UA UROBILINOGEN AMB POC: NORMAL (ref 0.2–1)
URINALYSIS CLARITY POC: CLEAR
URINALYSIS COLOR POC: NORMAL
URINE BLOOD POC: NEGATIVE
URINE LEUKOCYTES POC: NEGATIVE
URINE NITRITES POC: NEGATIVE

## 2019-12-18 RX ORDER — SULFAMETHOXAZOLE AND TRIMETHOPRIM 400; 80 MG/1; MG/1
1 TABLET ORAL 2 TIMES DAILY
Qty: 14 TAB | Refills: 0 | Status: SHIPPED | OUTPATIENT
Start: 2019-12-18 | End: 2019-12-25

## 2019-12-18 RX ORDER — NAPROXEN 500 MG/1
500 TABLET ORAL 2 TIMES DAILY WITH MEALS
Qty: 60 TAB | Refills: 0 | OUTPATIENT
Start: 2019-12-18 | End: 2019-12-22

## 2019-12-18 RX ORDER — HYDROCORTISONE 25 MG/G
CREAM TOPICAL 4 TIMES DAILY
Qty: 30 G | Refills: 0 | Status: SHIPPED | OUTPATIENT
Start: 2019-12-18

## 2019-12-18 NOTE — PROGRESS NOTES
HISTORY OF PRESENT ILLNESS  Delores Spivey is a 46 y.o. male. Rectal Pain   The history is provided by the patient and caregiver. This is a new problem. The current episode started more than 2 days ago. The problem occurs daily. The problem has been gradually worsening. Pertinent negatives include no chest pain and no abdominal pain. Treatments tried: stool softener. The treatment provided mild relief. Lots of pain and discomfort down in the rectal/lower abdo/back area. Feels something there. Hurts to have BM or any contact. Has difficulty with urinating but able to give us a sample    Current Outpatient Medications   Medication Sig Dispense Refill    mirabegron ER (MYRBETRIQ) 50 mg ER tablet Take 1 Tab by mouth daily. 30 Tab 5    evolocumab (REPATHA SURECLICK) pen injection Inject 140 mg under the skin every 2 weeks 6 Pen 4    topiramate (TOPAMAX) 50 mg tablet TAKE ONE TABLET BY MOUTH TWICE DAILY 180 Tab 3    ranolazine ER (RANEXA) 1,000 mg Take 1 Tab by mouth two (2) times a day. 180 Tab 3    pantoprazole (PROTONIX) 40 mg tablet Take 1 Tab by mouth daily. 90 Tab 3    isosorbide mononitrate ER (IMDUR) 60 mg CR tablet Take 1 Tab by mouth every morning. 90 Tab 3    metoprolol tartrate (LOPRESSOR) 25 mg tablet TAKE ONE-HALF TABLET BY MOUTH TWICE DAILY 90 Tab 3    citalopram (CELEXA) 20 mg tablet TAKE 1 & 1/2 (ONE & ONE-HALF) TABLETS BY MOUTH ONCE DAILY 135 Tab 3    clopidogrel (PLAVIX) 75 mg tab TAKE 1 TABLET BY MOUTH ONCE DAILY 90 Tab 3    mirabegron ER (MYRBETRIQ) 50 mg ER tablet Take 50 mg by mouth daily.  gabapentin (NEURONTIN) 300 mg capsule Take 300 mg by mouth two (2) times a day. 300 QAM & 600 QPM      aspirin delayed-release 81 mg tablet Take 1 Tab by mouth daily. 90 Tab 3    fexofenadine (ALLEGRA) 180 mg tablet Take  by mouth daily.  omega-3 fatty acids-vitamin e (FISH OIL) 1,000 mg cap Take 1 Cap by mouth daily. Review of Systems   Constitutional: Positive for chills. Cardiovascular: Negative for chest pain. Gastrointestinal: Negative for abdominal pain, blood in stool, constipation and diarrhea. Genitourinary: Negative for dysuria and hematuria. Neurological: Negative for focal weakness. Social History     Socioeconomic History    Marital status: SINGLE     Spouse name: Not on file    Number of children: 0    Years of education: Not on file    Highest education level: Not on file   Occupational History    Occupation: disabled     Employer: NOT EMPLOYED   Social Needs    Financial resource strain: Not on file    Food insecurity:     Worry: Not on file     Inability: Not on file    Transportation needs:     Medical: Not on file     Non-medical: Not on file   Tobacco Use    Smoking status: Former Smoker     Packs/day: 0.30     Years: 30.00     Pack years: 9.00     Types: Cigarettes     Last attempt to quit: 2011     Years since quittin.2    Smokeless tobacco: Never Used   Substance and Sexual Activity    Alcohol use: No     Alcohol/week: 0.0 standard drinks    Drug use: No    Sexual activity: Not Currently   Lifestyle    Physical activity:     Days per week: Not on file     Minutes per session: Not on file    Stress: Not on file   Relationships    Social connections:     Talks on phone: Not on file     Gets together: Not on file     Attends Hindu service: Not on file     Active member of club or organization: Not on file     Attends meetings of clubs or organizations: Not on file     Relationship status: Not on file    Intimate partner violence:     Fear of current or ex partner: Not on file     Emotionally abused: Not on file     Physically abused: Not on file     Forced sexual activity: Not on file   Other Topics Concern    Not on file   Social History Narrative    Lives on his own friends and relatives look in on him.        Physical Exam  Visit Vitals  /82 (BP 1 Location: Right arm, BP Patient Position: Sitting)   Pulse 86   Temp 97.5 °F (36.4 °C) (Temporal)   Resp 16   Ht 5' 10\" (1.778 m)   Wt 264 lb (119.7 kg)   SpO2 97%   BMI 37.88 kg/m²     WD WN male NAD very uncomfortable left side upper and lower hemiparesis  Heart RRR without murmers clicks or rubs  Lungs CTA  Abdo soft nontender  Rectal hemorroid 2 cm 6 to 8 not thrombosed entire area is red and irritated and tender to touch  Ext no edema    ASSESSMENT and PLAN  Encounter Diagnoses   Name Primary?  Rectal pain Yes    Difficulty in urination     Grade II hemorrhoids     Hemiparesis affecting left side as late effect of cerebrovascular accident (CVA) ([facility] [address]      Consider prostatitis but rectal would be painful, start Ab should help and Rx poss cellulitis. Npo UTI on UA    Orders Placed This Encounter    AMB POC URINALYSIS DIP STICK AUTO W/ MICRO    trimethoprim-sulfamethoxazole (BACTRIM, SEPTRA)  mg per tablet    hydrocortisone (ANUSOL-HC) 2.5 % rectal cream    naproxen (NAPROSYN) 500 mg tablet     Follow-up and Dispositions    · Return in about 5 days (around 12/23/2019) for routine follow up.

## 2019-12-18 NOTE — PROGRESS NOTES
Chief Complaint   Patient presents with    Rectal Pain     pt cannot stand to void or have a BM,  hemrrhoid in and out of rectum, painful, warm to touch, fever, chills, no meds taken, weakness, dizziness     I have reviewed the patient's medical history in detail and updated the computerized patient record. Health Maintenance reviewed. 1. Have you been to the ER, urgent care clinic since your last visit? Hospitalized since your last visit?no    2. Have you seen or consulted any other health care providers outside of the [address] [place] [address] since your last visit? Include any pap smears or colon screening. No      Encouraged pt to discuss pt's wishes with spouse/partner/family and bring them in the next appt to follow thru with the Advanced Directive    Fall Risk Assessment, last 12 mths 1/9/2018   Able to walk? Yes   Fall in past 12 months? No   Fall with injury? -   Number of falls in past 12 months -   Fall Risk Score -       3 most recent PHQ Screens 2/4/2019   Little interest or pleasure in doing things Not at all   Feeling down, depressed, irritable, or hopeless Not at all   Total Score PHQ 2 0   Trouble falling or staying asleep, or sleeping too much -   Feeling tired or having little energy -   Poor appetite, weight loss, or overeating -   Feeling bad about yourself - or that you are a failure or have let yourself or your family down -   Trouble concentrating on things such as school, work, reading, or watching TV -   Moving or speaking so slowly that other people could have noticed; or the opposite being so fidgety that others notice -   Thoughts of being better off dead, or hurting yourself in some way -   How difficult have these problems made it for you to do your work, take care of your home and get along with others -       Abuse Screening Questionnaire 2/4/2019   Do you ever feel afraid of your partner? N   Are you in a relationship with someone who physically or mentally threatens you?  Joseph Love Is it safe for you to go home?  Y       ADL Assessment 2/4/2019   Feeding yourself No Help Needed   Getting from bed to chair No Help Needed   Getting dressed No Help Needed   Bathing or showering No Help Needed   Walk across the room (includes cane/walker) No Help Needed   Using the telphone No Help Needed   Taking your medications No Help Needed   Preparing meals No Help Needed   Managing money (expenses/bills) No Help Needed   Moderately strenuous housework (laundry) No Help Needed   Shopping for personal items (toiletries/medicines) No Help Needed   Shopping for groceries No Help Needed   Driving No Help Needed   Climbing a flight of stairs No Help Needed   Getting to places beyond walking distances No Help Needed

## 2019-12-18 NOTE — PATIENT INSTRUCTIONS
Hemorrhoids: Care Instructions Your Care Instructions Hemorrhoids are enlarged veins that develop in the anal canal. Bleeding during bowel movements, itching, swelling, and rectal pain are the most common symptoms. They can be uncomfortable at times, but hemorrhoids rarely are a serious problem. You can treat most hemorrhoids with simple changes to your diet and bowel habits. These changes include eating more fiber and not straining to pass stools. Most hemorrhoids do not need surgery or other treatment unless they are very large and painful or bleed a lot. Follow-up care is a key part of your treatment and safety. Be sure to make and go to all appointments, and call your doctor if you are having problems. It's also a good idea to know your test results and keep a list of the medicines you take. How can you care for yourself at home? · Sit in a few inches of warm water (sitz bath) 3 times a day and after bowel movements. The warm water helps with pain and itching. · Put ice on your anal area several times a day for 10 minutes at a time. Put a thin cloth between the ice and your skin. Follow this by placing a warm, wet towel on the area for another 10 to 20 minutes. · Take pain medicines exactly as directed. ? If the doctor gave you a prescription medicine for pain, take it as prescribed. ? If you are not taking a prescription pain medicine, ask your doctor if you can take an over-the-counter medicine. · Keep the anal area clean, but be gentle. Use water and a fragrance-free soap, such as [place], or use baby wipes or medicated pads, such as Tucks. · Wear cotton underwear and loose clothing to decrease moisture in the anal area. · Eat more fiber. Include foods such as whole-grain breads and cereals, raw vegetables, raw and dried fruits, and beans. · Drink plenty of fluids, enough so that your urine is light yellow or clear like water.  If you have kidney, heart, or liver disease and have to limit fluids, talk with your doctor before you increase the amount of fluids you drink. · Use a stool softener that contains bran or psyllium. You can save money by buying bran or psyllium (available in bulk at most health food stores) and sprinkling it on foods or stirring it into fruit juice. Or you can use a product such as Metamucil or Hydrocil. · Practice healthy bowel habits. ? Go to the bathroom as soon as you have the urge. ? Avoid straining to pass stools. Relax and give yourself time to let things happen naturally. ? Do not hold your breath while passing stools. ? Do not read while sitting on the toilet. Get off the toilet as soon as you have finished. · Take your medicines exactly as prescribed. Call your doctor if you think you are having a problem with your medicine. When should you call for help? Call 911 anytime you think you may need emergency care. For example, call if: 
  · You pass maroon or very bloody stools.  
 Call your doctor now or seek immediate medical care if: 
  · You have increased pain.  
  · You have increased bleeding.  
 Watch closely for changes in your health, and be sure to contact your doctor if: 
  · Your symptoms have not improved after 3 or 4 days. Where can you learn more? Go to http://etta-fer.info/. Enter F228 in the search box to learn more about \"Hemorrhoids: Care Instructions. \" Current as of: November 7, 2018 Content Version: 12.2 © 9987-3977 [employer]. Care instructions adapted under license by [employer] (which disclaims liability or warranty for this information). If you have questions about a medical condition or this instruction, always ask your healthcare professional. [place] [address] any warranty or liability for your use of this information.

## 2019-12-22 ENCOUNTER — HOSPITAL ENCOUNTER (EMERGENCY)
Age: 49
Discharge: HOME | End: 2019-12-22
Attending: EMERGENCY MEDICINE
Payer: MEDICAID

## 2019-12-22 ENCOUNTER — APPOINTMENT (OUTPATIENT)
Dept: CT IMAGING | Age: 49
End: 2019-12-22
Attending: PHYSICIAN ASSISTANT
Payer: MEDICAID

## 2019-12-22 VITALS
TEMPERATURE: 98.2 F | RESPIRATION RATE: 16 BRPM | OXYGEN SATURATION: 98 % | DIASTOLIC BLOOD PRESSURE: 71 MMHG | BODY MASS INDEX: 37.52 KG/M2 | HEART RATE: 72 BPM | SYSTOLIC BLOOD PRESSURE: 135 MMHG | WEIGHT: 268 LBS | HEIGHT: 71 IN

## 2019-12-22 DIAGNOSIS — K61.1 PERIRECTAL ABSCESS: Primary | ICD-10-CM

## 2019-12-22 LAB
ALBUMIN SERPL-MCNC: 3.3 G/DL (ref 3.4–5)
ALBUMIN/GLOB SERPL: 1 {RATIO} (ref 0.8–1.7)
ALP SERPL-CCNC: 64 U/L (ref 45–117)
ALT SERPL-CCNC: 38 U/L (ref 16–61)
ANION GAP SERPL CALC-SCNC: 8 MMOL/L (ref 3–18)
APPEARANCE UR: CLEAR
APTT PPP: 44.3 SEC (ref 23–36.4)
AST SERPL-CCNC: 33 U/L (ref 10–38)
BASOPHILS # BLD: 0 K/UL (ref 0–0.1)
BASOPHILS NFR BLD: 0 % (ref 0–2)
BILIRUB SERPL-MCNC: 0.5 MG/DL (ref 0.2–1)
BILIRUB UR QL: NEGATIVE
BUN SERPL-MCNC: 8 MG/DL (ref 7–18)
BUN/CREAT SERPL: 7 (ref 12–20)
CALCIUM SERPL-MCNC: 8.5 MG/DL (ref 8.5–10.1)
CHLORIDE SERPL-SCNC: 111 MMOL/L (ref 100–111)
CO2 SERPL-SCNC: 24 MMOL/L (ref 21–32)
COLOR UR: YELLOW
CREAT SERPL-MCNC: 1.1 MG/DL (ref 0.6–1.3)
DIFFERENTIAL METHOD BLD: ABNORMAL
EOSINOPHIL # BLD: 0 K/UL (ref 0–0.4)
EOSINOPHIL NFR BLD: 1 % (ref 0–5)
ERYTHROCYTE [DISTWIDTH] IN BLOOD BY AUTOMATED COUNT: 12.7 % (ref 11.6–14.5)
GLOBULIN SER CALC-MCNC: 3.2 G/DL (ref 2–4)
GLUCOSE SERPL-MCNC: 91 MG/DL (ref 74–99)
GLUCOSE UR STRIP.AUTO-MCNC: NEGATIVE MG/DL
HCT VFR BLD AUTO: 37.2 % (ref 36–48)
HGB BLD-MCNC: 12.9 G/DL (ref 13–16)
HGB UR QL STRIP: NEGATIVE
INR PPP: 1.1 (ref 0.8–1.2)
KETONES UR QL STRIP.AUTO: NEGATIVE MG/DL
LEUKOCYTE ESTERASE UR QL STRIP.AUTO: NEGATIVE
LYMPHOCYTES # BLD: 2.2 K/UL (ref 0.9–3.6)
LYMPHOCYTES NFR BLD: 29 % (ref 21–52)
MCH RBC QN AUTO: 34.1 PG (ref 24–34)
MCHC RBC AUTO-ENTMCNC: 34.7 G/DL (ref 31–37)
MCV RBC AUTO: 98.4 FL (ref 74–97)
MONOCYTES # BLD: 0.8 K/UL (ref 0.05–1.2)
MONOCYTES NFR BLD: 11 % (ref 3–10)
NEUTS SEG # BLD: 4.7 K/UL (ref 1.8–8)
NEUTS SEG NFR BLD: 59 % (ref 40–73)
NITRITE UR QL STRIP.AUTO: NEGATIVE
PH UR STRIP: 7 [PH] (ref 5–8)
PLATELET # BLD AUTO: 214 K/UL (ref 135–420)
PMV BLD AUTO: 10.3 FL (ref 9.2–11.8)
POTASSIUM SERPL-SCNC: 3.7 MMOL/L (ref 3.5–5.5)
PROT SERPL-MCNC: 6.5 G/DL (ref 6.4–8.2)
PROT UR STRIP-MCNC: NEGATIVE MG/DL
PROTHROMBIN TIME: 14.4 SEC (ref 11.5–15.2)
RBC # BLD AUTO: 3.78 M/UL (ref 4.7–5.5)
SODIUM SERPL-SCNC: 143 MMOL/L (ref 136–145)
SP GR UR REFRACTOMETRY: 1.01 (ref 1–1.03)
UROBILINOGEN UR QL STRIP.AUTO: 1 EU/DL (ref 0.2–1)
WBC # BLD AUTO: 7.8 K/UL (ref 4.6–13.2)

## 2019-12-22 PROCEDURE — 85730 THROMBOPLASTIN TIME PARTIAL: CPT

## 2019-12-22 PROCEDURE — 72192 CT PELVIS W/O DYE: CPT

## 2019-12-22 PROCEDURE — 85610 PROTHROMBIN TIME: CPT

## 2019-12-22 PROCEDURE — 74011250636 HC RX REV CODE- 250/636: Performed by: PHYSICIAN ASSISTANT

## 2019-12-22 PROCEDURE — 51701 INSERT BLADDER CATHETER: CPT

## 2019-12-22 PROCEDURE — 87186 SC STD MICRODIL/AGAR DIL: CPT

## 2019-12-22 PROCEDURE — 75810000115

## 2019-12-22 PROCEDURE — 99283 EMERGENCY DEPT VISIT LOW MDM: CPT

## 2019-12-22 PROCEDURE — 87070 CULTURE OTHR SPECIMN AEROBIC: CPT

## 2019-12-22 PROCEDURE — 85025 COMPLETE CBC W/AUTO DIFF WBC: CPT

## 2019-12-22 PROCEDURE — 87077 CULTURE AEROBIC IDENTIFY: CPT

## 2019-12-22 PROCEDURE — 80053 COMPREHEN METABOLIC PANEL: CPT

## 2019-12-22 PROCEDURE — 81003 URINALYSIS AUTO W/O SCOPE: CPT

## 2019-12-22 RX ORDER — OXYCODONE AND ACETAMINOPHEN 5; 325 MG/1; MG/1
1 TABLET ORAL
Qty: 12 TAB | Refills: 0 | Status: SHIPPED | OUTPATIENT
Start: 2019-12-22 | End: 2019-12-27

## 2019-12-22 RX ADMIN — SODIUM CHLORIDE 1000 ML: 900 INJECTION, SOLUTION INTRAVENOUS at 13:12

## 2019-12-22 NOTE — ED PROVIDER NOTES
EMERGENCY DEPARTMENT HISTORY AND PHYSICAL EXAM    Date: 12/22/2019  Patient Name: Yolanda Burciaga    History of Presenting Illness     Chief Complaint   Patient presents with    Hemorrhoids    Rash         History Provided By: Patient    Chief Complaint: hemorrhoid    HPI(Context):   12:16 PM  Yolanda Burciaga is a 46 y.o. male with PMHX of CVA with left sided residual deficit, CAD, GERD, HTN who presents to the emergency department C/O hemorrhoid with rectal pain. Pt reports sxs x one week. Pt saw PCP who Rx bactrim and topical hemorrhoid cream. Area is red and TTP. Pt is on Plavix. Pt denies fever, chills, vomiting, and any other sxs or complaints. PCP: Kip Marroquin MD    Current Outpatient Medications   Medication Sig Dispense Refill    oxyCODONE-acetaminophen (PERCOCET) 5-325 mg per tablet Take 1 Tab by mouth every four (4) hours as needed for Pain for up to 5 days. Max Daily Amount: 6 Tabs. 12 Tab 0    trimethoprim-sulfamethoxazole (BACTRIM, SEPTRA)  mg per tablet Take 1 Tab by mouth two (2) times a day for 7 days. 14 Tab 0    hydrocortisone (ANUSOL-HC) 2.5 % rectal cream Insert  into rectum four (4) times daily. 30 g 0    mirabegron ER (MYRBETRIQ) 50 mg ER tablet Take 1 Tab by mouth daily. 30 Tab 5    evolocumab (REPATHA SURECLICK) pen injection Inject 140 mg under the skin every 2 weeks 6 Pen 4    topiramate (TOPAMAX) 50 mg tablet TAKE ONE TABLET BY MOUTH TWICE DAILY 180 Tab 3    ranolazine ER (RANEXA) 1,000 mg Take 1 Tab by mouth two (2) times a day. 180 Tab 3    pantoprazole (PROTONIX) 40 mg tablet Take 1 Tab by mouth daily. 90 Tab 3    isosorbide mononitrate ER (IMDUR) 60 mg CR tablet Take 1 Tab by mouth every morning.  90 Tab 3    metoprolol tartrate (LOPRESSOR) 25 mg tablet TAKE ONE-HALF TABLET BY MOUTH TWICE DAILY 90 Tab 3    citalopram (CELEXA) 20 mg tablet TAKE 1 & 1/2 (ONE & ONE-HALF) TABLETS BY MOUTH ONCE DAILY 135 Tab 3    clopidogrel (PLAVIX) 75 mg tab TAKE 1 TABLET BY MOUTH ONCE DAILY 90 Tab 3    gabapentin (NEURONTIN) 300 mg capsule Take 300 mg by mouth two (2) times a day. 300 QAM & 600 QPM      aspirin delayed-release 81 mg tablet Take 1 Tab by mouth daily. 90 Tab 3    omega-3 fatty acids-vitamin e (FISH OIL) 1,000 mg cap Take 1 Cap by mouth daily.  fexofenadine (ALLEGRA) 180 mg tablet Take  by mouth daily. Past History     Past Medical History:  Past Medical History:   Diagnosis Date    CAD (coronary artery disease)     GERD (gastroesophageal reflux disease)     Headache(784.0)     Hypertension     Stroke ([address]        Past Surgical History:  Past Surgical History:   Procedure Laterality Date    CARDIAC SURG PROCEDURE UNLIST      HX CORONARY STENT PLACEMENT         Family History:  Family History   Problem Relation Age of Onset    Heart Disease Mother     Hypertension Mother     Diabetes Father     Heart Disease Father        Social History:  Social History     Tobacco Use    Smoking status: Former Smoker     Packs/day: 0.30     Years: 30.00     Pack years: 9.00     Types: Cigarettes     Last attempt to quit: 2011     Years since quittin.2    Smokeless tobacco: Never Used   Substance Use Topics    Alcohol use: No     Alcohol/week: 0.0 standard drinks    Drug use: No       Allergies: Allergies   Allergen Reactions    Crestor [Rosuvastatin] Rash    Iodinated Contrast Media Hives    Statins-Hmg-Coa Reductase Inhibitors Myalgia         Review of Systems   Review of Systems   Constitutional: Negative for fever. Gastrointestinal: Negative for nausea and vomiting. Skin:        Anal pain. Allergic/Immunologic: Negative for immunocompromised state. All other systems reviewed and are negative.       Physical Exam     Vitals:    19 1137 19 1646   BP: 148/86 135/71   Pulse: 69 72   Resp: 16 16   Temp: 98.2 °F (36.8 °C)    SpO2: 99% 98%   Weight: 121.6 kg (268 lb)    Height: 5' 11\" (1.803 m)      Physical Exam  Vitals signs and nursing note reviewed. Constitutional:       Appearance: Normal appearance. Comments:  male in NAD. Alert. Appears uncomfortable. Wife at bedside. HENT:      Head: Normocephalic and atraumatic. Nose: Nose normal.   Neck:      Musculoskeletal: Normal range of motion. Cardiovascular:      Rate and Rhythm: Normal rate and regular rhythm. Pulmonary:      Effort: Pulmonary effort is normal.      Breath sounds: Normal breath sounds. Genitourinary:     Rectum: Tenderness present. No external hemorrhoid. Musculoskeletal: Normal range of motion. Neurological:      Mental Status: He is alert. Diagnostic Study Results     Labs -     Recent Results (from the past 12 hour(s))   CBC WITH AUTOMATED DIFF    Collection Time: 12/22/19 12:55 PM   Result Value Ref Range    WBC 7.8 4.6 - 13.2 K/uL    RBC 3.78 (L) 4.70 - 5.50 M/uL    HGB 12.9 (L) 13.0 - 16.0 g/dL    HCT 37.2 36.0 - 48.0 %    MCV 98.4 (H) 74.0 - 97.0 FL    MCH 34.1 (H) 24.0 - 34.0 PG    MCHC 34.7 31.0 - 37.0 g/dL    RDW 12.7 11.6 - 14.5 %    PLATELET 669 876 - 439 K/uL    MPV 10.3 9.2 - 11.8 FL    NEUTROPHILS 59 40 - 73 %    LYMPHOCYTES 29 21 - 52 %    MONOCYTES 11 (H) 3 - 10 %    EOSINOPHILS 1 0 - 5 %    BASOPHILS 0 0 - 2 %    ABS. NEUTROPHILS 4.7 1.8 - 8.0 K/UL    ABS. LYMPHOCYTES 2.2 0.9 - 3.6 K/UL    ABS. MONOCYTES 0.8 0.05 - 1.2 K/UL    ABS. EOSINOPHILS 0.0 0.0 - 0.4 K/UL    ABS.  BASOPHILS 0.0 0.0 - 0.1 K/UL    DF AUTOMATED     METABOLIC PANEL, COMPREHENSIVE    Collection Time: 12/22/19 12:55 PM   Result Value Ref Range    Sodium 143 136 - 145 mmol/L    Potassium 3.7 3.5 - 5.5 mmol/L    Chloride 111 100 - 111 mmol/L    CO2 24 21 - 32 mmol/L    Anion gap 8 3.0 - 18 mmol/L    Glucose 91 74 - 99 mg/dL    BUN 8 7.0 - 18 MG/DL    Creatinine 1.10 0.6 - 1.3 MG/DL    BUN/Creatinine ratio 7 (L) 12 - 20      GFR est AA >60 >60 ml/min/1.73m2    GFR est non-AA >60 >60 ml/min/1.73m2    Calcium 8.5 8.5 - 10.1 MG/DL    Bilirubin, total 0.5 0.2 - 1.0 MG/DL    ALT (SGPT) 38 16 - 61 U/L    AST (SGOT) 33 10 - 38 U/L    Alk. phosphatase 64 45 - 117 U/L    Protein, total 6.5 6.4 - 8.2 g/dL    Albumin 3.3 (L) 3.4 - 5.0 g/dL    Globulin 3.2 2.0 - 4.0 g/dL    A-G Ratio 1.0 0.8 - 1.7     PROTHROMBIN TIME + INR    Collection Time: 12/22/19 12:55 PM   Result Value Ref Range    Prothrombin time 14.4 11.5 - 15.2 sec    INR 1.1 0.8 - 1.2     PTT    Collection Time: 12/22/19 12:55 PM   Result Value Ref Range    aPTT 44.3 (H) 23.0 - 36.4 SEC   URINALYSIS W/ RFLX MICROSCOPIC    Collection Time: 12/22/19  3:00 PM   Result Value Ref Range    Color YELLOW      Appearance CLEAR      Specific gravity 1.007 1.005 - 1.030      pH (UA) 7.0 5.0 - 8.0      Protein NEGATIVE  NEG mg/dL    Glucose NEGATIVE  NEG mg/dL    Ketone NEGATIVE  NEG mg/dL    Bilirubin NEGATIVE  NEG      Blood NEGATIVE  NEG      Urobilinogen 1.0 0.2 - 1.0 EU/dL    Nitrites NEGATIVE  NEG      Leukocyte Esterase NEGATIVE  NEG           CT PELV WO CONT   Final Result   IMPRESSION:   1. Somewhat tubular appearing fluid containing soft tissue density in the   superior margin of the left gluteal fold compatible with soft tissue perirectal   infection. No evidence of deep or intrapelvic extension. CT Results  (Last 48 hours)               12/22/19 1420  CT PELV WO CONT Final result    Impression:  IMPRESSION:   1. Somewhat tubular appearing fluid containing soft tissue density in the   superior margin of the left gluteal fold compatible with soft tissue perirectal   infection. No evidence of deep or intrapelvic extension. Narrative:  Examination: CT pelvis without contrast.       HISTORY: 58-year-old patient with rectal pain related to hemorrhoids. TECHNIQUE: CT imaging of the pelvis was performed in the axial plane utilizing   both bone and soft tissue reconstruction algorithms.  Additional coronal and   sagittal reformatted images were performed by the technologist and are included   in interpretation. COMPARISON: No prior imaging. One or more dose reduction techniques were used on this CT: automated exposure   control, adjustment of the mAs and/or kVp according to patient size, and   iterative reconstruction techniques. The specific techniques used on this CT   exam have been documented in the patient's electronic medical record. Digital   Imaging and Communications in Medicine (DICOM) format image data are available   to nonaffiliated external healthcare facilities or entities on a secure, media   free, reciprocally searchable basis with patient authorization for at least a   12-month period after this study. FINDINGS:       Somewhat pedunculated appearing region of soft tissue density extending   inferiorly from the rectum with small air-fluid level noted in mild adjacent   soft tissue stranding at the apex of the gluteal crease. No evidence of   extension into the adjacent ischial rectal fossa. Prostate and urinary bladder   as well as bilateral seminal vesicles appear within normal limits. Small and large bowel appear normal course and caliber. Scattered colonic   diverticula without diverticulitis. Normal appendix. Scattered aortobiiliac atherosclerotic vascular calcification without evidence   of aneurysmal dilatation. No adenopathy. No free pelvic fluid. Review of osseous windows demonstrate no acute abnormality or suspicious lytic   or blastic lesions. Mild lower lumbar spondylosis and facet joint osteoarthritis   L5-S1. CXR Results  (Last 48 hours)    None          Medications given in the ED-  Medications   sodium chloride 0.9 % bolus infusion 1,000 mL (0 mL IntraVENous IV Completed 12/22/19 1599)         Medical Decision Making   I am the first provider for this patient.     I reviewed the vital signs, available nursing notes, past medical history, past surgical history, family history and social history. Vital Signs-Reviewed the patient's vital signs. Pulse Oximetry Analysis - 98% on RA     Records Reviewed: Nursing Notes    Provider Notes (Medical Decision Making): perirectal abscess, anal fissure, fistula, hemorrhoid. Procedures:  I&D Abcess Complex  Date/Time: 12/22/2019 4:05 PM  Performed by: Madeline Arroyo PA-C  Authorized by: Madeline Arroyo PA-C     Consent:     Consent obtained:  Verbal    Consent given by:  Patient    Risks discussed:  Pain and incomplete drainage    Alternatives discussed:  No treatment and alternative treatment  Location:     Type:  Abscess    Size:  2 cm x 2 cm    Location:  Anogenital    Anogenital location:  Perirectal  Pre-procedure details:     Skin preparation:  Betadine  Anesthesia (see MAR for exact dosages): Anesthesia method:  Local infiltration    Local anesthetic:  Lidocaine 1% w/o epi  Procedure type:     Complexity:  Complex  Procedure details:     Needle aspiration: no      Incision types:  Single straight    Incision depth:  Dermal    Scalpel blade:  11    Wound management:  Irrigated with saline, probed and deloculated and extensive cleaning    Drainage:  Purulent    Drainage amount:  Copious    Wound treatment:  Wound left open    Packing materials:  1/2 in iodoform gauze  Post-procedure details:     Patient tolerance of procedure: Tolerated well, no immediate complications        ED Course:   12:16 PM Initial assessment performed. The patients presenting problems have been discussed, and they are in agreement with the care plan formulated and outlined with them. I have encouraged them to ask questions as they arise throughout their visit. FACE-TO-FACE PROGRESS NOTE:  12:52 PM  The patient presents with rectal pain for several days. Medication and Bactrim. My exam shows 1 external hemorrhoid, a erythematous fluctuant 3 cm circular area on the left gluteal area just lateral to the anus which is tender to palpation.   Imp/plan: Likely perirectal abscess, agree with plan to get CT scan if no large or poor shoe shaped fluid collection and no deep fluid collection will likely be suitable for I&D in the ED. I have personally seen and examined the patient, reviewed the KAILEE's note and agree with findings and plan. Written by Kofi Iyer MD.      Diagnosis and Disposition       CT performed with no evidence of deep infectious process. Successful I&D of perirectal abscess with copious malodorous drainage. Irrigated well. Packed. RTED in 48 hours for wound check with packing removed at that time and SITZ baths TID x 10 minutes until healed. Pt on bactrim from PCP. Finish medication. Wound culture in process. Reasons to RTED discussed with pt. All questions answered. Pt feels comfortable going home at this time. Pt expressed understanding and he agrees with plan. 1. Perirectal abscess        PLAN:  1. D/C Home  2. Discharge Medication List as of 12/22/2019  4:33 PM      START taking these medications    Details   oxyCODONE-acetaminophen (PERCOCET) 5-325 mg per tablet Take 1 Tab by mouth every four (4) hours as needed for Pain for up to 5 days. Max Daily Amount: 6 Tabs., Print, Disp-12 Tab, R-0         CONTINUE these medications which have NOT CHANGED    Details   trimethoprim-sulfamethoxazole (BACTRIM, SEPTRA)  mg per tablet Take 1 Tab by mouth two (2) times a day for 7 days. , Normal, Disp-14 Tab, R-0      hydrocortisone (ANUSOL-HC) 2.5 % rectal cream Insert  into rectum four (4) times daily. , Normal, Disp-30 g, R-0      mirabegron ER (MYRBETRIQ) 50 mg ER tablet Take 1 Tab by mouth daily. , Print, Disp-30 Tab, R-5      evolocumab (REPATHA SURECLICK) pen injection Inject 140 mg under the skin every 2 weeks, Mail Order, Disp-6 Pen, R-4      topiramate (TOPAMAX) 50 mg tablet TAKE ONE TABLET BY MOUTH TWICE DAILY, Normal, Disp-180 Tab, R-3      ranolazine ER (RANEXA) 1,000 mg Take 1 Tab by mouth two (2) times a day., Normal, Disp-180 Tab, R-3      pantoprazole (PROTONIX) 40 mg tablet Take 1 Tab by mouth daily. , Normal, Disp-90 Tab, R-3      isosorbide mononitrate ER (IMDUR) 60 mg CR tablet Take 1 Tab by mouth every morning., NormalPlease consider 90 day supplies to promote better adherenceDisp-90 Tab, R-3      metoprolol tartrate (LOPRESSOR) 25 mg tablet TAKE ONE-HALF TABLET BY MOUTH TWICE DAILY, Normal, Disp-90 Tab, R-3      citalopram (CELEXA) 20 mg tablet TAKE 1 & 1/2 (ONE & ONE-HALF) TABLETS BY MOUTH ONCE DAILY, NormalPlease consider 90 day supplies to promote better adherenceDisp-135 Tab, R-3      clopidogrel (PLAVIX) 75 mg tab TAKE 1 TABLET BY MOUTH ONCE DAILY, NormalPlease consider 90 day supplies to promote better adherenceDisp-90 Tab, R-3      gabapentin (NEURONTIN) 300 mg capsule Take 300 mg by mouth two (2) times a day. 300 QAM & 600 QPM, Historical Med      aspirin delayed-release 81 mg tablet Take 1 Tab by mouth daily. , Normal, Disp-90 Tab, R-3      fexofenadine (ALLEGRA) 180 mg tablet Take  by mouth daily. , Historical Med      omega-3 fatty acids-vitamin e (FISH OIL) 1,000 mg cap Take 1 Cap by mouth daily. , Historical Med         STOP taking these medications       naproxen (NAPROSYN) 500 mg tablet Comments:   Reason for Stopping:             3.   Follow-up Information     Follow up With Specialties Details Why [address]    THE [facility] EMERGENCY DEPT Emergency Medicine  return for wound recheck in 48 hours 2 Alonso Peterson  [address], [address] [place] [address], [address] [place] [address]   [address]  [phone]          _______________________________    Attestations: This note is prepared by Luis Felipe Liu PA-C.  _______________________________      Please note that this dictation was completed with [email or web address], the [employer] voice recognition software.   Quite often unanticipated grammatical, syntax, homophones, and other interpretive errors are inadvertently transcribed by the computer software. Please disregard these errors. Please excuse any errors that have escaped final proofreading.

## 2019-12-22 NOTE — ED TRIAGE NOTES
Patient ambulatory to ED with c/o rectal pain from hemorrhoid. Patient also states \"it feels like there's a finger bulge hanging out from my hiney on the left side. \" Patient states area is red and painful. Patient went to his PCP Wednesday and pain has not improved.

## 2019-12-24 ENCOUNTER — HOSPITAL ENCOUNTER (EMERGENCY)
Age: 49
Discharge: HOME | End: 2019-12-24
Attending: EMERGENCY MEDICINE
Payer: MEDICAID

## 2019-12-24 VITALS
HEART RATE: 69 BPM | BODY MASS INDEX: 37.8 KG/M2 | TEMPERATURE: 98.5 F | HEIGHT: 70 IN | OXYGEN SATURATION: 100 % | DIASTOLIC BLOOD PRESSURE: 78 MMHG | RESPIRATION RATE: 16 BRPM | SYSTOLIC BLOOD PRESSURE: 129 MMHG | WEIGHT: 264 LBS

## 2019-12-24 DIAGNOSIS — Z51.89 WOUND CHECK, ABSCESS: Primary | ICD-10-CM

## 2019-12-24 PROCEDURE — 75810000275 HC EMERGENCY DEPT VISIT NO LEVEL OF CARE

## 2019-12-24 PROCEDURE — 99282 EMERGENCY DEPT VISIT SF MDM: CPT

## 2019-12-24 RX ORDER — SULFAMETHOXAZOLE AND TRIMETHOPRIM 800; 160 MG/1; MG/1
1 TABLET ORAL 2 TIMES DAILY
Qty: 14 TAB | Refills: 0 | Status: SHIPPED | OUTPATIENT
Start: 2019-12-24 | End: 2019-12-27

## 2019-12-24 NOTE — ED PROVIDER NOTES
EMERGENCY DEPARTMENT HISTORY AND PHYSICAL EXAM    Date: 12/24/2019  Patient Name: Henok Vaughn    History of Presenting Illness     Chief Complaint   Patient presents with    Wound Check         History Provided By: Patient    Henok Vaughn is a 46 y.o. male with PMHX of CVA & rectal abscess who presents to the emergency department C/O wound check. Patient was seen in this facility 2 days ago for incision and drainage of a perirectal abscess left buttock. Patient is tolerating antibiotics easily and has not felt like he needed to take the Percocet. Patient states he is feeling much better. Reports having some mild bleeding from site however is not having any difficulty moving his bowels or other complaints. Pt denies any other sxs or complaints. PCP: Ely Ramirez MD    Current Outpatient Medications   Medication Sig Dispense Refill    trimethoprim-sulfamethoxazole (BACTRIM DS) 160-800 mg per tablet Take 1 Tab by mouth two (2) times a day for 3 days. 14 Tab 0    oxyCODONE-acetaminophen (PERCOCET) 5-325 mg per tablet Take 1 Tab by mouth every four (4) hours as needed for Pain for up to 5 days. Max Daily Amount: 6 Tabs. 12 Tab 0    trimethoprim-sulfamethoxazole (BACTRIM, SEPTRA)  mg per tablet Take 1 Tab by mouth two (2) times a day for 7 days. 14 Tab 0    hydrocortisone (ANUSOL-HC) 2.5 % rectal cream Insert  into rectum four (4) times daily. 30 g 0    mirabegron ER (MYRBETRIQ) 50 mg ER tablet Take 1 Tab by mouth daily. 30 Tab 5    evolocumab (REPATHA SURECLICK) pen injection Inject 140 mg under the skin every 2 weeks 6 Pen 4    topiramate (TOPAMAX) 50 mg tablet TAKE ONE TABLET BY MOUTH TWICE DAILY 180 Tab 3    ranolazine ER (RANEXA) 1,000 mg Take 1 Tab by mouth two (2) times a day. 180 Tab 3    pantoprazole (PROTONIX) 40 mg tablet Take 1 Tab by mouth daily. 90 Tab 3    isosorbide mononitrate ER (IMDUR) 60 mg CR tablet Take 1 Tab by mouth every morning.  90 Tab 3    metoprolol tartrate (LOPRESSOR) 25 mg tablet TAKE ONE-HALF TABLET BY MOUTH TWICE DAILY 90 Tab 3    citalopram (CELEXA) 20 mg tablet TAKE 1 & 1/2 (ONE & ONE-HALF) TABLETS BY MOUTH ONCE DAILY 135 Tab 3    clopidogrel (PLAVIX) 75 mg tab TAKE 1 TABLET BY MOUTH ONCE DAILY 90 Tab 3    gabapentin (NEURONTIN) 300 mg capsule Take 300 mg by mouth two (2) times a day. 300 QAM & 600 QPM      aspirin delayed-release 81 mg tablet Take 1 Tab by mouth daily. 90 Tab 3    omega-3 fatty acids-vitamin e (FISH OIL) 1,000 mg cap Take 1 Cap by mouth daily.  fexofenadine (ALLEGRA) 180 mg tablet Take  by mouth daily. Past History     Past Medical History:  Past Medical History:   Diagnosis Date    CAD (coronary artery disease)     GERD (gastroesophageal reflux disease)     Headache(784.0)     Hypertension     Stroke ([address]        Past Surgical History:  Past Surgical History:   Procedure Laterality Date    CARDIAC SURG PROCEDURE UNLIST      HX CORONARY STENT PLACEMENT         Family History:  Family History   Problem Relation Age of Onset    Heart Disease Mother     Hypertension Mother     Diabetes Father     Heart Disease Father        Social History:  Social History     Tobacco Use    Smoking status: Former Smoker     Packs/day: 0.30     Years: 30.00     Pack years: 9.00     Types: Cigarettes     Last attempt to quit: 2011     Years since quittin.2    Smokeless tobacco: Never Used   Substance Use Topics    Alcohol use: No     Alcohol/week: 0.0 standard drinks    Drug use: No       Allergies: Allergies   Allergen Reactions    Crestor [Rosuvastatin] Rash    Iodinated Contrast Media Hives    Statins-Hmg-Coa Reductase Inhibitors Myalgia         Review of Systems   Review of Systems   Constitutional: Negative for fever. Skin: Positive for wound. All other systems reviewed and are negative.       Physical Exam     Vitals:    19 1307   BP: 129/78   Pulse: 69   Resp: 20   Temp: 98.5 °F (36.9 °C)   SpO2: 100%   Weight: 119.7 kg (264 lb)   Height: 5' 10\" (1.778 m)     Physical Exam  Vitals signs and nursing note reviewed. Constitutional:       Appearance: Normal appearance. HENT:      Head: Normocephalic and atraumatic. Nose: Nose normal.      Mouth/Throat:      Mouth: Mucous membranes are moist.   Eyes:      Extraocular Movements: Extraocular movements intact. Conjunctiva/sclera: Conjunctivae normal.      Pupils: Pupils are equal, round, and reactive to light. Skin:     General: Skin is warm and dry. Capillary Refill: Capillary refill takes less than 2 seconds. Comments: Left most medial buttock perirectal area with healing wound, packing has already been removed no expressible drainage or fluctuance noted still is mildly tender with palpation but appears to be healing very well. Neurological:      Mental Status: He is alert and oriented to person, place, and time. Psychiatric:         Mood and Affect: Mood normal.         Behavior: Behavior normal.           Diagnostic Study Results     Labs -   No results found for this or any previous visit (from the past 12 hour(s)). Radiologic Studies -  No orders to display     CT Results  (Last 48 hours)               12/22/19 1420  CT PELV WO CONT Final result    Impression:  IMPRESSION:   1. Somewhat tubular appearing fluid containing soft tissue density in the   superior margin of the left gluteal fold compatible with soft tissue perirectal   infection. No evidence of deep or intrapelvic extension. Narrative:  Examination: CT pelvis without contrast.       HISTORY: 52-year-old patient with rectal pain related to hemorrhoids. TECHNIQUE: CT imaging of the pelvis was performed in the axial plane utilizing   both bone and soft tissue reconstruction algorithms. Additional coronal and   sagittal reformatted images were performed by the technologist and are included   in interpretation. COMPARISON: No prior imaging.        One or more dose reduction techniques were used on this CT: automated exposure   control, adjustment of the mAs and/or kVp according to patient size, and   iterative reconstruction techniques. The specific techniques used on this CT   exam have been documented in the patient's electronic medical record. Digital   Imaging and Communications in Medicine (DICOM) format image data are available   to nonaffiliated external healthcare facilities or entities on a secure, media   free, reciprocally searchable basis with patient authorization for at least a   12-month period after this study. FINDINGS:       Somewhat pedunculated appearing region of soft tissue density extending   inferiorly from the rectum with small air-fluid level noted in mild adjacent   soft tissue stranding at the apex of the gluteal crease. No evidence of   extension into the adjacent ischial rectal fossa. Prostate and urinary bladder   as well as bilateral seminal vesicles appear within normal limits. Small and large bowel appear normal course and caliber. Scattered colonic   diverticula without diverticulitis. Normal appendix. Scattered aortobiiliac atherosclerotic vascular calcification without evidence   of aneurysmal dilatation. No adenopathy. No free pelvic fluid. Review of osseous windows demonstrate no acute abnormality or suspicious lytic   or blastic lesions. Mild lower lumbar spondylosis and facet joint osteoarthritis   L5-S1. CXR Results  (Last 48 hours)    None          Medications given in the ED-  Medications - No data to display      Medical Decision Making   I am the first provider for this patient. I reviewed the vital signs, available nursing notes, past medical history, past surgical history, family history and social history. Vital Signs-Reviewed the patient's vital signs. Records Reviewed: Nursing Notes    Procedures:  Procedures    ED Course:   2:05 PM   Initial assessment performed. The patients presenting problems have been discussed, and they are in agreement with the care plan formulated and outlined with them. I have encouraged them to ask questions as they arise throughout their visit. Discussion: 46 y.o. male return back 50 hours after I&D perirectal abscess. Patient afebrile with appropriate vital signs feeling better wound appears to be healing well. Will give a few more days of Bactrim as patient was only prescribed 7 days we will stretched out to 10 with PCP follow-up and strict return precautions discussed. Diagnosis and Disposition       DISCHARGE NOTE:  Mahsafilemon Avendaño's  results have been reviewed with him. He has been counseled regarding his diagnosis, treatment, and plan. He verbally conveys understanding and agreement of the signs, symptoms, diagnosis, treatment and prognosis and additionally agrees to follow up as discussed. He also agrees with the care-plan and conveys that all of his questions have been answered. I have also provided discharge instructions for him that include: educational information regarding their diagnosis and treatment, and list of reasons why they would want to return to the ED prior to their follow-up appointment, should his condition change. He has been provided with education for proper emergency department utilization. CLINICAL IMPRESSION:    1. Wound check, abscess        PLAN:  1. D/C Home  2. Current Discharge Medication List      START taking these medications    Details   trimethoprim-sulfamethoxazole (BACTRIM DS) 160-800 mg per tablet Take 1 Tab by mouth two (2) times a day for 3 days. Qty: 14 Tab, Refills: 0         CONTINUE these medications which have NOT CHANGED    Details   trimethoprim-sulfamethoxazole (BACTRIM, SEPTRA)  mg per tablet Take 1 Tab by mouth two (2) times a day for 7 days. Qty: 14 Tab, Refills: 0    Associated Diagnoses: Rectal pain           3.    Follow-up Information     Follow up With Specialties Details Why Contact Info    Beata Acevedo MD Family Practice Schedule an appointment as soon as possible for a visit  [address] [facility],[address] [facility] EMERGENCY DEPT Emergency Medicine  As needed, If symptoms worsen [address] [place] [id number] [phone]                  Please note that this dictation was completed with [employer], the computer voice recognition software. Quite often unanticipated grammatical, syntax, homophones, and other interpretive errors are inadvertently transcribed by the computer software. Please disregard these errors. Please excuse any errors that have escaped final proofreading.

## 2019-12-25 LAB
BACTERIA SPEC CULT: ABNORMAL
GRAM STN SPEC: ABNORMAL
GRAM STN SPEC: ABNORMAL
SERVICE CMNT-IMP: ABNORMAL

## 2019-12-25 NOTE — PROGRESS NOTES
Following incision and drainage, patient was placed on Bactrim to treat infection. Although not a medication in the susceptibility table, most likely will cover infection. No changes.

## 2020-02-27 ENCOUNTER — DOCUMENTATION ONLY (OUTPATIENT)
Dept: INTERNAL MEDICINE CLINIC | Age: 50
End: 2020-02-27

## 2020-02-27 NOTE — PROGRESS NOTES
Key: [id number] - PA Case ID: [id number] - Rx #: [id number] for Pantoprazole 40 mg DR tabs  Approved today 2/27/20  Questionnaire submitted. PA Case [id number] Status: Approved.  Authorization and Notifications Completed

## 2020-03-25 ENCOUNTER — TELEPHONE (OUTPATIENT)
Dept: CARDIOLOGY CLINIC | Age: 50
End: 2020-03-25

## 2020-03-25 RX ORDER — EVOLOCUMAB 140 MG/ML
INJECTION, SOLUTION SUBCUTANEOUS
Qty: 6 PEN | Refills: 4 | Status: SHIPPED | OUTPATIENT
Start: 2020-03-25 | End: 2021-07-13 | Stop reason: SDUPTHER

## 2020-03-26 ENCOUNTER — DOCUMENTATION ONLY (OUTPATIENT)
Dept: CARDIOLOGY CLINIC | Age: 50
End: 2020-03-26

## 2020-03-26 NOTE — PROGRESS NOTES
Faxed Rx to [facility] to [phone] for refill on Repatha. Script was sent thru computer also. Received fax confirmation.

## 2020-04-08 ENCOUNTER — DOCUMENTATION ONLY (OUTPATIENT)
Dept: CARDIOLOGY CLINIC | Age: 50
End: 2020-04-08

## 2020-04-08 NOTE — PROGRESS NOTES
Went on cover my meds and filled out info for [address]. It was sent / faxed to plan.

## 2020-08-26 ENCOUNTER — DOCUMENTATION ONLY (OUTPATIENT)
Dept: INTERNAL MEDICINE CLINIC | Age: 50
End: 2020-08-26

## 2020-08-26 NOTE — PROGRESS NOTES
8/26/20 PA for Ranolazine ER 1000 mg er tablets.  Outcome Approved today   PA Case: [id number], Status: Approved, Coverage Starts on: 8/26/2020 12:00:00 AM, Coverage Ends on: 8/26/2021 12:00:00 AM.

## 2020-09-01 ENCOUNTER — TELEPHONE (OUTPATIENT)
Dept: CARDIOLOGY CLINIC | Age: 50
End: 2020-09-01

## 2020-11-27 DIAGNOSIS — I69.354 HEMIPARESIS AFFECTING LEFT SIDE AS LATE EFFECT OF CEREBROVASCULAR ACCIDENT (CVA) (HCC): ICD-10-CM

## 2020-11-27 DIAGNOSIS — K21.9 GASTROESOPHAGEAL REFLUX DISEASE: ICD-10-CM

## 2020-11-27 DIAGNOSIS — I63.341 CEREBRAL INFARCTION DUE TO THROMBOSIS OF RIGHT CEREBELLAR ARTERY (HCC): ICD-10-CM

## 2020-11-27 DIAGNOSIS — I10 ESSENTIAL HYPERTENSION: ICD-10-CM

## 2020-11-27 RX ORDER — ISOSORBIDE MONONITRATE 60 MG/1
TABLET, EXTENDED RELEASE ORAL
Qty: 30 TAB | Refills: 5 | Status: SHIPPED | OUTPATIENT
Start: 2020-11-27 | End: 2021-03-12 | Stop reason: SDUPTHER

## 2020-11-27 RX ORDER — TOPIRAMATE 50 MG/1
TABLET, FILM COATED ORAL
Qty: 60 TAB | Refills: 5 | Status: SHIPPED | OUTPATIENT
Start: 2020-11-27 | End: 2021-03-12 | Stop reason: SDUPTHER

## 2020-11-27 RX ORDER — RANOLAZINE 1000 MG/1
TABLET, EXTENDED RELEASE ORAL
Qty: 60 TAB | Refills: 0 | Status: SHIPPED | OUTPATIENT
Start: 2020-11-27 | End: 2020-12-28

## 2020-11-27 RX ORDER — PANTOPRAZOLE SODIUM 40 MG/1
TABLET, DELAYED RELEASE ORAL
Qty: 30 TAB | Refills: 5 | Status: SHIPPED | OUTPATIENT
Start: 2020-11-27 | End: 2021-03-12 | Stop reason: SDUPTHER

## 2020-12-07 ENCOUNTER — OFFICE VISIT (OUTPATIENT)
Dept: CARDIOLOGY CLINIC | Age: 50
End: 2020-12-07
Payer: MEDICAID

## 2020-12-07 VITALS
WEIGHT: 269.4 LBS | HEART RATE: 72 BPM | BODY MASS INDEX: 38.57 KG/M2 | RESPIRATION RATE: 18 BRPM | DIASTOLIC BLOOD PRESSURE: 88 MMHG | HEIGHT: 70 IN | OXYGEN SATURATION: 98 % | SYSTOLIC BLOOD PRESSURE: 124 MMHG

## 2020-12-07 DIAGNOSIS — I25.10 CORONARY ARTERY DISEASE INVOLVING NATIVE CORONARY ARTERY OF NATIVE HEART WITHOUT ANGINA PECTORIS: Primary | ICD-10-CM

## 2020-12-07 DIAGNOSIS — Z82.49 FAMILY HISTORY OF EARLY CAD: ICD-10-CM

## 2020-12-07 DIAGNOSIS — Z98.61 S/P PTCA (PERCUTANEOUS TRANSLUMINAL CORONARY ANGIOPLASTY): ICD-10-CM

## 2020-12-07 DIAGNOSIS — I10 ESSENTIAL HYPERTENSION: ICD-10-CM

## 2020-12-07 DIAGNOSIS — E78.2 MIXED HYPERLIPIDEMIA: ICD-10-CM

## 2020-12-07 DIAGNOSIS — I25.10 CORONARY ARTERY DISEASE INVOLVING NATIVE CORONARY ARTERY OF NATIVE HEART WITHOUT ANGINA PECTORIS: ICD-10-CM

## 2020-12-07 PROCEDURE — 93000 ELECTROCARDIOGRAM COMPLETE: CPT | Performed by: INTERNAL MEDICINE

## 2020-12-07 PROCEDURE — 99214 OFFICE O/P EST MOD 30 MIN: CPT | Performed by: INTERNAL MEDICINE

## 2020-12-07 RX ORDER — CEPHALEXIN 500 MG/1
CAPSULE ORAL
COMMUNITY
Start: 2020-12-05 | End: 2021-03-12 | Stop reason: ALTCHOICE

## 2020-12-07 RX ORDER — GALCANEZUMAB 120 MG/ML
120 INJECTION, SOLUTION SUBCUTANEOUS
COMMUNITY
Start: 2020-11-16 | End: 2021-10-04 | Stop reason: ALTCHOICE

## 2020-12-07 NOTE — PROGRESS NOTES
12/7/2020 10:35 AM      Subjective:     Eufemia Sandy is seen in office today for f/u visit. Doing very well from CV standpoint. He denies chest pain, chest pressure/discomfort, dyspnea, palpitations, irregular heart beats, near-syncope, syncope, fatigue, orthopnea, paroxysmal nocturnal dyspnea, exertional chest pressure/discomfort, claudication, lower extremity edema, tachypnea. C/o right eye swelling and pain. Went to ER and was started on abx. Visit Vitals  /88 (BP 1 Location: Right arm, BP Patient Position: Sitting)   Pulse 72   Resp 18   Ht 5' 10\" (1.778 m)   Wt 269 lb 6.4 oz (122.2 kg)   SpO2 98%   BMI 38.65 kg/m²     Current Outpatient Medications   Medication Sig    Emgality Pen 120 mg/mL injection 120 mg by SubCUTAneous route every thirty (30) days.  cephALEXin (KEFLEX) 500 mg capsule TAKE 1 CAPSULE BY MOUTH 4 TIMES DAILY FOR 7 DAYS    pantoprazole (PROTONIX) 40 mg tablet Take 1 tablet by mouth once daily    topiramate (TOPAMAX) 50 mg tablet Take 1 tablet by mouth twice daily    isosorbide mononitrate ER (IMDUR) 60 mg CR tablet TAKE 1 TABLET BY MOUTH IN THE MORNING    ranolazine ER (RANEXA) 1,000 mg TAKE 1 TABLET  BY MOUTH TWICE DAILY. NEED APPT    clopidogreL (PLAVIX) 75 mg tab Take 1 tablet by mouth once daily    citalopram (CELEXA) 20 mg tablet TAKE 1 & 1/2 (ONE & ONE-HALF) TABLETS BY MOUTH ONCE DAILY    Myrbetriq 50 mg ER tablet Take 1 tablet by mouth once daily    evolocumab (Repatha SureClick) pen injection Inject 140 mg under the skin every 2 weeks    metoprolol tartrate (LOPRESSOR) 25 mg tablet TAKE ONE-HALF TABLET BY MOUTH TWICE DAILY (Patient taking differently: TAKE ONE-HALF TABLET BY MOUTH DAILY)    gabapentin (NEURONTIN) 300 mg capsule Take 300 mg by mouth two (2) times a day. 300 QAM & 600 QPM    aspirin delayed-release 81 mg tablet Take 1 Tab by mouth daily.     omega-3 fatty acids-vitamin e (FISH OIL) 1,000 mg cap Take 1 Cap by mouth daily.    fexofenadine (ALLEGRA) 180 mg tablet Take  by mouth daily.  hydrocortisone (ANUSOL-HC) 2.5 % rectal cream Insert  into rectum four (4) times daily. No current facility-administered medications for this visit.           Objective:      Visit Vitals  /88 (BP 1 Location: Right arm, BP Patient Position: Sitting)   Pulse 72   Resp 18   Ht 5' 10\" (1.778 m)   Wt 269 lb 6.4 oz (122.2 kg)   SpO2 98%   BMI 38.65 kg/m²       Data Review:     EKG: SR, LAD    Reviewed and/or ordered active problem list, medication list tests    Past Medical History:   Diagnosis Date    CAD (coronary artery disease)     GERD (gastroesophageal reflux disease)     Headache(784.0)     Hypertension     Stroke ([address]       Past Surgical History:   Procedure Laterality Date    CARDIAC SURG PROCEDURE UNLIST      HX CORONARY STENT PLACEMENT       Allergies   Allergen Reactions    Crestor [Rosuvastatin] Rash    Iodinated Contrast Media Hives    Statins-Hmg-Coa Reductase Inhibitors Myalgia      Family History   Problem Relation Age of Onset    Heart Disease Mother     Hypertension Mother     Diabetes Father     Heart Disease Father       Social History     Socioeconomic History    Marital status: SINGLE     Spouse name: Not on file    Number of children: 0    Years of education: Not on file    Highest education level: Not on file   Occupational History    Occupation: disabled     Employer: NOT EMPLOYED   Social Needs    Financial resource strain: Not on file    Food insecurity     Worry: Not on file     Inability: Not on file    Transportation needs     Medical: Not on file     Non-medical: Not on file   Tobacco Use    Smoking status: Former Smoker     Packs/day: 0.30     Years: 30.00     Pack years: 9.00     Types: Cigarettes     Last attempt to quit: 2011     Years since quittin.2    Smokeless tobacco: Never Used   Substance and Sexual Activity    Alcohol use: No     Alcohol/week: 0.0 standard drinks    Drug use: No    Sexual activity: Not Currently   Lifestyle    Physical activity     Days per week: Not on file     Minutes per session: Not on file    Stress: Not on file   Relationships    Social connections     Talks on phone: Not on file     Gets together: Not on file     Attends Confucianist service: Not on file     Active member of club or organization: Not on file     Attends meetings of clubs or organizations: Not on file     Relationship status: Not on file    Intimate partner violence     Fear of current or ex partner: Not on file     Emotionally abused: Not on file     Physically abused: Not on file     Forced sexual activity: Not on file   Other Topics Concern    Not on file   Social History Narrative    Lives on his own friends and relatives look in on him. Review of Systems     General: Not Present- Anorexia, Chills, Dietary Changes, Fatigue, Fever, Medication Changes, Night Sweats, Weight Gain > 10lbs. and Weight Loss > 10lbs. .  Skin: Not Present- Bruising and Excessive Sweating. HEENT: Not Present- Headache, Visual Loss and Vertigo. Respiratory: Not Present- Cough, Decreased Exercise Tolerance, Difficulty Breathing, Snoring and Wheezing. Cardiovascular: Not Present- Abnormal Blood Pressure, Chest Pain, Claudications, Difficulty Breathing On Exertion, Edema, Fainting / Blacking Out, Irregular Heart Beat, Night Cramps, Orthopnea, Palpitations, Paroxysmal Nocturnal Dyspnea, Rapid Heart Rate, Shortness of Breath and Swelling of Extremities. Gastrointestinal: Not Present- Black, Tarry Stool, Bloody Stool, Diarrhea, Hematemesis, Rectal Bleeding and Vomiting. Musculoskeletal: Not Present- Muscle Pain and Muscle Weakness. Psychiatric: Not Present- Depression. Endocrine: Not Present- Cold Intolerance, Heat Intolerance and Thyroid Problems. Hematology: Not Present- Abnormal Bleeding, Anemia, Blood Clots and Easy Bruising.        Physical Exam   The physical exam findings are as follows:       General   Mental Status - Alert. General Appearance - Not in acute distress. Chest and Lung Exam   Inspection: Accessory muscles - No use of accessory muscles in breathing. Auscultation:   Breath sounds: - Normal.      Cardiovascular   Inspection: Jugular vein - Bilateral - Inspection Normal.  Palpation/Percussion:   Apical Impulse: - Normal.  Auscultation: Rhythm - Regular. Heart Sounds - S1 WNL and S2 WNL. No S3 or S4. Murmurs & Other Heart Sounds: Auscultation of the heart reveals - No Murmurs. Carotid arteries - No Carotid bruit. Peripheral Vascular   Upper Extremity: Inspection - Bilateral - No Cyanotic nailbeds or Digital clubbing. Lower Extremity:   Palpation: Edema - Bilateral - No edema. Assessment:       ICD-10-CM ICD-9-CM    1. Coronary artery disease involving native coronary artery of native heart without angina pectoris  I25.10 414.01 AMB POC EKG ROUTINE W/ 12 LEADS, INTER & REP      LIPID PANEL      METABOLIC PANEL, COMPREHENSIVE   2. Essential hypertension  I10 401.9 AMB POC EKG ROUTINE W/ 12 LEADS, INTER & REP      LIPID PANEL      METABOLIC PANEL, COMPREHENSIVE   3. Family history of early CAD  Z82.49 V17.3 AMB POC EKG ROUTINE W/ 12 LEADS, INTER & REP      LIPID PANEL      METABOLIC PANEL, COMPREHENSIVE   4. Mixed hyperlipidemia  E78.2 272.2 AMB POC EKG ROUTINE W/ 12 LEADS, INTER & REP      LIPID PANEL      METABOLIC PANEL, COMPREHENSIVE   5. S/P PTCA (percutaneous transluminal coronary angioplasty)  Z98.61 V45.82 LIPID PANEL      METABOLIC PANEL, COMPREHENSIVE       Plan:     1. CAD: s/p PTCA stenting of the LAD 2012. Has done well with medical treatment. Normal stress test and Echo after last year visit. 2.  Hyperlipidemia: On Repatha therapy. Check labs. 3.  Dizziness with positional change: chronic. Unchanged. Has been present since initial CVA  4.  Hypertension: controlled.    5. Eye swelling: needs to see ophthalmologist. Will have him f/u with [facility].

## 2020-12-07 NOTE — PROGRESS NOTES
1. Have you been to the ER, urgent care clinic since your last visit? Hospitalized since your last visit? Seen at [address],Basim B for his eye for insect bite. 2. Have you seen or consulted any other health care providers outside of the [address] [place] [address] since your last visit? Include any pap smears or colon screening.    No.      Chief Complaint   Patient presents with    Follow-up     6 month- pt denies any cardiac symptoms

## 2020-12-08 ENCOUNTER — TELEPHONE (OUTPATIENT)
Dept: CARDIOLOGY CLINIC | Age: 50
End: 2020-12-08

## 2020-12-08 LAB
ALBUMIN SERPL-MCNC: 4.6 G/DL (ref 3.8–4.9)
ALBUMIN/GLOB SERPL: 1.8 {RATIO} (ref 1.2–2.2)
ALP SERPL-CCNC: 59 IU/L (ref 39–117)
ALT SERPL-CCNC: 29 IU/L (ref 0–44)
AST SERPL-CCNC: 25 IU/L (ref 0–40)
BILIRUB SERPL-MCNC: 0.6 MG/DL (ref 0–1.2)
BUN SERPL-MCNC: 11 MG/DL (ref 6–24)
BUN/CREAT SERPL: 10 (ref 9–20)
CALCIUM SERPL-MCNC: 9.2 MG/DL (ref 8.7–10.2)
CHLORIDE SERPL-SCNC: 103 MMOL/L (ref 96–106)
CHOLEST SERPL-MCNC: 159 MG/DL (ref 100–199)
CO2 SERPL-SCNC: 21 MMOL/L (ref 20–29)
CREAT SERPL-MCNC: 1.09 MG/DL (ref 0.76–1.27)
GLOBULIN SER CALC-MCNC: 2.5 G/DL (ref 1.5–4.5)
GLUCOSE SERPL-MCNC: 98 MG/DL (ref 65–99)
HDLC SERPL-MCNC: 35 MG/DL
INTERPRETATION, 910389: NORMAL
LDLC SERPL CALC-MCNC: 86 MG/DL (ref 0–99)
POTASSIUM SERPL-SCNC: 4 MMOL/L (ref 3.5–5.2)
PROT SERPL-MCNC: 7.1 G/DL (ref 6–8.5)
SODIUM SERPL-SCNC: 139 MMOL/L (ref 134–144)
TRIGL SERPL-MCNC: 225 MG/DL (ref 0–149)
VLDLC SERPL CALC-MCNC: 38 MG/DL (ref 5–40)

## 2020-12-08 NOTE — TELEPHONE ENCOUNTER
----- Message from Nic Hernandez MD sent at 12/8/2020  9:42 AM EST -----  Inform him labs are kSiobhan Melvin Second , wife on PHI, left message to call me back tomorrow regarding lab results.

## 2020-12-09 NOTE — TELEPHONE ENCOUNTER
Returned call and left message to call me back. Advised if she does not get me then let the PSR know I can leave a voice message on her phone so we do not keep playing phone tag.

## 2020-12-10 NOTE — TELEPHONE ENCOUNTER
Returned call to wife Kate Das on [address] his labs look okay per . read out values to her. She verbalized understanding.

## 2021-01-04 ENCOUNTER — TELEPHONE (OUTPATIENT)
Dept: INTERNAL MEDICINE CLINIC | Age: 51
End: 2021-01-04

## 2021-03-12 ENCOUNTER — OFFICE VISIT (OUTPATIENT)
Dept: INTERNAL MEDICINE CLINIC | Age: 51
End: 2021-03-12
Payer: MEDICAID

## 2021-03-12 ENCOUNTER — TELEPHONE (OUTPATIENT)
Dept: CARDIOLOGY CLINIC | Age: 51
End: 2021-03-12

## 2021-03-12 VITALS
SYSTOLIC BLOOD PRESSURE: 104 MMHG | HEART RATE: 68 BPM | RESPIRATION RATE: 16 BRPM | HEIGHT: 70 IN | DIASTOLIC BLOOD PRESSURE: 65 MMHG | BODY MASS INDEX: 39.37 KG/M2 | WEIGHT: 275 LBS | TEMPERATURE: 97.7 F | OXYGEN SATURATION: 96 %

## 2021-03-12 DIAGNOSIS — F32.0 CURRENT MILD EPISODE OF MAJOR DEPRESSIVE DISORDER, UNSPECIFIED WHETHER RECURRENT (HCC): ICD-10-CM

## 2021-03-12 DIAGNOSIS — I63.341 CEREBRAL INFARCTION DUE TO THROMBOSIS OF RIGHT CEREBELLAR ARTERY (HCC): ICD-10-CM

## 2021-03-12 DIAGNOSIS — G44.019 EPISODIC CLUSTER HEADACHE, NOT INTRACTABLE: ICD-10-CM

## 2021-03-12 DIAGNOSIS — K21.9 GASTROESOPHAGEAL REFLUX DISEASE: ICD-10-CM

## 2021-03-12 DIAGNOSIS — I10 ESSENTIAL HYPERTENSION: ICD-10-CM

## 2021-03-12 DIAGNOSIS — I69.354 HEMIPARESIS AFFECTING LEFT SIDE AS LATE EFFECT OF CEREBROVASCULAR ACCIDENT (CVA) (HCC): ICD-10-CM

## 2021-03-12 DIAGNOSIS — M54.31 SCIATICA OF RIGHT SIDE: Primary | ICD-10-CM

## 2021-03-12 PROCEDURE — 99214 OFFICE O/P EST MOD 30 MIN: CPT | Performed by: FAMILY MEDICINE

## 2021-03-12 RX ORDER — RANOLAZINE 1000 MG/1
1000 TABLET, EXTENDED RELEASE ORAL 2 TIMES DAILY
Qty: 180 TAB | Refills: 1 | Status: SHIPPED | OUTPATIENT
Start: 2021-03-12 | End: 2021-10-04 | Stop reason: SDUPTHER

## 2021-03-12 RX ORDER — TOPIRAMATE 50 MG/1
50 TABLET, FILM COATED ORAL 2 TIMES DAILY
Qty: 180 TAB | Refills: 1 | Status: SHIPPED | OUTPATIENT
Start: 2021-03-12 | End: 2021-10-04 | Stop reason: SDUPTHER

## 2021-03-12 RX ORDER — METOPROLOL TARTRATE 25 MG/1
TABLET, FILM COATED ORAL
Qty: 90 TAB | Refills: 1 | Status: SHIPPED | OUTPATIENT
Start: 2021-03-12 | End: 2021-10-04 | Stop reason: SDUPTHER

## 2021-03-12 RX ORDER — ISOSORBIDE MONONITRATE 60 MG/1
TABLET, EXTENDED RELEASE ORAL
Qty: 90 TAB | Refills: 1 | Status: SHIPPED | OUTPATIENT
Start: 2021-03-12 | End: 2021-10-04 | Stop reason: SDUPTHER

## 2021-03-12 RX ORDER — PANTOPRAZOLE SODIUM 40 MG/1
40 TABLET, DELAYED RELEASE ORAL DAILY
Qty: 90 TAB | Refills: 1 | Status: SHIPPED | OUTPATIENT
Start: 2021-03-12 | End: 2021-09-26

## 2021-03-12 RX ORDER — CITALOPRAM 20 MG/1
30 TABLET, FILM COATED ORAL DAILY
Qty: 135 TAB | Refills: 1 | Status: SHIPPED | OUTPATIENT
Start: 2021-03-12 | End: 2021-10-04 | Stop reason: SDUPTHER

## 2021-03-12 RX ORDER — CLOPIDOGREL BISULFATE 75 MG/1
75 TABLET ORAL DAILY
Qty: 90 TAB | Refills: 1 | Status: SHIPPED | OUTPATIENT
Start: 2021-03-12 | End: 2021-10-04 | Stop reason: SDUPTHER

## 2021-03-12 NOTE — TELEPHONE ENCOUNTER
Called Manjinder Torres to start Repatha prior auth. Went thru the prompts twice and they hung up on me both times. Went on cover my meds to chat with someone regarding the prior auth . The lady could not figure what is going on. I advised her I would call the pt.

## 2021-03-12 NOTE — PROGRESS NOTES
HISTORY OF PRESENT ILLNESS  Brian Cortes is a 44 y.o. male. LOW BACK PAIN  The history is provided by the patient and caregiver. This is a chronic problem. Episode onset: months. The problem occurs hourly. The problem has been gradually worsening. Associated symptoms include headaches. Pertinent negatives include no chest pain and no shortness of breath. The symptoms are aggravated by walking and exertion. Treatments tried: neurologist ordered EMG gabapentin. Review of Systems   Constitutional: Negative for fever and weight loss. Respiratory: Negative for shortness of breath. Cardiovascular: Negative for chest pain. Gastrointestinal:        No incontinance   Musculoskeletal: Positive for falls. 6 mo ago   Neurological: Positive for focal weakness and headaches. 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 9) with comorbidity ([facility]) E66.01     Allergies   Allergen Reactions    Crestor [Rosuvastatin] Rash    Iodinated Contrast Media Hives    Statins-Hmg-Coa Reductase Inhibitors Myalgia     Social History     Socioeconomic History    Marital status: SINGLE     Spouse name: Not on file    Number of children: 0    Years of education: Not on file    Highest education level: Not on file   Occupational History    Occupation: disabled     Employer: NOT EMPLOYED   Social Needs    Financial resource strain: Not on file    Food insecurity     Worry: Not on file     Inability: Not on file    Transportation needs     Medical: Not on file     Non-medical: Not on file   Tobacco Use    Smoking status: Former Smoker     Packs/day: 0.30     Years: 30.00     Pack years: 9.00     Types: Cigarettes     Quit date: 2011     Years since quittin.4    Smokeless tobacco: Never Used   Substance and Sexual Activity    Alcohol use: No     Alcohol/week: 0.0 standard drinks    Drug use: No    Sexual activity: Not Currently   Lifestyle    Physical activity     Days per week: Not on file     Minutes per session: Not on file    Stress: Not on file   Relationships    Social connections     Talks on phone: Not on file     Gets together: Not on file     Attends [employer] service: Not on file     Active member of club or organization: Not on file     Attends meetings of clubs or organizations: Not on file     Relationship status: Not on file    Intimate partner violence     Fear of current or ex partner: Not on file     Emotionally abused: Not on file     Physically abused: Not on file     Forced sexual activity: Not on file   Other Topics Concern    Not on file   Social History Narrative    Lives on his own friends and relatives look in on him. Physical Exam  Visit Vitals  /65 (BP 1 Location: Right arm, BP Patient Position: Sitting, BP Cuff Size: Large adult)   Pulse 68   Temp 97.7 °F (36.5 °C) (Temporal)   Resp 16   Ht 5' 10\" (1.778 m)   Wt 275 lb (124.7 kg)   SpO2 96%   BMI 39.46 kg/m²     WD WN male NAD  Heart RRR without murmers clicks or rubs  Lungs CTA  Abdo soft nontender  Ext no edema  Left arm in sling  ASSESSMENT and PLAN      ICD-10-CM ICD-9-CM    1. Sciatica of right side  M54.31 724.3 XR SPINE LUMB 2 OR 3 V   2. Episodic cluster headache, not intractable  G44.019 339.01    3. Essential hypertension  I10 401.9 metoprolol tartrate (LOPRESSOR) 25 mg tablet      isosorbide mononitrate ER (IMDUR) 60 mg CR tablet   4. Hemiparesis affecting left side as late effect of cerebrovascular accident (CVA) ([facility])  I69.354 438.20 topiramate (TOPAMAX) 50 mg tablet   5. 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 Current mild episode of major depressive disorder, unspecified whether recurrent ([facility])  F32.0 296.21 citalopram (CELEXA) 20 mg tablet   7. Gastroesophageal reflux disease  K21.9 530.81 pantoprazole (PROTONIX) 40 mg tablet       Orders Placed This Encounter    XR SPINE LUMB 2 OR 3 V     Cont to see neurology  ABove issues stable.     fupo 3 mo

## 2021-03-12 NOTE — PROGRESS NOTES
Chief Complaint   Patient presents with    LOW BACK PAIN     pt fell 6 months ago on his back     Health Maintenance reviewed. I have reviewed the patient's medical history in detail and updated the computerized patient record. Patient has not been out of the country in (14 months), NO diarrhea, NO cough, NO chest conjestion, NO temp. Pt has not been around anyone with these symptoms. 1. Have you been to the ER, urgent care clinic since your last visit? Hospitalized since your last visit?no    2. Have you seen or consulted any other health care providers outside of the [address] [place] [address] since your last visit? Include any pap smears or colon screening. No      Encouraged pt to discuss pt's wishes with spouse/partner/family and bring them in the next appt to follow thru with the Advanced Directive    Fall Risk Assessment, last 12 mths 3/12/2021   Able to walk? Yes   Fall in past 12 months? 1   Do you feel unsteady? 1   Are you worried about falling 1   Is the gait abnormal? 1   Number of falls in past 12 months 2   Fall with injury?  1       3 most recent PHQ Screens 3/12/2021   Little interest or pleasure in doing things More than half the days   Feeling down, depressed, irritable, or hopeless More than half the days   Total Score PHQ 2 4   Trouble falling or staying asleep, or sleeping too much -   Feeling tired or having little energy -   Poor appetite, weight loss, or overeating -   Feeling bad about yourself - or that you are a failure or have let yourself or your family down -   Trouble concentrating on things such as school, work, reading, or watching TV -   Moving or speaking so slowly that other people could have noticed; or the opposite being so fidgety that others notice -   Thoughts of being better off dead, or hurting yourself in some way -   How difficult have these problems made it for you to do your work, take care of your home and get along with others -       Abuse Screening Questionnaire 3/12/2021   Do you ever feel afraid of your partner? N   Are you in a relationship with someone who physically or mentally threatens you? N   Is it safe for you to go home?  Y       ADL Assessment 3/12/2021   Feeding yourself No Help Needed   Getting from bed to chair Help Needed   Getting dressed Help Needed   Bathing or showering Help Needed   Walk across the room (includes cane/walker) Help Needed   Using the telphone Help Needed   Taking your medications Help Needed   Preparing meals Help Needed   Managing money (expenses/bills) Help Needed   Moderately strenuous housework (laundry) Help Needed   Shopping for personal items (toiletries/medicines) Help Needed   Shopping for groceries Help Needed   Driving Help Needed   Climbing a flight of stairs Help Needed   Getting to places beyond walking distances Help Needed

## 2021-03-12 NOTE — TELEPHONE ENCOUNTER
[facility] on [facility], advised that I am trying to get Repatha approved and I am wondering if his insurance has been switched or something else is going on. Advised to call me back.

## 2021-03-15 ENCOUNTER — TELEPHONE (OUTPATIENT)
Dept: CARDIOLOGY CLINIC | Age: 51
End: 2021-03-15

## 2021-03-15 ENCOUNTER — TELEPHONE (OUTPATIENT)
Dept: INTERNAL MEDICINE CLINIC | Age: 51
End: 2021-03-15

## 2021-03-15 NOTE — TELEPHONE ENCOUNTER
Tonny Velazquez is calling asking if MRI results have came yet?      [phone]      She said you can leave a detailed message if doesn't answer

## 2021-03-15 NOTE — TELEPHONE ENCOUNTER
Carito Brown 33 minutes ago (2:39 PM)        Helena Ureña, returned your call from Friday,  Please call.      Thanks            Documentation

## 2021-03-16 ENCOUNTER — TELEPHONE (OUTPATIENT)
Dept: INTERNAL MEDICINE CLINIC | Age: 51
End: 2021-03-16

## 2021-03-16 NOTE — TELEPHONE ENCOUNTER
3/16/21 Plan called @ [phone] PA for Pantoprazole Sodium 40 mg dr tablets,  completed with LESLEE LR Approval was given 3/16/21 thru 3/16/22. Fernanda Gutiérrez called spoke with Theodora Moy state it went thru,I will schedule it for fill date on 3/20/21.

## 2021-03-18 NOTE — TELEPHONE ENCOUNTER
[facility] and left a message regarding no MRI results found - but lumbar Xray results showed arthritis per Dr Lynch Risk note - requested a return call to the office with any further questions or concerns  Gretchen Meek LPN  8/51/1403  1:78 AM

## 2021-03-23 NOTE — TELEPHONE ENCOUNTER
Patient returning your call      Regarding insurance      Medicaid      [phone] [facility]    Thanks  Alma Lewis

## 2021-03-23 NOTE — TELEPHONE ENCOUNTER
Returned Nathen Rolling on [place], advised I had received the prior auth on Repatha bu unable to do. I have called the insurance number and they transfer but hang up on me, that has been numerous times. She advised his insurance has not changed, nothing has changed for the pt. She advised she would call the insurance company herself and see what is going on and call me back and let me know. Fatimah verbalized understanding.

## 2021-04-21 NOTE — TELEPHONE ENCOUNTER
1/4/21 [employer] medicaid called for PA on Mybetriq 50 mg er tablet. Comlpeted with PA rep Birdie PICKERING,approval was given. Effective ; 1/4/21 thru 1/4/22. Case ID: [id number]. Approval called to [place]. [facility] state I will get it ready for him,0 out of pocket cost. Yes

## 2021-06-22 ENCOUNTER — TELEPHONE (OUTPATIENT)
Dept: CARDIOLOGY CLINIC | Age: 51
End: 2021-06-22

## 2021-07-12 DIAGNOSIS — M54.31 SCIATICA OF RIGHT SIDE: ICD-10-CM

## 2021-07-13 ENCOUNTER — TELEPHONE (OUTPATIENT)
Dept: CARDIOLOGY CLINIC | Age: 51
End: 2021-07-13

## 2021-07-13 RX ORDER — EVOLOCUMAB 140 MG/ML
INJECTION, SOLUTION SUBCUTANEOUS
Qty: 6 PEN | Refills: 4 | Status: SHIPPED | OUTPATIENT
Start: 2021-07-13 | End: 2021-10-04 | Stop reason: SDUPTHER

## 2021-07-13 NOTE — TELEPHONE ENCOUNTER
Pt wife [facility] called and said her  needs a refill for Repatha. She has tried to talk to the insurance she said that if they will accept it you can send it to the [address]. Please callback obinna at [phone].     Thank you,  Dheeraj Holloway

## 2021-07-16 ENCOUNTER — DOCUMENTATION ONLY (OUTPATIENT)
Dept: CARDIOLOGY CLINIC | Age: 51
End: 2021-07-16

## 2021-07-16 ENCOUNTER — TELEPHONE (OUTPATIENT)
Dept: CARDIOLOGY CLINIC | Age: 51
End: 2021-07-16

## 2021-07-16 DIAGNOSIS — I63.341 CEREBRAL INFARCTION DUE TO THROMBOSIS OF RIGHT CEREBELLAR ARTERY (HCC): ICD-10-CM

## 2021-07-16 DIAGNOSIS — I25.10 CORONARY ARTERY DISEASE INVOLVING NATIVE CORONARY ARTERY OF NATIVE HEART WITHOUT ANGINA PECTORIS: ICD-10-CM

## 2021-07-16 DIAGNOSIS — I10 ESSENTIAL HYPERTENSION: Primary | ICD-10-CM

## 2021-07-16 DIAGNOSIS — Z82.49 FAMILY HISTORY OF EARLY CAD: ICD-10-CM

## 2021-07-16 DIAGNOSIS — E78.2 MIXED HYPERLIPIDEMIA: ICD-10-CM

## 2021-07-16 NOTE — TELEPHONE ENCOUNTER
[facility] called regarding medication repatha sureclick  , you can send it to local  pharmacy is [facility] in [place] [address] # [phone]. Please call her back at [phone],    Thank Michelle

## 2021-07-16 NOTE — TELEPHONE ENCOUNTER
The refill was sent on 7/13/21 but I am still working on [facility] for [address] [place] [address]. The appeal was faxed in  Today.

## 2021-07-16 NOTE — TELEPHONE ENCOUNTER
Requested Prescriptions     Pending Prescriptions Disp Refills    Ranolazine (RANEXA) 1,000 mg Tb12 60 Tab 11     Sig: Take 1 Tab by mouth two (2) times a day. DATE OF SERVICE: 07-16-21 @ 07:02    Subjective: Patient seen and examined. No new events except as noted.     SUBJECTIVE/ROS:  s/p CABG  in CTICU  sitting in chair  NAD      MEDICATIONS:  MEDICATIONS  (STANDING):  albuterol/ipratropium for Nebulization 3 milliLiter(s) Nebulizer every 8 hours  aspirin enteric coated 81 milliGRAM(s) Oral daily  atorvastatin 40 milliGRAM(s) Oral at bedtime  buDESOnide    Inhalation Suspension 0.5 milliGRAM(s) Inhalation every 12 hours  cefuroxime  IVPB 1500 milliGRAM(s) IV Intermittent every 8 hours  chlorhexidine 2% Cloths 1 Application(s) Topical <User Schedule>  enoxaparin Injectable 40 milliGRAM(s) SubCutaneous daily  insulin regular Infusion 3 Unit(s)/Hr (3 mL/Hr) IV Continuous <Continuous>  pantoprazole    Tablet 40 milliGRAM(s) Oral before breakfast  polyethylene glycol 3350 17 Gram(s) Oral daily  sodium chloride 0.9%. 1000 milliLiter(s) (10 mL/Hr) IV Continuous <Continuous>      PHYSICAL EXAM:  T(C): 35.9 (07-16-21 @ 04:00), Max: 36.5 (07-16-21 @ 00:00)  HR: 84 (07-16-21 @ 07:00) (59 - 96)  BP: 120/62 (07-16-21 @ 07:00) (120/62 - 138/66)  RR: 11 (07-16-21 @ 07:00) (0 - 37)  SpO2: 95% (07-16-21 @ 07:00) (88% - 100%)  Wt(kg): --  I&O's Summary    15 Jul 2021 07:01  -  16 Jul 2021 07:00  --------------------------------------------------------  IN: 2218.3 mL / OUT: 1505 mL / NET: 713.3 mL            JVP: Normal  Neck: supple  Lung: clear   CV: S1 S2 , Murmur:  Abd: soft  Ext: No edema  neuro: Awake / alert  Psych: flat affect  Skin: normal``    LABS/DATA:    CARDIAC MARKERS:  CARDIAC MARKERS ( 15 Jul 2021 13:14 )  x     / x     / 101 U/L / x     / 8.1 ng/mL                                7.2    13.34 )-----------( 113      ( 16 Jul 2021 00:22 )             21.7     07-16    139  |  106  |  16  ----------------------------<  92  4.1   |  20<L>  |  1.10    Ca    9.2      16 Jul 2021 00:22  Phos  2.7     07-16  Mg     2.7     07-16    TPro  5.3<L>  /  Alb  4.1  /  TBili  2.0<H>  /  DBili  x   /  AST  20  /  ALT  14  /  AlkPhos  36<L>  07-16    proBNP:   Lipid Profile:   HgA1c:   TSH:     TELE:  EKG:

## 2021-07-16 NOTE — PROGRESS NOTES
Another nurse in the office started on Prior auth on cover my meds for Repatha. It was denied. Faxed in denial note, with updated labs and a note stating to see updated labs for pt, LDL 86. He has been on Repatha for 5 years with great results. Faxed to [phone] and received fax confirmation.

## 2021-07-19 ENCOUNTER — TELEPHONE (OUTPATIENT)
Dept: CARDIOLOGY CLINIC | Age: 51
End: 2021-07-19

## 2021-07-19 NOTE — TELEPHONE ENCOUNTER
Pt's wife Varsha Conte on [place] called in to advise of denial letter from Melissa on [employer] . Adrienne Clay I had received denial also and faxed in pt's most recent labs/note on 7/16/21 and I have not heard from them yet. Advised a message was sent to my manager regarding the Repatha and the fax number it was sent to. She advised pt does have healthkeepers / [employer]. He will need his next shot in 2 weeks. Advised her if she hears anything from insurance to call me and if I need anything else I would call her. She verbalized understanding.

## 2021-07-22 NOTE — TELEPHONE ENCOUNTER
Called insurance and spoke to William Miles and she advised to fax in all medical necessary info. I advised pt has been on this med for 5yrs. She then advised to call [phone] option 3 for live prior auth over the phone. Called insurance and spoke to Lacey [address] was sending me to the pharmacy for the prior auth but while on hold I got disconnected. Will call back on Monday.

## 2021-07-26 ENCOUNTER — TELEPHONE (OUTPATIENT)
Dept: CARDIOLOGY CLINIC | Age: 51
End: 2021-07-26

## 2021-07-26 NOTE — TELEPHONE ENCOUNTER
Diana Flores 12 minutes ago (3:04 PM)   [facility] C[facility] called and said they received a request from Dr JIMENEZ for an appeal on medication Repatha sureclick. They need a written consent from the pt saying he is allowing this. Please call them back at [phone]. And they need this by July 29th.     Thank you,  Cass Garcia , wife on PHI, advised I had received message from [facility] stating they need a letter of consent from pt stating he is ok with the appeal on [address] , [phone]. Advised to call me if needed.

## 2021-07-26 NOTE — TELEPHONE ENCOUNTER
[facility] called and said they received a request from Dr JIMENEZ for an appeal on medication Repatha sureclick. They need a written consent from the pt saying he is allowing this. Please call them back at [phone]. And they need this by July 29th.     Thank you,  William Ramey

## 2021-07-26 NOTE — TELEPHONE ENCOUNTER
Fatimah on [facility], called in and advised she received message. She has not had to do this before for this med. Advised I know I have not heard of this either but that is the message I received for him to call and give consent for appeal to go thru with the prior auth is what I am assuming. Jesenia Angel advised she would call tomorrow when she is with pt. She will call me tomorrow and leave detail message on voice mail if they put her thru. Fatimah verbalized understanding.

## 2021-07-27 NOTE — TELEPHONE ENCOUNTER
Amy Kimmy wife on PHI, she advised he spoke to [employer] today and they are talking about a possible hearing for pt. They stated you can only have 1 appeal and then it has to go to a hearing. She then spoke to Nuha Yates at [place] and she advised they need more recent lab work on pt. His last labs 12/20. Her number [phone]. I advised Amy Chetandelaney I would put order in cc for lab neri and they can go w/o lab slip and get labs done. Advised once I see resulted in cc I will fax to the appeal place. Fatimah verbalized understanding. Put lab order in cc.

## 2021-07-28 ENCOUNTER — TELEPHONE (OUTPATIENT)
Dept: INTERNAL MEDICINE CLINIC | Age: 51
End: 2021-07-28

## 2021-07-28 NOTE — TELEPHONE ENCOUNTER
7/28/21 Plan called @ [phone] Ranolazine 1000 mg er tablets. PA initiated with LESLEE hernández Esteban Hernandez # [id number] Claim sent for clinical review. Turn around time is within 24 hours. Please follow up as needed when fax  Is received.

## 2021-07-29 LAB
ALBUMIN SERPL-MCNC: 4.3 G/DL (ref 3.8–4.9)
ALBUMIN/GLOB SERPL: 1.8 {RATIO} (ref 1.2–2.2)
ALP SERPL-CCNC: 53 IU/L (ref 48–121)
ALT SERPL-CCNC: 19 IU/L (ref 0–44)
AST SERPL-CCNC: 20 IU/L (ref 0–40)
BILIRUB SERPL-MCNC: 0.4 MG/DL (ref 0–1.2)
BUN SERPL-MCNC: 11 MG/DL (ref 6–24)
BUN/CREAT SERPL: 10 (ref 9–20)
CALCIUM SERPL-MCNC: 9.2 MG/DL (ref 8.7–10.2)
CHLORIDE SERPL-SCNC: 106 MMOL/L (ref 96–106)
CHOLEST SERPL-MCNC: 190 MG/DL (ref 100–199)
CO2 SERPL-SCNC: 21 MMOL/L (ref 20–29)
CREAT SERPL-MCNC: 1.15 MG/DL (ref 0.76–1.27)
GLOBULIN SER CALC-MCNC: 2.4 G/DL (ref 1.5–4.5)
GLUCOSE SERPL-MCNC: 101 MG/DL (ref 65–99)
HDLC SERPL-MCNC: 36 MG/DL
IMP & REVIEW OF LAB RESULTS: NORMAL
LDLC SERPL CALC-MCNC: 109 MG/DL (ref 0–99)
POTASSIUM SERPL-SCNC: 4.8 MMOL/L (ref 3.5–5.2)
PROT SERPL-MCNC: 6.7 G/DL (ref 6–8.5)
SODIUM SERPL-SCNC: 141 MMOL/L (ref 134–144)
TRIGL SERPL-MCNC: 263 MG/DL (ref 0–149)
VLDLC SERPL CALC-MCNC: 45 MG/DL (ref 5–40)

## 2021-07-29 NOTE — TELEPHONE ENCOUNTER
Faxed updated labs to [facility] at [phone] stating updated labs on pt. Pt needs Repatha to continue to lower LDL-has been on repatha for over 5 yrs w/great response. Case ID # [id number] and received fax confirmation.

## 2021-07-30 ENCOUNTER — TELEPHONE (OUTPATIENT)
Dept: CARDIOLOGY CLINIC | Age: 51
End: 2021-07-30

## 2021-07-30 NOTE — TELEPHONE ENCOUNTER
Received fax from Brianda Underwood stating to mail in the appeal for Curt Rehman. Mailed in the appeal with updated labs to Jina Ramos [address], [address]. Put Urgent, Review.

## 2021-08-09 NOTE — TELEPHONE ENCOUNTER
Called [facility] and spoke to Nancy, she advised the denial from 7/15/21. She read out the report-which I have. She advised she does see the appeal sent in on 7/15/21 and they have 30 days to review. So they technically have until 8/15/21 for review. She advised they will fax something to us. [place] on PHI, left message that I had called repatha/insurance and I really do not have much info but she could call me tomorrow afternoon and we can touch base.

## 2021-08-17 ENCOUNTER — TELEPHONE (OUTPATIENT)
Dept: CARDIOLOGY CLINIC | Age: 51
End: 2021-08-17

## 2021-08-17 NOTE — TELEPHONE ENCOUNTER
Noted, see other encounter for Repatha prior auth. Left 2 messages for \[facility]\"" to call me back.

## 2021-08-17 NOTE — TELEPHONE ENCOUNTER
Pastor Reynoso 30 minutes ago (3:53 PM)   Mert Baez returned your call- she will be at work until 6- call her at [phone].     Thanks  rosario Le

## 2021-08-17 NOTE — TELEPHONE ENCOUNTER
Cecy Grullon 3 hours ago (11:27 AM)   AW     Patient got the Repatha 3 month supply finally.   There are refills on it.     Thanks,  fabiana Le

## 2021-08-17 NOTE — TELEPHONE ENCOUNTER
Returned The Kel[place], wife on PHI, she advised she called pharmacy on Friday 13th and the Repatha went thru and she picked up for a 3 month supply. Advised I had not received any notice of it being approved. [facility] has not received a letter either. She advised usually she will receive letter in mail and will bring with her to his next appt on 10/11/21 if she receives one. Went over pt's recent labs, LDL at 109 and she advised pt had not had Repatha in 1 month at that point. Advised they can discuss with  at his next visit. We will probably redo labs before his next prior auth on Repatha. [facility] verbalized understanding and thanked me for my help.

## 2021-08-23 ENCOUNTER — DOCUMENTATION ONLY (OUTPATIENT)
Dept: CARDIOLOGY CLINIC | Age: 51
End: 2021-08-23

## 2021-09-25 DIAGNOSIS — K21.9 GASTROESOPHAGEAL REFLUX DISEASE: ICD-10-CM

## 2021-09-26 RX ORDER — PANTOPRAZOLE SODIUM 40 MG/1
TABLET, DELAYED RELEASE ORAL
Qty: 90 TABLET | Refills: 0 | Status: SHIPPED | OUTPATIENT
Start: 2021-09-26 | End: 2021-10-04 | Stop reason: SDUPTHER

## 2021-10-04 ENCOUNTER — OFFICE VISIT (OUTPATIENT)
Dept: INTERNAL MEDICINE CLINIC | Age: 51
End: 2021-10-04
Payer: MEDICAID

## 2021-10-04 VITALS
HEART RATE: 68 BPM | OXYGEN SATURATION: 98 % | SYSTOLIC BLOOD PRESSURE: 116 MMHG | HEIGHT: 70 IN | TEMPERATURE: 98.9 F | WEIGHT: 270 LBS | BODY MASS INDEX: 38.65 KG/M2 | RESPIRATION RATE: 16 BRPM | DIASTOLIC BLOOD PRESSURE: 73 MMHG

## 2021-10-04 DIAGNOSIS — G57.12 MERALGIA PARESTHETICA OF LEFT SIDE: ICD-10-CM

## 2021-10-04 DIAGNOSIS — I63.341 CEREBRAL INFARCTION DUE TO THROMBOSIS OF RIGHT CEREBELLAR ARTERY (HCC): ICD-10-CM

## 2021-10-04 DIAGNOSIS — E78.2 MIXED HYPERLIPIDEMIA: ICD-10-CM

## 2021-10-04 DIAGNOSIS — I10 ESSENTIAL HYPERTENSION: ICD-10-CM

## 2021-10-04 DIAGNOSIS — K21.9 GASTROESOPHAGEAL REFLUX DISEASE: ICD-10-CM

## 2021-10-04 DIAGNOSIS — Z23 ENCOUNTER FOR IMMUNIZATION: ICD-10-CM

## 2021-10-04 DIAGNOSIS — Z12.11 SCREENING FOR COLON CANCER: ICD-10-CM

## 2021-10-04 DIAGNOSIS — F32.0 CURRENT MILD EPISODE OF MAJOR DEPRESSIVE DISORDER, UNSPECIFIED WHETHER RECURRENT (HCC): ICD-10-CM

## 2021-10-04 DIAGNOSIS — I69.354 HEMIPARESIS AFFECTING LEFT SIDE AS LATE EFFECT OF CEREBROVASCULAR ACCIDENT (CVA) (HCC): Primary | ICD-10-CM

## 2021-10-04 DIAGNOSIS — G44.019 EPISODIC CLUSTER HEADACHE, NOT INTRACTABLE: ICD-10-CM

## 2021-10-04 DIAGNOSIS — I89.0 LYMPHEDEMA: ICD-10-CM

## 2021-10-04 DIAGNOSIS — I25.10 CORONARY ARTERY DISEASE INVOLVING NATIVE CORONARY ARTERY OF NATIVE HEART WITHOUT ANGINA PECTORIS: ICD-10-CM

## 2021-10-04 PROCEDURE — 99214 OFFICE O/P EST MOD 30 MIN: CPT | Performed by: FAMILY MEDICINE

## 2021-10-04 RX ORDER — CLOPIDOGREL BISULFATE 75 MG/1
75 TABLET ORAL DAILY
Qty: 90 TABLET | Refills: 3 | Status: SHIPPED | OUTPATIENT
Start: 2021-10-04 | End: 2022-03-22

## 2021-10-04 RX ORDER — METOPROLOL TARTRATE 25 MG/1
TABLET, FILM COATED ORAL
Qty: 90 TABLET | Refills: 3 | Status: SHIPPED | OUTPATIENT
Start: 2021-10-04 | End: 2022-07-31

## 2021-10-04 RX ORDER — TOPIRAMATE 50 MG/1
50 TABLET, FILM COATED ORAL 2 TIMES DAILY
Qty: 180 TABLET | Refills: 3 | Status: SHIPPED | OUTPATIENT
Start: 2021-10-04 | End: 2021-11-29

## 2021-10-04 RX ORDER — CITALOPRAM 20 MG/1
30 TABLET, FILM COATED ORAL DAILY
Qty: 135 TABLET | Refills: 3 | Status: SHIPPED | OUTPATIENT
Start: 2021-10-04 | End: 2021-12-25

## 2021-10-04 RX ORDER — RANOLAZINE 1000 MG/1
1000 TABLET, EXTENDED RELEASE ORAL 2 TIMES DAILY
Qty: 180 TABLET | Refills: 3 | Status: SHIPPED | OUTPATIENT
Start: 2021-10-04 | End: 2022-11-01

## 2021-10-04 RX ORDER — PANTOPRAZOLE SODIUM 40 MG/1
40 TABLET, DELAYED RELEASE ORAL DAILY
Qty: 90 TABLET | Refills: 3 | Status: SHIPPED | OUTPATIENT
Start: 2021-10-04 | End: 2021-12-25

## 2021-10-04 RX ORDER — FREMANEZUMAB-VFRM 225 MG/1.5ML
1.5 INJECTION SUBCUTANEOUS
COMMUNITY
Start: 2021-07-02

## 2021-10-04 RX ORDER — ISOSORBIDE MONONITRATE 60 MG/1
TABLET, EXTENDED RELEASE ORAL
Qty: 90 TABLET | Refills: 3 | Status: SHIPPED | OUTPATIENT
Start: 2021-10-04

## 2021-10-04 RX ORDER — EVOLOCUMAB 140 MG/ML
INJECTION, SOLUTION SUBCUTANEOUS
Qty: 6 PEN | Refills: 4 | Status: SHIPPED | OUTPATIENT
Start: 2021-10-04 | End: 2022-03-01 | Stop reason: SDUPTHER

## 2021-10-04 NOTE — PROGRESS NOTES
Chief Complaint   Patient presents with    Leg Pain     R/lower ankle red and painful     Patient has not been out of the country in (14 months), NO diarrhea, NO cough, NO chest conjestion, NO temp. Pt has not been around anyone with these symptoms. Health Maintenance reviewed. I have reviewed the patient's medical history in detail and updated the computerized patient record. 1. Have you been to the ER, urgent care clinic since your last visit? No  Hospitalized since your last visit?  no    2. Have you seen or consulted any other health care providers outside of the [address] [place] [address] since your last visit? No  Include any pap smears or colon screening. Encouraged pt to discuss pt's wishes with spouse/partner/family and bring them in the next appt to follow thru with the Advanced Directive    @  [address], last 12 mths 10/4/2021   Able to walk? Yes   Fall in past 12 months? 1   Do you feel unsteady? -   Are you worried about falling 1   Is the gait abnormal? 1   Number of falls in past 12 months 2   Fall with injury?  1       3 most recent PHQ Screens 10/4/2021   Little interest or pleasure in doing things Several days   Feeling down, depressed, irritable, or hopeless Several days   Total Score PHQ 2 2   Trouble falling or staying asleep, or sleeping too much -   Feeling tired or having little energy -   Poor appetite, weight loss, or overeating -   Feeling bad about yourself - or that you are a failure or have let yourself or your family down -   Trouble concentrating on things such as school, work, reading, or watching TV -   Moving or speaking so slowly that other people could have noticed; or the opposite being so fidgety that others notice -   Thoughts of being better off dead, or hurting yourself in some way -   How difficult have these problems made it for you to do your work, take care of your home and get along with others -       Abuse Screening Questionnaire 10/4/2021 Do you ever feel afraid of your partner? N   Are you in a relationship with someone who physically or mentally threatens you? N   Is it safe for you to go home?  Y       ADL Assessment 10/4/2021   Feeding yourself No Help Needed   Getting from bed to chair No Help Needed   Getting dressed No Help Needed   Bathing or showering No Help Needed   Walk across the room (includes cane/walker) -   Using the telphone -   Taking your medications -   Preparing meals -   Managing money (expenses/bills) -   Moderately strenuous housework (laundry) -   Shopping for personal items (toiletries/medicines) -   Shopping for groceries -   Driving -   Climbing a flight of stairs -   Getting to places beyond walking distances -

## 2021-10-04 NOTE — PROGRESS NOTES
Subjective:     Roberto Belle is a 44 y.o. male who presents for follow up of hypertension, hyperlipidemia, coronary artery disease and history of prior stroke. New concerns: feels well in gen no C/o here with friend      Diet and Lifestyle: nonsmoker    Cardiovascular ROS:   Reports taking blood pressure medications without side affects. No complaints of exertional chest pain, excessive shortness of breath or focal weakness. Sl swelling in lower legs occa dizziness with standing. Review of Systems, additional:  Pertinent items are noted in HPI. Patient Active Problem List    Diagnosis Date Noted    Severe obesity (BMI 35.0-39. 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 90 Tablet 3    evolocumab (Repatha SureClick) pen injection Inject 140 mg under the skin every 2 weeks 6 Pen 4    mirabegron ER (Myrbetriq) 50 mg ER tablet Take 1 tablet by mouth once daily 90 Tablet 3    ranolazine ER (RANEXA) 1,000 mg Take 1 Tablet by mouth two (2) times a day.  180 Tablet 3    citalopram (CELEXA) 20 mg tablet Take 1.5 Tablets by mouth daily. 135 Tablet 3    metoprolol tartrate (LOPRESSOR) 25 mg tablet Take 1/2 (one-half) tablet by mouth twice daily 90 Tablet 3    clopidogreL (PLAVIX) 75 mg tab Take 1 Tablet by mouth daily. 90 Tablet 3    topiramate (TOPAMAX) 50 mg tablet Take 1 Tablet by mouth two (2) times a day. 180 Tablet 3    isosorbide mononitrate ER (IMDUR) 60 mg CR tablet TAKE 1 TABLET BY MOUTH IN THE MORNING 90 Tablet 3    Ajovy Autoinjector 225 mg/1.5 mL auto-injector 1.5 mL by SubCUTAneous route every month.  hydrocortisone (ANUSOL-HC) 2.5 % rectal cream Insert  into rectum four (4) times daily. 30 g 0    gabapentin (NEURONTIN) 300 mg capsule Take 300 mg by mouth two (2) times a day. 300 QAM & 600 QPM      aspirin delayed-release 81 mg tablet Take 1 Tab by mouth daily. 90 Tab 3    omega-3 fatty acids-vitamin e (FISH OIL) 1,000 mg cap Take 1 Cap by mouth daily.  fexofenadine (ALLEGRA) 180 mg tablet Take  by mouth daily.          Allergies   Allergen Reactions    Crestor [Rosuvastatin] Rash    Iodinated Contrast Media Hives    Statins-Hmg-Coa Reductase Inhibitors Myalgia     Past Medical History:   Diagnosis Date    CAD (coronary artery disease)     GERD (gastroesophageal reflux disease)     Headache(784.0)     Hypertension     Stroke ([facility] [address] 2011     Social History     Tobacco Use    Smoking status: Former Smoker     Packs/day: 0.30     Years: 30.00     Pack years: 9.00     Types: Cigarettes     Quit date: 9/19/2011     Years since quitting: 10.0    Smokeless tobacco: Never Used   Substance Use Topics    Alcohol use: No     Alcohol/week: 0.0 standard drinks        Lab Results   Component Value Date/Time    Cholesterol, total 190 07/28/2021 08:33 AM    HDL Cholesterol 36 (L) 07/28/2021 08:33 AM    LDL,Direct 70 07/26/2016 09:43 AM    LDL, calculated 109 (H) 07/28/2021 08:33 AM    LDL, calculated 69 02/19/2019 10:59 AM    Triglyceride 263 (H) 07/28/2021 08:33 AM CHOL/HDL Ratio 5.1 (H) 04/25/2017 12:15 AM     Lab Results   Component Value Date/Time    GFR est non-AA 73 07/28/2021 08:33 AM    GFR est AA 84 07/28/2021 08:33 AM    Creatinine 1.15 07/28/2021 08:33 AM    BUN 11 07/28/2021 08:33 AM    Sodium 141 07/28/2021 08:33 AM    Potassium 4.8 07/28/2021 08:33 AM    Chloride 106 07/28/2021 08:33 AM    CO2 21 07/28/2021 08:33 AM    Magnesium 1.8 04/26/2017 05:13 AM     Lab Results   Component Value Date/Time    Glucose 101 (H) 07/28/2021 08:33 AM    Glucose (POC) 132 (H) 04/25/2017 12:21 AM    Glucose (POC) 119 (H) 07/13/2012 08:15 AM             Objective:     Physical exam significant for the following:     Left sidedhemiparesis    Visit Vitals  /73 (BP 1 Location: Left arm, BP Patient Position: Sitting, BP Cuff Size: Adult)   Pulse 68   Temp 98.9 °F (37.2 °C) (Temporal)   Resp 16   Ht 5' 10\" (1.778 m)   Wt 270 lb (122.5 kg)   SpO2 98%   BMI 38.74 kg/m²     WD WN male NAD  Heart RRR without murmers clicks or rubs  Lungs CTA  Abdo soft nontender  Ext left ext edema    . Assessment/Plan:     hypertension well controlled, stable. ICD-10-CM ICD-9-CM    1. Hemiparesis affecting left side as late effect of cerebrovascular accident (CVA) ([facility])  Q06.369 438.20 evolocumab (Repatha SureClick) pen injection      topiramate (TOPAMAX) 50 mg tablet   2. Mixed hyperlipidemia  E78.2 272.2    3. Meralgia paresthetica of left side  G57.12 355.1    4. Essential hypertension  I10 401.9 metoprolol tartrate (LOPRESSOR) 25 mg tablet      isosorbide mononitrate ER (IMDUR) 60 mg CR tablet   5. Episodic cluster headache, not intractable  G44.019 339.01    6. Coronary artery disease involving native coronary artery of native heart without angina pectoris  I25.10 414.01    7. 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 Lymphedema  I89.0 457.1 AMB SUPPLY ORDER   9. Gastroesophageal reflux disease  K21.9 530.81 pantoprazole (PROTONIX) 40 mg tablet   10. Current mild episode of major depressive disorder, unspecified whether recurrent (HCC)  F32.0 296.21 citalopram (CELEXA) 20 mg tablet   11. Screening for colon cancer  Z12.11 V76.51 OCCULT BLOOD IMMUNOASSAY,DIAGNOSTIC      OCCULT BLOOD IMMUNOASSAY,DIAGNOSTIC   12. Encounter for immunization  Z23 V03.89      We discussed the covid vaccine. Discussed availability and how we prioritize patients to get it. Resources discussed. We discussed how important it is to get the vaccine and the relatively low side affects from it. Orders Placed This Encounter    AMB SUPPLY ORDER     Medical support   Medical compression hose: size to fit, knee high, medium compression 15-20 mmHG    OCCULT BLOOD IMMUNOASSAY,DIAGNOSTIC     Standing Status:   Future     Number of Occurrences:   1     Standing Expiration Date:   10/4/2022     Order Specific Question:   QUEST SOURCE     Answer:   Stool [1161]    Ajovy Autoinjector 225 mg/1.5 mL auto-injector     Si.5 mL by SubCUTAneous route every month.  pantoprazole (PROTONIX) 40 mg tablet     Sig: Take 1 Tablet by mouth daily. Dispense:  90 Tablet     Refill:  3    evolocumab (Repatha SureClick) pen injection     Sig: Inject 140 mg under the skin every 2 weeks     Dispense:  6 Pen     Refill:  4    mirabegron ER (Myrbetriq) 50 mg ER tablet     Sig: Take 1 tablet by mouth once daily     Dispense:  90 Tablet     Refill:  3    ranolazine ER (RANEXA) 1,000 mg     Sig: Take 1 Tablet by mouth two (2) times a day. Dispense:  180 Tablet     Refill:  3    citalopram (CELEXA) 20 mg tablet     Sig: Take 1.5 Tablets by mouth daily. Dispense:  135 Tablet     Refill:  3    metoprolol tartrate (LOPRESSOR) 25 mg tablet     Sig: Take 1/2 (one-half) tablet by mouth twice daily     Dispense:  90 Tablet     Refill:  3    clopidogreL (PLAVIX) 75 mg tab     Sig: Take 1 Tablet by mouth daily. Dispense:  90 Tablet     Refill:  3    topiramate (TOPAMAX) 50 mg tablet     Sig: Take 1 Tablet by mouth two (2) times a day. Dispense:  180 Tablet     Refill:  3    isosorbide mononitrate ER (IMDUR) 60 mg CR tablet     Sig: TAKE 1 TABLET BY MOUTH IN THE MORNING     Dispense:  90 Tablet     Refill:  3     Follow-up and Dispositions    · Return in about 4 months (around 2/4/2022) for routine follow up.

## 2022-01-17 ENCOUNTER — TELEPHONE (OUTPATIENT)
Dept: INTERNAL MEDICINE CLINIC | Age: 52
End: 2022-01-17

## 2022-02-01 ENCOUNTER — TELEPHONE (OUTPATIENT)
Dept: INTERNAL MEDICINE CLINIC | Age: 52
End: 2022-02-01

## 2022-02-01 NOTE — TELEPHONE ENCOUNTER
2/1/22 PA completed with LESLEE Jordan for Mirabegron ER (Mybetriq) 50 mg er tablets. Claim pending clinical review. Outcome will be fax to office. Please follow up as needed. Ref: # [id number]

## 2022-03-01 ENCOUNTER — OFFICE VISIT (OUTPATIENT)
Dept: INTERNAL MEDICINE CLINIC | Age: 52
End: 2022-03-01
Payer: MEDICAID

## 2022-03-01 VITALS
TEMPERATURE: 98.8 F | DIASTOLIC BLOOD PRESSURE: 77 MMHG | HEART RATE: 55 BPM | WEIGHT: 277 LBS | BODY MASS INDEX: 39.65 KG/M2 | HEIGHT: 70 IN | OXYGEN SATURATION: 98 % | RESPIRATION RATE: 18 BRPM | SYSTOLIC BLOOD PRESSURE: 131 MMHG

## 2022-03-01 DIAGNOSIS — S39.012A BACK STRAIN, INITIAL ENCOUNTER: Primary | ICD-10-CM

## 2022-03-01 DIAGNOSIS — I25.10 CORONARY ARTERY DISEASE INVOLVING NATIVE CORONARY ARTERY OF NATIVE HEART WITHOUT ANGINA PECTORIS: ICD-10-CM

## 2022-03-01 DIAGNOSIS — F32.0 CURRENT MILD EPISODE OF MAJOR DEPRESSIVE DISORDER, UNSPECIFIED WHETHER RECURRENT (HCC): ICD-10-CM

## 2022-03-01 DIAGNOSIS — I10 ESSENTIAL HYPERTENSION: ICD-10-CM

## 2022-03-01 DIAGNOSIS — E78.2 MIXED HYPERLIPIDEMIA: ICD-10-CM

## 2022-03-01 DIAGNOSIS — Z12.11 ENCOUNTER FOR SCREENING COLONOSCOPY: ICD-10-CM

## 2022-03-01 DIAGNOSIS — I69.354 HEMIPARESIS AFFECTING LEFT SIDE AS LATE EFFECT OF CEREBROVASCULAR ACCIDENT (CVA) (HCC): ICD-10-CM

## 2022-03-01 DIAGNOSIS — Z11.59 ENCOUNTER FOR HEPATITIS C SCREENING TEST FOR LOW RISK PATIENT: ICD-10-CM

## 2022-03-01 PROCEDURE — 99214 OFFICE O/P EST MOD 30 MIN: CPT | Performed by: FAMILY MEDICINE

## 2022-03-01 RX ORDER — EVOLOCUMAB 140 MG/ML
INJECTION, SOLUTION SUBCUTANEOUS
Qty: 6 PEN | Refills: 4 | Status: SHIPPED | OUTPATIENT
Start: 2022-03-01 | End: 2022-10-13 | Stop reason: SDUPTHER

## 2022-03-01 RX ORDER — TOPIRAMATE 50 MG/1
50 TABLET, FILM COATED ORAL 2 TIMES DAILY
Qty: 180 TABLET | Refills: 1 | Status: SHIPPED | OUTPATIENT
Start: 2022-03-01 | End: 2022-06-05

## 2022-03-01 RX ORDER — CITALOPRAM 20 MG/1
TABLET, FILM COATED ORAL
Qty: 135 TABLET | Refills: 1 | Status: SHIPPED | OUTPATIENT
Start: 2022-03-01 | End: 2022-08-29

## 2022-03-01 RX ORDER — CYCLOBENZAPRINE HCL 5 MG
5 TABLET ORAL
Qty: 20 TABLET | Refills: 0 | Status: SHIPPED | OUTPATIENT
Start: 2022-03-01 | End: 2022-07-07 | Stop reason: SDUPTHER

## 2022-03-01 NOTE — PROGRESS NOTES
Subjective:     Mariela Kwon is a 46 y.o. male who presents for follow up of hypertension and history of prior stroke. Here with significant other gnosis medications  New concerns: Past few days backs been hurting may have twisted it reaching for something, pain comes and goes  See neurology for headaches    Diet and Lifestyle: nonsmoker    Cardiovascular ROS:   Reports taking blood pressure medications without side affects. No complaints of exertional chest pain, excessive shortness of breath or focal weakness. Minimal swelling in lower legs or dizziness with standing. Review of Systems, additional:  Pertinent items are noted in HPI. Patient Active Problem List    Diagnosis Date Noted    Severe obesity (BMI 35.0-39. 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  20 Tablet 0    citalopram (CELEXA) 20 mg tablet TAKE 1 & 1/2 (ONE & ONE-HALF) TABLETS BY MOUTH ONCE DAILY 135 Tablet 1    topiramate (TOPAMAX) 50 mg tablet Take 1 Tablet by mouth two (2) times a day. 180 Tablet 1    evolocumab (Repatha SureClick) pen injection Inject 140 mg under the skin every 2 weeks 6 Pen 4    pantoprazole (PROTONIX) 40 mg tablet Take 1 tablet by mouth once daily 90 Tablet 1    Ajovy Autoinjector 225 mg/1.5 mL auto-injector 1.5 mL by SubCUTAneous route every month.  mirabegron ER (Myrbetriq) 50 mg ER tablet Take 1 tablet by mouth once daily 90 Tablet 3    ranolazine ER (RANEXA) 1,000 mg Take 1 Tablet by mouth two (2) times a day. 180 Tablet 3    metoprolol tartrate (LOPRESSOR) 25 mg tablet Take 1/2 (one-half) tablet by mouth twice daily 90 Tablet 3    clopidogreL (PLAVIX) 75 mg tab Take 1 Tablet by mouth daily. 90 Tablet 3    isosorbide mononitrate ER (IMDUR) 60 mg CR tablet TAKE 1 TABLET BY MOUTH IN THE MORNING 90 Tablet 3    hydrocortisone (ANUSOL-HC) 2.5 % rectal cream Insert  into rectum four (4) times daily. 30 g 0    gabapentin (NEURONTIN) 300 mg capsule Take 300 mg by mouth two (2) times a day. 300 QAM & 600 QPM      aspirin delayed-release 81 mg tablet Take 1 Tab by mouth daily. 90 Tab 3    omega-3 fatty acids-vitamin e (FISH OIL) 1,000 mg cap Take 1 Cap by mouth daily.  fexofenadine (ALLEGRA) 180 mg tablet Take  by mouth daily.          Allergies   Allergen Reactions    Crestor [Rosuvastatin] Rash    Iodinated Contrast Media Hives    Statins-Hmg-Coa Reductase Inhibitors Myalgia     Past Medical History:   Diagnosis Date    CAD (coronary artery disease)     GERD (gastroesophageal reflux disease)     Headache(784.0)     Hypertension     Stroke ([facility] [address] 2011     Social History     Tobacco Use    Smoking status: Former Smoker     Packs/day: 0.30     Years: 30.00     Pack years: 9.00     Types: Cigarettes     Quit date: 9/19/2011     Years since quitting: 10.4    Smokeless tobacco: Never Used   Substance Use Topics    Alcohol use: No     Alcohol/week: 0.0 standard drinks        Lab Results   Component Value Date/Time Cholesterol, total 190 03/01/2022 01:11 PM    HDL Cholesterol 34 (L) 03/01/2022 01:11 PM    LDL,Direct 70 07/26/2016 09:43 AM    LDL, calculated 105 (H) 03/01/2022 01:11 PM    LDL, calculated 69 02/19/2019 10:59 AM    Triglyceride 298 (H) 03/01/2022 01:11 PM    CHOL/HDL Ratio 5.1 (H) 04/25/2017 12:15 AM     Lab Results   Component Value Date/Time    ALT (SGPT) 19 07/28/2021 08:33 AM    Alk. phosphatase 53 07/28/2021 08:33 AM    Bilirubin, total 0.4 07/28/2021 08:33 AM    Albumin 4.3 07/28/2021 08:33 AM    Protein, total 6.7 07/28/2021 08:33 AM    INR 1.1 12/22/2019 12:55 PM    Prothrombin time 14.4 12/22/2019 12:55 PM    PLATELET 214 30/88/7300 01:11 PM     Lab Results   Component Value Date/Time    GFR est non-AA 73 07/28/2021 08:33 AM    GFR est AA 84 07/28/2021 08:33 AM    Creatinine 1.01 03/01/2022 01:11 PM    BUN 10 03/01/2022 01:11 PM    Sodium 139 03/01/2022 01:11 PM    Potassium 4.0 03/01/2022 01:11 PM    Chloride 102 03/01/2022 01:11 PM    CO2 20 03/01/2022 01:11 PM    Magnesium 1.8 04/26/2017 05:13 AM     Lab Results   Component Value Date/Time    Glucose 90 03/01/2022 01:11 PM    Glucose (POC) 132 (H) 04/25/2017 12:21 AM    Glucose (POC) 119 (H) 07/13/2012 08:15 AM             Objective:     Physical exam significant for the following:     Partial left-sided hemiparesis  Visit Vitals  /77 (BP 1 Location: Left arm, BP Patient Position: Sitting, BP Cuff Size: Adult)   Pulse (!) 55   Temp 98.8 °F (37.1 °C) (Temporal)   Resp 18   Ht 5' 10\" (1.778 m)   Wt 277 lb (125.6 kg)   SpO2 98%   BMI 39.75 kg/m²     WD WN male NAD    . Assessment/Plan:     hypertension well controlled, stable. ICD-10-CM ICD-9-CM    1. Back strain, initial encounter  S39.012A 847.9 cyclobenzaprine (FLEXERIL) 5 mg tablet   2. Essential hypertension  A12 614.8 METABOLIC PANEL, BASIC      CBC W/O DIFF      METABOLIC PANEL, BASIC      CBC W/O DIFF   3.  Coronary artery disease involving native coronary artery of native heart without angina pectoris  I25.10 414.01    4. Hemiparesis affecting left side as late effect of cerebrovascular accident (CVA) (HCC)  I69.354 438.20 topiramate (TOPAMAX) 50 mg tablet      evolocumab (Repatha SureClick) pen injection   5. Current mild episode of major depressive disorder, unspecified whether recurrent (HCC)  F32.0 296.21 citalopram (CELEXA) 20 mg tablet   6. Encounter for screening colonoscopy  Z12.11 V76.51 OCCULT BLOOD IMMUNOASSAY,DIAGNOSTIC      OCCULT BLOOD IMMUNOASSAY,DIAGNOSTIC   7. Mixed hyperlipidemia  E78.2 272.2 LIPID PANEL      LIPID PANEL   8.  Encounter for hepatitis C screening test for low risk patient  Z11.59 V73.89 HEP B SURFACE AG      RPR      HEPATITIS C AB, RFLX TO QT BY PCR      HIV 1/2 AG/AB, 4TH GENERATION,W RFLX CONFIRM      HEP B SURFACE AG      RPR      HEPATITIS C AB, RFLX TO QT BY PCR      HIV 1/2 AG/AB, 4TH GENERATION,W RFLX CONFIRM       Orders Placed This Encounter    OCCULT BLOOD IMMUNOASSAY,DIAGNOSTIC     Standing Status:   Future     Number of Occurrences:   1     Standing Expiration Date:   3/1/2023     Order Specific Question:   QUEST SOURCE     Answer:   Stool [1161]    LIPID PANEL     Standing Status:   Future     Number of Occurrences:   1     Standing Expiration Date:   7/5/5426    METABOLIC PANEL, BASIC     Standing Status:   Future     Number of Occurrences:   1     Standing Expiration Date:   9/1/2022    CBC W/O DIFF     Standing Status:   Future     Number of Occurrences:   1     Standing Expiration Date:   9/1/2022    HEP B SURFACE AG     Standing Status:   Future     Number of Occurrences:   1     Standing Expiration Date:   8/30/2022    RPR     Standing Status:   Future     Number of Occurrences:   1     Standing Expiration Date:   8/30/2022    HEPATITIS C AB, RFLX TO QT BY PCR     Standing Status:   Future     Number of Occurrences:   1     Standing Expiration Date:   9/1/2022    HIV 1/2 AG/AB, 4TH GENERATION,W RFLX CONFIRM     Standing Status: Future     Number of Occurrences:   1     Standing Expiration Date:   3/1/2023    cyclobenzaprine (FLEXERIL) 5 mg tablet     Sig: Take 1 Tablet by mouth three (3) times daily as needed for Muscle Spasm(s). Dispense:  20 Tablet     Refill:  0    citalopram (CELEXA) 20 mg tablet     Sig: TAKE 1 & 1/2 (ONE & ONE-HALF) TABLETS BY MOUTH ONCE DAILY     Dispense:  135 Tablet     Refill:  1    topiramate (TOPAMAX) 50 mg tablet     Sig: Take 1 Tablet by mouth two (2) times a day. Dispense:  180 Tablet     Refill:  1    evolocumab (Repatha SureClick) pen injection     Sig: Inject 140 mg under the skin every 2 weeks     Dispense:  6 Pen     Refill:  4     Follow-up and Dispositions    · Return in about 4 months (around 7/1/2022) for routine follow up.

## 2022-03-01 NOTE — PROGRESS NOTES
Chief Complaint   Patient presents with    Dizziness     R/ear pain x2 weeks    LOW BACK PAIN     low back x2 days     Patient has not been out of the country in (14 months), NO diarrhea, NO cough, NO chest conjestion, NO temp. Pt has not been around anyone with these symptoms. Health Maintenance reviewed. I have reviewed the patient's medical history in detail and updated the computerized patient record. 1. Have you been to the ER, urgent care clinic since your last visit? No  Hospitalized since your last visit?  no    2. Have you seen or consulted any other health care providers outside of the [address] [place] [address] since your last visit? No  Include any pap smears or colon screening. Encouraged pt to discuss pt's wishes with spouse/partner/family and bring them in the next appt to follow thru with the Advanced Directive    @  [facility], last 12 mths 10/4/2021   Able to walk? Yes   Fall in past 12 months? 1   Do you feel unsteady? -   Are you worried about falling 1   Is the gait abnormal? 1   Number of falls in past 12 months 2   Fall with injury?  1       3 most recent PHQ Screens 10/4/2021   Little interest or pleasure in doing things Several days   Feeling down, depressed, irritable, or hopeless Several days   Total Score PHQ 2 2   Trouble falling or staying asleep, or sleeping too much -   Feeling tired or having little energy -   Poor appetite, weight loss, or overeating -   Feeling bad about yourself - or that you are a failure or have let yourself or your family down -   Trouble concentrating on things such as school, work, reading, or watching TV -   Moving or speaking so slowly that other people could have noticed; or the opposite being so fidgety that others notice -   Thoughts of being better off dead, or hurting yourself in some way -   How difficult have these problems made it for you to do your work, take care of your home and get along with others -       Abuse Screening Questionnaire 10/4/2021   Do you ever feel afraid of your partner? N   Are you in a relationship with someone who physically or mentally threatens you? N   Is it safe for you to go home?  Y       ADL Assessment 10/4/2021   Feeding yourself No Help Needed   Getting from bed to chair No Help Needed   Getting dressed No Help Needed   Bathing or showering No Help Needed   Walk across the room (includes cane/walker) -   Using the telphone -   Taking your medications -   Preparing meals -   Managing money (expenses/bills) -   Moderately strenuous housework (laundry) -   Shopping for personal items (toiletries/medicines) -   Shopping for groceries -   Driving -   Climbing a flight of stairs -   Getting to places beyond walking distances -

## 2022-03-02 LAB
BUN SERPL-MCNC: 10 MG/DL (ref 6–24)
BUN/CREAT SERPL: 10 (ref 9–20)
CALCIUM SERPL-MCNC: 9.1 MG/DL (ref 8.7–10.2)
CHLORIDE SERPL-SCNC: 102 MMOL/L (ref 96–106)
CHOLEST SERPL-MCNC: 190 MG/DL (ref 100–199)
CO2 SERPL-SCNC: 20 MMOL/L (ref 20–29)
CREAT SERPL-MCNC: 1.01 MG/DL (ref 0.76–1.27)
EGFR: 89 ML/MIN/1.73
ERYTHROCYTE [DISTWIDTH] IN BLOOD BY AUTOMATED COUNT: 13 % (ref 11.6–15.4)
GLUCOSE SERPL-MCNC: 90 MG/DL (ref 65–99)
HBV SURFACE AG SERPL QL IA: NEGATIVE
HCT VFR BLD AUTO: 43.6 % (ref 37.5–51)
HCV AB S/CO SERPL IA: <0.1 S/CO RATIO (ref 0–0.9)
HCV AB SERPL QL IA: NORMAL
HDLC SERPL-MCNC: 34 MG/DL
HGB BLD-MCNC: 14.9 G/DL (ref 13–17.7)
HIV 1+2 AB+HIV1 P24 AG SERPL QL IA: NON REACTIVE
IMP & REVIEW OF LAB RESULTS: NORMAL
INTERPRETATION: NORMAL
LDLC SERPL CALC-MCNC: 105 MG/DL (ref 0–99)
MCH RBC QN AUTO: 34.3 PG (ref 26.6–33)
MCHC RBC AUTO-ENTMCNC: 34.2 G/DL (ref 31.5–35.7)
MCV RBC AUTO: 100 FL (ref 79–97)
PLATELET # BLD AUTO: 206 X10E3/UL (ref 150–450)
POTASSIUM SERPL-SCNC: 4 MMOL/L (ref 3.5–5.2)
RBC # BLD AUTO: 4.35 X10E6/UL (ref 4.14–5.8)
RPR SER QL: NON REACTIVE
SODIUM SERPL-SCNC: 139 MMOL/L (ref 134–144)
TRIGL SERPL-MCNC: 298 MG/DL (ref 0–149)
VLDLC SERPL CALC-MCNC: 51 MG/DL (ref 5–40)
WBC # BLD AUTO: 7.7 X10E3/UL (ref 3.4–10.8)

## 2022-03-18 PROBLEM — I69.354 HEMIPARESIS AFFECTING LEFT SIDE AS LATE EFFECT OF CEREBROVASCULAR ACCIDENT (CVA) (HCC): Status: ACTIVE | Noted: 2017-03-08

## 2022-03-19 PROBLEM — R29.898 LEFT ARM WEAKNESS: Status: ACTIVE | Noted: 2017-04-24

## 2022-03-19 PROBLEM — I25.10 CORONARY ARTERY DISEASE INVOLVING NATIVE CORONARY ARTERY OF NATIVE HEART WITHOUT ANGINA PECTORIS: Status: ACTIVE | Noted: 2017-03-08

## 2022-03-19 PROBLEM — E78.2 MIXED HYPERLIPIDEMIA: Status: ACTIVE | Noted: 2017-02-07

## 2022-03-20 PROBLEM — I10 ESSENTIAL HYPERTENSION: Status: ACTIVE | Noted: 2018-02-06

## 2022-03-20 PROBLEM — R29.810 FACIAL DROOP: Status: ACTIVE | Noted: 2017-04-24

## 2022-03-20 PROBLEM — E66.01 SEVERE OBESITY (BMI 35.0-39.9) WITH COMORBIDITY (HCC): Status: ACTIVE | Noted: 2018-04-02

## 2022-07-05 ENCOUNTER — TELEPHONE (OUTPATIENT)
Dept: INTERNAL MEDICINE CLINIC | Age: 52
End: 2022-07-05

## 2022-07-05 NOTE — TELEPHONE ENCOUNTER
7/5/22 Clair called  [phone]. PA attempted on Ranolazine ER 1000 Mg er tablets. Customer care rep Naomi GUTIERREZ,stated refill too soon,next fill date is 7/28/22,no PA needed.

## 2022-07-07 DIAGNOSIS — S39.012A BACK STRAIN, INITIAL ENCOUNTER: ICD-10-CM

## 2022-07-08 RX ORDER — CYCLOBENZAPRINE HCL 5 MG
5 TABLET ORAL
Qty: 20 TABLET | Refills: 0 | Status: SHIPPED | OUTPATIENT
Start: 2022-07-08

## 2022-07-30 DIAGNOSIS — I69.354 HEMIPARESIS AFFECTING LEFT SIDE AS LATE EFFECT OF CEREBROVASCULAR ACCIDENT (CVA) (HCC): ICD-10-CM

## 2022-07-30 DIAGNOSIS — I10 ESSENTIAL HYPERTENSION: ICD-10-CM

## 2022-07-31 RX ORDER — TOPIRAMATE 50 MG/1
TABLET, FILM COATED ORAL
Qty: 60 TABLET | Refills: 0 | Status: SHIPPED | OUTPATIENT
Start: 2022-07-31 | End: 2022-08-29

## 2022-07-31 RX ORDER — METOPROLOL TARTRATE 25 MG/1
TABLET, FILM COATED ORAL
Qty: 90 TABLET | Refills: 0 | Status: SHIPPED | OUTPATIENT
Start: 2022-07-31

## 2022-08-02 ENCOUNTER — DOCUMENTATION ONLY (OUTPATIENT)
Dept: INTERNAL MEDICINE CLINIC | Age: 52
End: 2022-08-02

## 2022-08-02 NOTE — PROGRESS NOTES
8/2/22 Key: Colin CAMILO Case ID: [id number] - Rx #: [id number]  Status  Sent to Plan today. May call plan @ [phone] if no response within 24 hours  Drug  Ranolazine ER 1000MG er tablets  Form  [employer] Electronic PA Form (5266 NCPDP)

## 2022-08-17 ENCOUNTER — TELEPHONE (OUTPATIENT)
Dept: INTERNAL MEDICINE CLINIC | Age: 52
End: 2022-08-17

## 2022-08-17 NOTE — TELEPHONE ENCOUNTER
8/17/22 Plan called @ [phone] PA completed with LESLEE LEIGHfor Petra Moore 614GN/ auto injectors. Ref:# [id number]. Turn around time for outcome is 24 hours. Please follow up as needed.

## 2022-08-18 ENCOUNTER — TELEPHONE (OUTPATIENT)
Dept: INTERNAL MEDICINE CLINIC | Age: 52
End: 2022-08-18

## 2022-08-18 NOTE — TELEPHONE ENCOUNTER
8/18/22 PA for Emery Otoole submitted 4/69/97 was denied. Claim resubmitted. PA Ref:# [id number]. Claim pending clinical review. LESLEE BARRrequested chart note and any supporting document to assist in clinical review,be faxed to [phone] ASAP,Please include Ref: # [id number] to documents. Turn around time for decision is within 24 hours.

## 2022-08-18 NOTE — TELEPHONE ENCOUNTER
PA request for Maggie Piedra has been denied:  need documentation of patients continued benefits from this drug by continued decrease in LDL-C level; maintenance of optimal LDL-C levels - please addend the office notes as necessary to show this documentation in order for medication approval to be reconsidered - or prescribe a different medication  Srinivasa Crockett LPN  5/87/0386  8:07 PM

## 2022-08-28 DIAGNOSIS — F32.0 CURRENT MILD EPISODE OF MAJOR DEPRESSIVE DISORDER, UNSPECIFIED WHETHER RECURRENT (HCC): ICD-10-CM

## 2022-08-28 DIAGNOSIS — I69.354 HEMIPARESIS AFFECTING LEFT SIDE AS LATE EFFECT OF CEREBROVASCULAR ACCIDENT (CVA) (HCC): ICD-10-CM

## 2022-08-28 DIAGNOSIS — K21.9 GASTROESOPHAGEAL REFLUX DISEASE: ICD-10-CM

## 2022-08-28 DIAGNOSIS — I63.341 CEREBRAL INFARCTION DUE TO THROMBOSIS OF RIGHT CEREBELLAR ARTERY (HCC): ICD-10-CM

## 2022-08-29 RX ORDER — TOPIRAMATE 50 MG/1
TABLET, FILM COATED ORAL
Qty: 60 TABLET | Refills: 0 | Status: SHIPPED | OUTPATIENT
Start: 2022-08-29 | End: 2022-10-02

## 2022-08-29 RX ORDER — CITALOPRAM 20 MG/1
TABLET, FILM COATED ORAL
Qty: 135 TABLET | Refills: 0 | Status: SHIPPED | OUTPATIENT
Start: 2022-08-29

## 2022-08-29 RX ORDER — CLOPIDOGREL BISULFATE 75 MG/1
TABLET ORAL
Qty: 90 TABLET | Refills: 0 | Status: SHIPPED | OUTPATIENT
Start: 2022-08-29

## 2022-08-29 RX ORDER — PANTOPRAZOLE SODIUM 40 MG/1
TABLET, DELAYED RELEASE ORAL
Qty: 90 TABLET | Refills: 0 | Status: SHIPPED | OUTPATIENT
Start: 2022-08-29

## 2022-10-18 ENCOUNTER — DOCUMENTATION ONLY (OUTPATIENT)
Dept: INTERNAL MEDICINE CLINIC | Age: 52
End: 2022-10-18

## 2022-10-18 NOTE — PROGRESS NOTES
PA completed with LESLEE ZHANG,for Repjuan SureClick 232 MG/ML auto-injectors. Please fax supporting documents to Fax # [phone],[facility] help with approval.Please include Ref: # [id number]. Claim pending review,turn around time is 24 hours. Decision will be faxed to office. Please follow up as needed.

## 2022-12-06 ENCOUNTER — DOCUMENTATION ONLY (OUTPATIENT)
Dept: INTERNAL MEDICINE CLINIC | Age: 52
End: 2022-12-06

## 2022-12-06 NOTE — PROGRESS NOTES
Clair hodge @ [phone] PA completed with LESLEE GARAY,for Pantoprazole Sodium 40MG dr tablets. Turn around time for outcome is 24 hours. May fax supporting documents to fax # [phone],[facility] include Ref: # [id number]. Please follow up as needed.

## 2022-12-08 ENCOUNTER — DOCUMENTATION ONLY (OUTPATIENT)
Dept: INTERNAL MEDICINE CLINIC | Age: 52
End: 2022-12-08

## 2022-12-08 NOTE — PROGRESS NOTES
Faxed PA form signed by Dr Deepa Montague to [address] at [phone] to attempt PA for Pantoprazole 40mg continuation - confirmation of receipt received - awaiting response  Evelyn Smith LPN  22/9/3475  4:72 AM

## 2022-12-08 NOTE — PROGRESS NOTES
Plan called @ [phone] PA completed with PA rep Daksha Lowry, for Pantoprazole 40 MG dr tablets due to prior denial on quantity. Ref: # [id number]. Turn around time for outcome to be faxed to office is 24 hours. Claim pending outcome. Please follow up as needed. Thanks

## 2023-01-11 ENCOUNTER — DOCUMENTATION ONLY (OUTPATIENT)
Dept: INTERNAL MEDICINE CLINIC | Age: 53
End: 2023-01-11

## 2023-01-11 NOTE — PROGRESS NOTES
Key: [id number] for Myrbetriq 50 mg er tablets. Plan called @ [phone]. REP, Aman ESTEVEZ,stated no PA needed,too soon refill,next refill date is 1/22/23.

## 2023-02-13 ENCOUNTER — VIRTUAL VISIT (OUTPATIENT)
Dept: INTERNAL MEDICINE CLINIC | Age: 53
End: 2023-02-13
Payer: COMMERCIAL

## 2023-02-13 DIAGNOSIS — I69.354 HEMIPARESIS AFFECTING LEFT SIDE AS LATE EFFECT OF CEREBROVASCULAR ACCIDENT (CVA) (HCC): ICD-10-CM

## 2023-02-13 DIAGNOSIS — I63.341 CEREBRAL INFARCTION DUE TO THROMBOSIS OF RIGHT CEREBELLAR ARTERY (HCC): ICD-10-CM

## 2023-02-13 DIAGNOSIS — K21.9 GASTROESOPHAGEAL REFLUX DISEASE: ICD-10-CM

## 2023-02-13 DIAGNOSIS — I10 ESSENTIAL HYPERTENSION: ICD-10-CM

## 2023-02-13 DIAGNOSIS — E78.2 MIXED HYPERLIPIDEMIA: Primary | ICD-10-CM

## 2023-02-13 DIAGNOSIS — F32.0 CURRENT MILD EPISODE OF MAJOR DEPRESSIVE DISORDER, UNSPECIFIED WHETHER RECURRENT (HCC): ICD-10-CM

## 2023-02-13 PROCEDURE — 99214 OFFICE O/P EST MOD 30 MIN: CPT | Performed by: FAMILY MEDICINE

## 2023-02-13 RX ORDER — CITALOPRAM 20 MG/1
20 TABLET, FILM COATED ORAL DAILY
Qty: 90 TABLET | Refills: 0 | Status: SHIPPED | OUTPATIENT
Start: 2023-02-13

## 2023-02-13 RX ORDER — CLOPIDOGREL BISULFATE 75 MG/1
75 TABLET ORAL DAILY
Qty: 90 TABLET | Refills: 0 | Status: SHIPPED | OUTPATIENT
Start: 2023-02-13

## 2023-02-13 RX ORDER — ISOSORBIDE MONONITRATE 60 MG/1
60 TABLET, EXTENDED RELEASE ORAL
Qty: 90 TABLET | Refills: 0 | Status: SHIPPED | OUTPATIENT
Start: 2023-02-13

## 2023-02-13 RX ORDER — RANOLAZINE 1000 MG/1
1000 TABLET, EXTENDED RELEASE ORAL EVERY 12 HOURS
Qty: 180 TABLET | Refills: 0 | Status: SHIPPED | OUTPATIENT
Start: 2023-02-13

## 2023-02-13 RX ORDER — PANTOPRAZOLE SODIUM 40 MG/1
40 TABLET, DELAYED RELEASE ORAL DAILY
Qty: 90 TABLET | Refills: 0 | Status: SHIPPED | OUTPATIENT
Start: 2023-02-13

## 2023-02-13 RX ORDER — TOPIRAMATE 50 MG/1
50 TABLET, FILM COATED ORAL 2 TIMES DAILY
Qty: 180 TABLET | Refills: 0 | Status: SHIPPED | OUTPATIENT
Start: 2023-02-13

## 2023-02-13 RX ORDER — METOPROLOL TARTRATE 25 MG/1
12.5 TABLET, FILM COATED ORAL 2 TIMES DAILY
Qty: 90 TABLET | Refills: 0 | Status: SHIPPED | OUTPATIENT
Start: 2023-02-13

## 2023-02-13 NOTE — PROGRESS NOTES
Chief Complaint   Patient presents with    Medication Refill     Patient is aware that this is a Virtual Visit or Phone Call Only doctor's visit. Patient has not been out of the country in (14 months), NO diarrhea, NO cough, NO chest conjestion, NO temp. Pt has not been around anyone with these symptoms. Health Maintenance reviewed. I have reviewed the patient's medical history in detail and updated the computerized patient record. Have you been to the ER, urgent care clinic since your last visit? No  Hospitalized since your last visit?  no    2. Have you seen or consulted any other health care providers outside of the [address] [place] [address] since your last visit? No  Include any pap smears or colon screening. Encouraged pt to discuss pt's wishes with spouse/partner/family and bring them in the next appt to follow thru with the Advanced Directive      Fall Risk Assessment, last 12 mths 10/4/2021   Able to walk? Yes   Fall in past 12 months? 1   Do you feel unsteady? -   Are you worried about falling 1   Is the gait abnormal? 1   Number of falls in past 12 months 2   Fall with injury?  1       3 most recent PHQ Screens 10/4/2021   Little interest or pleasure in doing things Several days   Feeling down, depressed, irritable, or hopeless Several days   Total Score PHQ 2 2   Trouble falling or staying asleep, or sleeping too much -   Feeling tired or having little energy -   Poor appetite, weight loss, or overeating -   Feeling bad about yourself - or that you are a failure or have let yourself or your family down -   Trouble concentrating on things such as school, work, reading, or watching TV -   Moving or speaking so slowly that other people could have noticed; or the opposite being so fidgety that others notice -   Thoughts of being better off dead, or hurting yourself in some way -   How difficult have these problems made it for you to do your work, take care of your home and get along with others -       Abuse Screening Questionnaire 10/4/2021   Do you ever feel afraid of your partner? N   Are you in a relationship with someone who physically or mentally threatens you? N   Is it safe for you to go home?  Y       ADL Assessment 10/4/2021   Feeding yourself No Help Needed   Getting from bed to chair No Help Needed   Getting dressed No Help Needed   Bathing or showering No Help Needed   Walk across the room (includes cane/walker) -   Using the telphone -   Taking your medications -   Preparing meals -   Managing money (expenses/bills) -   Moderately strenuous housework (laundry) -   Shopping for personal items (toiletries/medicines) -   Shopping for groceries -   Driving -   Climbing a flight of stairs -   Getting to places beyond walking distances -

## 2023-02-13 NOTE — PROGRESS NOTES
Subjective:     Ab Gonzalez is a 45 y.o. male who presents for follow up of   Caretaker present  We have not seen him in almost a year. Has been seeing specialists. Patient Active Problem List   Diagnosis Code    Abnormal EKG R94.31    Family history of early CAD Z82.49    Dizziness R42    CAD (coronary artery disease) I25.10    S/P PTCA (percutaneous transluminal coronary angioplasty) Z98.61    Meralgia paresthetica of left side G57.12    Depression F32. 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 9) with comorbidity ([address] E66.01       New concerns:  meds   Feels okay but he has been out of his medicines for almost a week. He is noted some chest discomfort for the last couple of days, its not exertional.  Refill his medicines except for Repatha, his cardiologist feels that      Cardiac ROS:   Reports not taking cardiovascuar medications. Ran out previously prescribed medications were without side affects. No complaints of exertional chest pain, excessive shortness of breath or focal weakness. Minimal swelling in lower legs or dizziness with standing. Review of Systems, additional:  Pertinent items are noted in HPI.         Allergies   Allergen Reactions    Crestor [Rosuvastatin] Rash    Iodinated Contrast Media Hives    Statins-Hmg-Coa Reductase Inhibitors Myalgia     Past Medical History:   Diagnosis Date    CAD (coronary artery disease)     GERD (gastroesophageal reflux disease)     Headache(784.0)     Hypertension     Stroke ([facility] 2011 Social History     Tobacco Use    Smoking status: Former     Packs/day: 0.30     Years: 30.00     Pack years: 9.00     Types: Cigarettes     Quit date: 2011     Years since quittin.4    Smokeless tobacco: Never   Substance Use Topics    Alcohol use: No     Alcohol/week: 0.0 standard drinks          Objective:     Physical exam significant for the following:     Appears to be in no acute distress, grossly 2 through 12 are nonfocal.      .   Assessment/Plan:     hypertension control uncertain. ICD-10-CM ICD-9-CM    1. Mixed hyperlipidemia  E78.2 272.2 LIPID PANEL      LIPID PANEL      2. Essential hypertension  I10 401.9 isosorbide mononitrate ER (IMDUR) 60 mg CR tablet      metoprolol tartrate (LOPRESSOR) 25 mg tablet      METABOLIC PANEL, COMPREHENSIVE      CBC W/O DIFF      METABOLIC PANEL, COMPREHENSIVE      CBC W/O DIFF      3. 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 Hemiparesis affecting left side as late effect of cerebrovascular accident (CVA) ([facility])  I69.354 438.20 topiramate (TOPAMAX) 50 mg tablet      5. Current mild episode of major depressive disorder, unspecified whether recurrent ([facility])  F32.0 296.21 citalopram (CELEXA) 20 mg tablet      6. Gastroesophageal reflux disease  K21.9 530.81 pantoprazole (PROTONIX) 40 mg tablet          Orders Placed This Encounter    LIPID PANEL     Standing Status:   Future     Number of Occurrences:   1     Standing Expiration Date:       METABOLIC PANEL, COMPREHENSIVE     Standing Status:   Future     Number of Occurrences:   1     Standing Expiration Date:   2023    CBC W/O DIFF     Standing Status:   Future     Number of Occurrences:   1     Standing Expiration Date:   2023    isosorbide mononitrate ER (IMDUR) 60 mg CR tablet     Sig: Take 1 Tablet by mouth every morning.  TAKE 1 TABLET BY MOUTH IN THE MORNING Dispense:  90 Tablet     Refill:  0     Appointment needed to refill this medication again, virtual or phone visit. ranolazine ER (RANEXA) 1,000 mg     Sig: Take 1 Tablet by mouth every twelve (12) hours. Dispense:  180 Tablet     Refill:  0     Appointment needed to refill this medication again, virtual or phone visit. clopidogreL (PLAVIX) 75 mg tab     Sig: Take 1 Tablet by mouth daily. Dispense:  90 Tablet     Refill:  0     Appointment needed to refill this medication again, virtual or phone visit. topiramate (TOPAMAX) 50 mg tablet     Sig: Take 1 Tablet by mouth two (2) times a day. Dispense:  180 Tablet     Refill:  0     Appointment needed to refill this medication again, virtual or phone visit. citalopram (CELEXA) 20 mg tablet     Sig: Take 1 Tablet by mouth daily. Dispense:  90 Tablet     Refill:  0    mirabegron ER (Myrbetriq) 50 mg ER tablet     Sig: Take 1 tablet by mouth once daily     Dispense:  90 Tablet     Refill:  0    metoprolol tartrate (LOPRESSOR) 25 mg tablet     Sig: Take 0.5 Tablets by mouth two (2) times a day. Dispense:  90 Tablet     Refill:  0    pantoprazole (PROTONIX) 40 mg tablet     Sig: Take 1 Tablet by mouth daily. Dispense:  90 Tablet     Refill:  0     Appointment needed to refill this medication again, virtual or phone visit. Get back on his medications as above  Chest pain precautions given  Patient was instructed on the limits of making a diagnosis at this visit. Was instructed to call us or go to the emergency room if the symptoms increased or if new symptoms appeared. Current Outpatient Medications   Medication Sig Dispense Refill    isosorbide mononitrate ER (IMDUR) 60 mg CR tablet Take 1 Tablet by mouth every morning. TAKE 1 TABLET BY MOUTH IN THE MORNING 90 Tablet 0    ranolazine ER (RANEXA) 1,000 mg Take 1 Tablet by mouth every twelve (12) hours. 180 Tablet 0    clopidogreL (PLAVIX) 75 mg tab Take 1 Tablet by mouth daily.  80 Tablet 0    topiramate (TOPAMAX) 50 mg tablet Take 1 Tablet by mouth two (2) times a day. 180 Tablet 0    citalopram (CELEXA) 20 mg tablet Take 1 Tablet by mouth daily. 90 Tablet 0    mirabegron ER (Myrbetriq) 50 mg ER tablet Take 1 tablet by mouth once daily 90 Tablet 0    metoprolol tartrate (LOPRESSOR) 25 mg tablet Take 0.5 Tablets by mouth two (2) times a day. 90 Tablet 0    pantoprazole (PROTONIX) 40 mg tablet Take 1 Tablet by mouth daily. 90 Tablet 0    evolocumab (Repatha SureClick) pen injection Inject 140 mg under the skin every 2 weeks 6 Pen 1    cyclobenzaprine (FLEXERIL) 5 mg tablet Take 1 Tablet by mouth three (3) times daily as needed for Muscle Spasm(s). 20 Tablet 0    Ajovy Autoinjector 225 mg/1.5 mL auto-injector 1.5 mL by SubCUTAneous route every month. hydrocortisone (ANUSOL-HC) 2.5 % rectal cream Insert  into rectum four (4) times daily. 30 g 0    gabapentin (NEURONTIN) 300 mg capsule Take 300 mg by mouth two (2) times a day. 300 QAM & 600 QPM      aspirin delayed-release 81 mg tablet Take 1 Tab by mouth daily. 90 Tab 3    omega-3 fatty acids-vitamin e (FISH OIL) 1,000 mg cap Take 1 Cap by mouth daily. fexofenadine (ALLEGRA) 180 mg tablet Take  by mouth daily. Dorcas Chan, who was evaluated through a synchronous (real-time) audio-video encounter, and/or his healthcare decision maker, is aware that it is a billable service, which includes applicable co-pays, with coverage as determined by his insurance carrier. He provided verbal consent to proceed and patient identification was verified. This visit was conducted pursuant to the emergency declaration under the [facility], [address] [place] [address] authority and the [employer] General Act. A caregiver was present when appropriate. Ability to conduct physical exam was limited.  The patient was located at: Home: [address] [place] [address]  The provider was located at:  [facility]ility [facility] [address] Ruthie Gómez MD on 2/13/2023 at 4:36 PM      Follow-up 1 month in the office

## 2023-02-13 NOTE — LETTER
2/13/2023 4:55 PM    Mr. Ang Seats [address] [facility]        Dear  Verner Blue:    Hilda Estrada you will find your lab orders.       Sincerely,      Anita Zuleta MD

## 2023-02-14 DIAGNOSIS — N31.8 BLADDER HYPERTONICITY: Primary | ICD-10-CM

## 2023-02-15 ENCOUNTER — DOCUMENTATION ONLY (OUTPATIENT)
Dept: INTERNAL MEDICINE CLINIC | Age: 53
End: 2023-02-15

## 2023-02-15 NOTE — PROGRESS NOTES
Plan called [phone] PA completed with Customer care rep Wendy ZHANG,for Myrbetriq 50 mg er tablets. Turn around time for outcome to be faxed to office is 24 hours. Case ID: [id number]. Case pending clinical review. Please follow up as needed when received by fax.

## 2023-02-20 ENCOUNTER — VIRTUAL VISIT (OUTPATIENT)
Dept: INTERNAL MEDICINE CLINIC | Age: 53
End: 2023-02-20
Payer: COMMERCIAL

## 2023-02-20 DIAGNOSIS — I10 ESSENTIAL HYPERTENSION: ICD-10-CM

## 2023-02-20 DIAGNOSIS — J01.00 SUBACUTE MAXILLARY SINUSITIS: Primary | ICD-10-CM

## 2023-02-20 PROCEDURE — 99213 OFFICE O/P EST LOW 20 MIN: CPT | Performed by: FAMILY MEDICINE

## 2023-02-20 RX ORDER — AMOXICILLIN 500 MG/1
500 TABLET, FILM COATED ORAL 3 TIMES DAILY
Qty: 21 TABLET | Refills: 0 | Status: SHIPPED | OUTPATIENT
Start: 2023-02-20 | End: 2023-02-27

## 2023-02-20 NOTE — PROGRESS NOTES
Jon Lee, who was evaluated through a synchronous (real-time) audio-video encounter, and/or his healthcare decision maker, is aware that it is a billable service, which includes applicable co-pays, with coverage as determined by his insurance carrier. He provided verbal consent to proceed and patient identification was verified. This visit was conducted pursuant to the emergency declaration under the [facility] [address] and the [employer] General Act. A caregiver was present when appropriate. Ability to conduct physical exam was limited. The patient was located at: Home: [address] [place] [address]  The provider was located at: [facility] [address] Ruthie Mallory MD on 2/20/2023 at 4:37 PM              Subjective:   Jon Lee is a 45 y.o. male who complains of congestion, nasal blockage, cough described as productive of clear sputum, and bilateral sinus pain for 2-3 days, stable since that time. He denies a history of fevers, shortness of breath, and wheezing. Evaluation to date: covid test neg yesterday. Treatment to date: none. Patient does not smoke cigarettes. Relevant PMH: No pertinent additional PMH. Allergies   Allergen Reactions    Crestor [Rosuvastatin] Rash    Iodinated Contrast Media Hives    Statins-Hmg-Coa Reductase Inhibitors Myalgia     Patient Active Problem List   Diagnosis Code    Abnormal EKG R94.31    Family history of early CAD Z82.49    Dizziness R42    CAD (coronary artery disease) I25.10    S/P PTCA (percutaneous transluminal coronary angioplasty) Z98.61    Meralgia paresthetica of left side G57.12    Depression F32. 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 9) with comorbidity ([facility] [address] E66.01              Review of Systems  Pertinent items are noted in HPI. Objective:     Appears to be in no acute distress, grossly 2 through 12 are nonfocal.        Assessment/Plan:       ICD-10-CM ICD-9-CM    1. Subacute maxillary sinusitis  J01.00 461.0 amoxicillin (AMOXIL) 500 mg tablet      2. Essential hypertension  I10 401.9           Orders Placed This Encounter    amoxicillin (AMOXIL) 500 mg tablet     Sig: Take 1 Tablet by mouth three (3) times daily for 7 days.      Dispense:  21 Tablet     Refill:  0       F/up prn

## 2023-03-12 NOTE — PROGRESS NOTES
Subjective:     Sarah Goel is a 45 y.o. male who presents for follow up of   Patient Active Problem List   Diagnosis Code    Abnormal EKG R94.31    Family history of early CAD Z82.49    Dizziness R42    CAD (coronary artery disease) I25.10    S/P PTCA (percutaneous transluminal coronary angioplasty) Z98.61    Meralgia paresthetica of left side G57.12    Depression F32. 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 9) with comorbidity ([address] E66.01       New concerns: rf meds sinbuses better left ear clicks finished Ab no npain  Here with caretaker as usual      Cardiac ROS:   Reports taking cardiovascuar medications without side affects. No complaints of exertional chest pain, excessive shortness of breath or focal weakness. Minimal swelling in lower legs or dizziness with standing. Review of Systems, additional:  Pertinent items are noted in HPI. Patient Active Problem List    Diagnosis Date Noted    Severe obesity (BMI 35.0-39. 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 [address] 05/29/2015    Depression 03/04/2015    Meralgia paresthetica of left side 09/11/2013    CAD (coronary artery disease) 07/13/2012    S/P PTCA (percutaneous transluminal coronary angioplasty) 07/13/2012    Abnormal EKG 06/14/2012    Family history of early CAD 06/14/2012    Dizziness 06/14/2012     Current Outpatient Medications   Medication Sig Dispense Refill    mirabegron ER (Myrbetriq) 50 mg ER tablet Take 1 tablet by mouth once daily 90 Tablet 1    isosorbide mononitrate ER (IMDUR) 60 mg CR tablet Take 1 Tablet by mouth every morning. 90 Tablet 1    ranolazine ER (RANEXA) 1,000 mg Take 1 Tablet by mouth every twelve (12) hours. 180 Tablet 1    clopidogreL (PLAVIX) 75 mg tab Take 1 Tablet by mouth daily. 90 Tablet 1    topiramate (TOPAMAX) 50 mg tablet Take 1 Tablet by mouth two (2) times a day. 180 Tablet 1    citalopram (CELEXA) 20 mg tablet Take 1 Tablet by mouth daily. 90 Tablet 1    metoprolol tartrate (LOPRESSOR) 25 mg tablet Take 0.5 Tablets by mouth two (2) times a day. 90 Tablet 1    pantoprazole (PROTONIX) 40 mg tablet Take 1 Tablet by mouth daily. 90 Tablet 1    evolocumab (Repatha SureClick) pen injection Inject 140 mg under the skin every 2 weeks 6 Pen 1    Ajovy Autoinjector 225 mg/1.5 mL auto-injector 1.5 mL by SubCUTAneous route every month. hydrocortisone (ANUSOL-HC) 2.5 % rectal cream Insert  into rectum four (4) times daily. 30 g 0    gabapentin (NEURONTIN) 300 mg capsule Take 300 mg by mouth two (2) times a day. 300 QAM & 600 QPM      aspirin delayed-release 81 mg tablet Take 1 Tab by mouth daily. 90 Tab 3    omega-3 fatty acids-vitamin e 1,000 mg cap Take 1 Cap by mouth daily. fexofenadine (ALLEGRA) 180 mg tablet Take  by mouth daily.          Allergies   Allergen Reactions    Crestor [Rosuvastatin] Rash    Iodinated Contrast Media Hives    Statins-Hmg-Coa Reductase Inhibitors Myalgia     Past Medical History:   Diagnosis Date    CAD (coronary artery disease)     GERD (gastroesophageal reflux disease)     Headache(784.0)     Hypertension     Stroke ([address]      Social History     Tobacco Use    Smoking status: Former     Packs/day: 0.30     Years: 30.00     Pack years: 9.00     Types: Cigarettes     Quit date: 2011     Years since quittin.4    Smokeless tobacco: Never   Substance Use Topics    Alcohol use: No     Alcohol/week: 0.0 standard drinks        Lab Results   Component Value Date/Time    Cholesterol, total 157 2023 01:20 PM    HDL Cholesterol 37 (L) 2023 01:20 PM    LDL,Direct 70 2016 09:43 AM    LDL, calculated 84 2023 01:20 PM    LDL, calculated 69 2019 10:59 AM    Triglyceride 211 (H) 2023 01:20 PM    CHOL/HDL Ratio 5.1 (H) 2017 12:15 AM     Lab Results   Component Value Date/Time    GFR est non-AA 73 2021 08:33 AM    GFR est AA 84 2021 08:33 AM    Creatinine 0.91 2023 01:20 PM    BUN 7 2023 01:20 PM    Sodium 139 2023 01:20 PM    Potassium 3.7 2023 01:20 PM    Chloride 104 2023 01:20 PM    CO2 18 (L) 2023 01:20 PM    Magnesium 1.8 2017 05:13 AM     Lab Results   Component Value Date/Time    Glucose 92 2023 01:20 PM    Glucose (POC) 132 (H) 2017 12:21 AM    Glucose (POC) 119 (H) 2012 08:15 AM             Objective:     Physical exam significant for the following:     Visit Vitals  /82 (BP 1 Location: Left arm, BP Patient Position: Sitting, BP Cuff Size: Adult long)   Pulse 70   Temp 98.6 °F (37 °C) (Temporal)   Resp 16   Ht 5' 10\" (1.778 m)   Wt 275 lb (124.7 kg)   SpO2 96%   BMI 39.46 kg/m²     WD WN male NAD  Heart RRR without murmers clicks or rubs  Lungs CTA  Abdo soft nontender  Ext no edema  Left-sided hemiparesis left arm in sling    . Assessment/Plan:     hypertension well controlled, stable, cerebrovascular disease stable.       ICD-10-CM ICD-9-CM    1. Screening for colon cancer  Z12.11 V76.51 OCCULT BLOOD IMMUNOASSAY,DIAGNOSTIC OCCULT BLOOD IMMUNOASSAY,DIAGNOSTIC      2. Bladder hypertonicity  N31.8 596.51 mirabegron ER (Myrbetriq) 50 mg ER tablet      3. Essential hypertension  I10 401.9 isosorbide mononitrate ER (IMDUR) 60 mg CR tablet      metoprolol tartrate (LOPRESSOR) 25 mg tablet      4. 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)  I63.341 434.01 ranolazine ER (RANEXA) 1,000 mg      clopidogreL (PLAVIX) 75 mg tab      5. Hemiparesis affecting left side as late effect of cerebrovascular accident (CVA) ([facility])  I69.354 438.20 topiramate (TOPAMAX) 50 mg tablet      evolocumab (Repatha SureClick) pen injection      6. Current mild episode of major depressive disorder, unspecified whether recurrent ([facility])  F32.0 296.21 citalopram (CELEXA) 20 mg tablet      7. Gastroesophageal reflux disease  K21.9 530.81 pantoprazole (PROTONIX) 40 mg tablet            Orders Placed This Encounter    OCCULT BLOOD IMMUNOASSAY,DIAGNOSTIC     Standing Status:   Future     Number of Occurrences:   1     Standing Expiration Date:   3/13/2024     Order Specific Question:   QUEST SOURCE     Answer:   Stool [1161]    mirabegron ER (Myrbetriq) 50 mg ER tablet     Sig: Take 1 tablet by mouth once daily     Dispense:  90 Tablet     Refill:  1    isosorbide mononitrate ER (IMDUR) 60 mg CR tablet     Sig: Take 1 Tablet by mouth every morning. Dispense:  90 Tablet     Refill:  1    ranolazine ER (RANEXA) 1,000 mg     Sig: Take 1 Tablet by mouth every twelve (12) hours. Dispense:  180 Tablet     Refill:  1    clopidogreL (PLAVIX) 75 mg tab     Sig: Take 1 Tablet by mouth daily. Dispense:  90 Tablet     Refill:  1    topiramate (TOPAMAX) 50 mg tablet     Sig: Take 1 Tablet by mouth two (2) times a day. Dispense:  180 Tablet     Refill:  1    citalopram (CELEXA) 20 mg tablet     Sig: Take 1 Tablet by mouth daily.      Dispense:  90 Tablet     Refill:  1    metoprolol tartrate (LOPRESSOR) 25 mg tablet     Sig: Take 0.5 Tablets by mouth two (2) times a day. Dispense:  90 Tablet     Refill:  1    pantoprazole (PROTONIX) 40 mg tablet     Sig: Take 1 Tablet by mouth daily.      Dispense:  90 Tablet     Refill:  1    evolocumab (Repatha SureClick) pen injection     Sig: Inject 140 mg under the skin every 2 weeks     Dispense:  6 Pen     Refill:  1       3-month follow-up

## 2023-03-13 ENCOUNTER — OFFICE VISIT (OUTPATIENT)
Dept: INTERNAL MEDICINE CLINIC | Age: 53
End: 2023-03-13
Payer: MEDICAID

## 2023-03-13 VITALS
TEMPERATURE: 98.6 F | WEIGHT: 275 LBS | SYSTOLIC BLOOD PRESSURE: 128 MMHG | HEART RATE: 70 BPM | RESPIRATION RATE: 16 BRPM | OXYGEN SATURATION: 96 % | DIASTOLIC BLOOD PRESSURE: 82 MMHG | HEIGHT: 70 IN | BODY MASS INDEX: 39.37 KG/M2

## 2023-03-13 DIAGNOSIS — I10 ESSENTIAL HYPERTENSION: ICD-10-CM

## 2023-03-13 DIAGNOSIS — K21.9 GASTROESOPHAGEAL REFLUX DISEASE: ICD-10-CM

## 2023-03-13 DIAGNOSIS — Z12.11 SCREENING FOR COLON CANCER: Primary | ICD-10-CM

## 2023-03-13 DIAGNOSIS — N31.8 BLADDER HYPERTONICITY: ICD-10-CM

## 2023-03-13 DIAGNOSIS — F32.0 CURRENT MILD EPISODE OF MAJOR DEPRESSIVE DISORDER, UNSPECIFIED WHETHER RECURRENT (HCC): ICD-10-CM

## 2023-03-13 DIAGNOSIS — I69.354 HEMIPARESIS AFFECTING LEFT SIDE AS LATE EFFECT OF CEREBROVASCULAR ACCIDENT (CVA) (HCC): ICD-10-CM

## 2023-03-13 DIAGNOSIS — I63.341 CEREBRAL INFARCTION DUE TO THROMBOSIS OF RIGHT CEREBELLAR ARTERY (HCC): ICD-10-CM

## 2023-03-13 PROCEDURE — 99214 OFFICE O/P EST MOD 30 MIN: CPT | Performed by: FAMILY MEDICINE

## 2023-03-13 RX ORDER — RANOLAZINE 1000 MG/1
1000 TABLET, EXTENDED RELEASE ORAL EVERY 12 HOURS
Qty: 180 TABLET | Refills: 1 | Status: SHIPPED | OUTPATIENT
Start: 2023-03-13

## 2023-03-13 RX ORDER — EVOLOCUMAB 140 MG/ML
INJECTION, SOLUTION SUBCUTANEOUS
Qty: 6 PEN | Refills: 1 | Status: SHIPPED | OUTPATIENT
Start: 2023-03-13

## 2023-03-13 RX ORDER — ISOSORBIDE MONONITRATE 60 MG/1
60 TABLET, EXTENDED RELEASE ORAL
Qty: 90 TABLET | Refills: 1 | Status: SHIPPED | OUTPATIENT
Start: 2023-03-13

## 2023-03-13 RX ORDER — METOPROLOL TARTRATE 25 MG/1
12.5 TABLET, FILM COATED ORAL 2 TIMES DAILY
Qty: 90 TABLET | Refills: 1 | Status: SHIPPED | OUTPATIENT
Start: 2023-03-13

## 2023-03-13 RX ORDER — CITALOPRAM 20 MG/1
20 TABLET, FILM COATED ORAL DAILY
Qty: 90 TABLET | Refills: 1 | Status: SHIPPED | OUTPATIENT
Start: 2023-03-13

## 2023-03-13 RX ORDER — PANTOPRAZOLE SODIUM 40 MG/1
40 TABLET, DELAYED RELEASE ORAL DAILY
Qty: 90 TABLET | Refills: 1 | Status: SHIPPED | OUTPATIENT
Start: 2023-03-13

## 2023-03-13 RX ORDER — CLOPIDOGREL BISULFATE 75 MG/1
75 TABLET ORAL DAILY
Qty: 90 TABLET | Refills: 1 | Status: SHIPPED | OUTPATIENT
Start: 2023-03-13

## 2023-03-13 RX ORDER — TOPIRAMATE 50 MG/1
50 TABLET, FILM COATED ORAL 2 TIMES DAILY
Qty: 180 TABLET | Refills: 1 | Status: SHIPPED | OUTPATIENT
Start: 2023-03-13

## 2023-03-15 ENCOUNTER — DOCUMENTATION ONLY (OUTPATIENT)
Dept: FAMILY MEDICINE CLINIC | Age: 53
End: 2023-03-15

## 2023-03-15 NOTE — PROGRESS NOTES
Layne CAMILO Case ID: [id number] - Rx #: [id number]  Outcome  Approved today. [address] called approval,Franc replied it went thru will get it ready for him.   PA Case: [id number], Status: Approved, Coverage Starts on: 3/15/2023 12:00:00 AM, Coverage Ends on: 3/14/2024 12:00:00 AM.  Drug  Myrbetriq 50MG er tablets  Form  [employer] Electronic PA Form (5614 NCPDP)

## 2023-05-18 RX ORDER — TOPIRAMATE 50 MG/1
50 TABLET, FILM COATED ORAL 2 TIMES DAILY
COMMUNITY
Start: 2023-03-13

## 2023-05-18 RX ORDER — RANOLAZINE 1000 MG/1
1000 TABLET, EXTENDED RELEASE ORAL EVERY 12 HOURS
COMMUNITY
Start: 2023-03-13

## 2023-05-18 RX ORDER — ASPIRIN 81 MG/1
81 TABLET ORAL DAILY
COMMUNITY
Start: 2017-05-09

## 2023-05-18 RX ORDER — ISOSORBIDE MONONITRATE 60 MG/1
1 TABLET, EXTENDED RELEASE ORAL EVERY MORNING
COMMUNITY
Start: 2023-03-13

## 2023-05-18 RX ORDER — FREMANEZUMAB-VFRM 225 MG/1.5ML
1.5 INJECTION SUBCUTANEOUS
COMMUNITY
Start: 2021-07-02

## 2023-05-18 RX ORDER — GABAPENTIN 300 MG/1
300 CAPSULE ORAL 2 TIMES DAILY
COMMUNITY

## 2023-05-18 RX ORDER — EVOLOCUMAB 140 MG/ML
INJECTION, SOLUTION SUBCUTANEOUS
COMMUNITY
Start: 2023-03-13

## 2023-05-18 RX ORDER — CITALOPRAM 20 MG/1
20 TABLET ORAL DAILY
COMMUNITY
Start: 2023-03-13

## 2023-05-18 RX ORDER — FEXOFENADINE HCL 180 MG/1
TABLET ORAL DAILY
COMMUNITY

## 2023-05-18 RX ORDER — PANTOPRAZOLE SODIUM 40 MG/1
40 TABLET, DELAYED RELEASE ORAL DAILY
COMMUNITY
Start: 2023-03-13

## 2023-05-18 RX ORDER — CLOPIDOGREL BISULFATE 75 MG/1
75 TABLET ORAL DAILY
COMMUNITY
Start: 2023-03-13

## 2023-07-06 ENCOUNTER — TELEPHONE (OUTPATIENT)
Age: 53
End: 2023-07-06

## 2023-07-06 NOTE — TELEPHONE ENCOUNTER
Plan called at [phone] PA attempted on,Ranolazine ER 1000MG er tablets. PA rep Gaby Engle stated no PA needed,too soon refill. Last filled 6/19/23,next refill date is,7/16/23. Please note. Thanks

## 2023-07-11 ENCOUNTER — TELEPHONE (OUTPATIENT)
Age: 53
End: 2023-07-11

## 2023-07-11 NOTE — TELEPHONE ENCOUNTER
Plan called at [phone] PA completed with [employer],for Ranolazine 1000 mg er tablets. PA # [id number]. Claim pending clinical review. Outcome will be faxed to office with letter mailed to patient. Please follow up as needed,when fax is received in office.

## 2023-07-12 NOTE — TELEPHONE ENCOUNTER
PA request for Ranexa has been denied - this medication can be approved for a diagnosis of chronic angina, please assess if another medication would be more beneficial  Winston Khan LPN 9/14/1841 2:29 PM

## 2023-07-31 ENCOUNTER — TELEPHONE (OUTPATIENT)
Facility: CLINIC | Age: 53
End: 2023-07-31

## 2023-08-15 RX ORDER — CITALOPRAM 20 MG/1
20 TABLET ORAL DAILY
Qty: 30 TABLET | Refills: 5 | Status: SHIPPED | OUTPATIENT
Start: 2023-08-15

## 2023-08-17 ENCOUNTER — TELEPHONE (OUTPATIENT)
Facility: CLINIC | Age: 53
End: 2023-08-17

## 2023-08-24 ENCOUNTER — TELEPHONE (OUTPATIENT)
Age: 53
End: 2023-08-24

## 2023-08-24 NOTE — TELEPHONE ENCOUNTER
Key: [id number] - PA Case ID: [id number]  Rx #: [id number] Outcome  Denied on August 23,Please follow up with documents to support claim. Plan called at [phone],[address] ,stated claim was denied. Clinical documentation may be faxed to [phone],[facility] reconsideration. Please include PA Case ID: [id number]. Please fax as soon as possible. Thanks   Drug  Ranolazine ER 1000MG er tablets  Form  [employer] Electronic PA Form (0586 NCPDP)

## 2023-08-29 RX ORDER — RANOLAZINE 1000 MG/1
1000 TABLET, EXTENDED RELEASE ORAL EVERY 12 HOURS
Qty: 180 TABLET | Refills: 1 | Status: SHIPPED | OUTPATIENT
Start: 2023-08-29

## 2023-09-11 RX ORDER — CITALOPRAM 20 MG/1
20 TABLET ORAL DAILY
Qty: 90 TABLET | Refills: 0 | Status: SHIPPED | OUTPATIENT
Start: 2023-09-11

## 2023-09-14 ENCOUNTER — TELEPHONE (OUTPATIENT)
Facility: CLINIC | Age: 53
End: 2023-09-14

## 2023-10-04 ENCOUNTER — HOSPITAL ENCOUNTER (OUTPATIENT)
Facility: HOSPITAL | Age: 53
Discharge: HOME | End: 2023-10-05
Attending: INTERNAL MEDICINE | Admitting: INTERNAL MEDICINE
Payer: MEDICAID

## 2023-10-04 DIAGNOSIS — R94.39 ABNORMAL STRESS TEST: Primary | ICD-10-CM

## 2023-10-04 DIAGNOSIS — I25.112 ATHEROSCLEROSIS OF NATIVE CORONARY ARTERY OF NATIVE HEART WITH REFRACTORY ANGINA PECTORIS (HCC): ICD-10-CM

## 2023-10-04 DIAGNOSIS — I25.10 CORONARY ATHEROSCLEROSIS OF NATIVE CORONARY ARTERY: ICD-10-CM

## 2023-10-04 LAB
ACT BLD: 143 SECS (ref 79–138)
ACT BLD: 161 SECS (ref 79–138)
ACT BLD: 173 SECS (ref 79–138)
ACT BLD: 257 SECS (ref 79–138)
ECHO BSA: 2.4 M2

## 2023-10-04 PROCEDURE — 6360000002 HC RX W HCPCS: Performed by: INTERNAL MEDICINE

## 2023-10-04 PROCEDURE — C9600 PERC DRUG-EL COR STENT SING: HCPCS | Performed by: INTERNAL MEDICINE

## 2023-10-04 PROCEDURE — A4216 STERILE WATER/SALINE, 10 ML: HCPCS | Performed by: INTERNAL MEDICINE

## 2023-10-04 PROCEDURE — C1874 STENT, COATED/COV W/DEL SYS: HCPCS | Performed by: INTERNAL MEDICINE

## 2023-10-04 PROCEDURE — 6370000000 HC RX 637 (ALT 250 FOR IP): Performed by: INTERNAL MEDICINE

## 2023-10-04 PROCEDURE — 2580000003 HC RX 258: Performed by: INTERNAL MEDICINE

## 2023-10-04 PROCEDURE — C1769 GUIDE WIRE: HCPCS | Performed by: INTERNAL MEDICINE

## 2023-10-04 PROCEDURE — 85347 COAGULATION TIME ACTIVATED: CPT

## 2023-10-04 PROCEDURE — 99153 MOD SED SAME PHYS/QHP EA: CPT | Performed by: INTERNAL MEDICINE

## 2023-10-04 PROCEDURE — C9113 INJ PANTOPRAZOLE SODIUM, VIA: HCPCS | Performed by: INTERNAL MEDICINE

## 2023-10-04 PROCEDURE — C1894 INTRO/SHEATH, NON-LASER: HCPCS | Performed by: INTERNAL MEDICINE

## 2023-10-04 PROCEDURE — 2709999900 HC NON-CHARGEABLE SUPPLY: Performed by: INTERNAL MEDICINE

## 2023-10-04 PROCEDURE — 93458 L HRT ARTERY/VENTRICLE ANGIO: CPT | Performed by: INTERNAL MEDICINE

## 2023-10-04 PROCEDURE — C1725 CATH, TRANSLUMIN NON-LASER: HCPCS | Performed by: INTERNAL MEDICINE

## 2023-10-04 PROCEDURE — 99152 MOD SED SAME PHYS/QHP 5/>YRS: CPT | Performed by: INTERNAL MEDICINE

## 2023-10-04 PROCEDURE — C1887 CATHETER, GUIDING: HCPCS | Performed by: INTERNAL MEDICINE

## 2023-10-04 PROCEDURE — 6360000004 HC RX CONTRAST MEDICATION: Performed by: INTERNAL MEDICINE

## 2023-10-04 PROCEDURE — 2500000003 HC RX 250 WO HCPCS: Performed by: INTERNAL MEDICINE

## 2023-10-04 DEVICE — STENT CORONARY ONYX FRONTIER RX 2X12 MM ZOTAROLIMUS ELUT: Type: IMPLANTABLE DEVICE | Status: FUNCTIONAL

## 2023-10-04 RX ORDER — RANOLAZINE 500 MG/1
1000 TABLET, EXTENDED RELEASE ORAL EVERY 12 HOURS
Status: DISCONTINUED | OUTPATIENT
Start: 2023-10-04 | End: 2023-10-05 | Stop reason: HOSPADM

## 2023-10-04 RX ORDER — FENTANYL CITRATE 50 UG/ML
INJECTION, SOLUTION INTRAMUSCULAR; INTRAVENOUS PRN
Status: DISCONTINUED | OUTPATIENT
Start: 2023-10-04 | End: 2023-10-04 | Stop reason: HOSPADM

## 2023-10-04 RX ORDER — HEPARIN SODIUM 10000 [USP'U]/ML
INJECTION, SOLUTION INTRAVENOUS; SUBCUTANEOUS PRN
Status: DISCONTINUED | OUTPATIENT
Start: 2023-10-04 | End: 2023-10-04 | Stop reason: HOSPADM

## 2023-10-04 RX ORDER — ISOSORBIDE MONONITRATE 30 MG/1
60 TABLET, EXTENDED RELEASE ORAL EVERY MORNING
Status: DISCONTINUED | OUTPATIENT
Start: 2023-10-04 | End: 2023-10-05 | Stop reason: HOSPADM

## 2023-10-04 RX ORDER — CITALOPRAM 20 MG/1
20 TABLET ORAL DAILY
Status: DISCONTINUED | OUTPATIENT
Start: 2023-10-04 | End: 2023-10-05 | Stop reason: HOSPADM

## 2023-10-04 RX ORDER — ATROPINE SULFATE 0.1 MG/ML
INJECTION INTRAVENOUS
Status: DISPENSED
Start: 2023-10-04 | End: 2023-10-05

## 2023-10-04 RX ORDER — ASPIRIN 81 MG/1
TABLET, CHEWABLE ORAL PRN
Status: DISCONTINUED | OUTPATIENT
Start: 2023-10-04 | End: 2023-10-04 | Stop reason: HOSPADM

## 2023-10-04 RX ORDER — PANTOPRAZOLE SODIUM 40 MG/1
40 TABLET, DELAYED RELEASE ORAL DAILY
Status: DISCONTINUED | OUTPATIENT
Start: 2023-10-04 | End: 2023-10-05 | Stop reason: HOSPADM

## 2023-10-04 RX ORDER — GABAPENTIN 300 MG/1
300 CAPSULE ORAL 2 TIMES DAILY
Status: DISCONTINUED | OUTPATIENT
Start: 2023-10-04 | End: 2023-10-05 | Stop reason: HOSPADM

## 2023-10-04 RX ORDER — M-VIT,TX,IRON,MINS/CALC/FOLIC 27MG-0.4MG
1 TABLET ORAL DAILY
COMMUNITY

## 2023-10-04 RX ORDER — ACETAMINOPHEN 325 MG/1
650 TABLET ORAL EVERY 4 HOURS PRN
Status: DISCONTINUED | OUTPATIENT
Start: 2023-10-04 | End: 2023-10-05 | Stop reason: HOSPADM

## 2023-10-04 RX ORDER — TROSPIUM CHLORIDE 20 MG/1
20 TABLET, FILM COATED ORAL
Status: DISCONTINUED | OUTPATIENT
Start: 2023-10-04 | End: 2023-10-05 | Stop reason: HOSPADM

## 2023-10-04 RX ORDER — CHLORAL HYDRATE 500 MG
CAPSULE ORAL DAILY
COMMUNITY

## 2023-10-04 RX ORDER — SODIUM CHLORIDE 9 MG/ML
INJECTION, SOLUTION INTRAVENOUS CONTINUOUS
Status: ACTIVE | OUTPATIENT
Start: 2023-10-04 | End: 2023-10-05

## 2023-10-04 RX ORDER — HEPARIN SODIUM 1000 [USP'U]/ML
INJECTION, SOLUTION INTRAVENOUS; SUBCUTANEOUS PRN
Status: DISCONTINUED | OUTPATIENT
Start: 2023-10-04 | End: 2023-10-04 | Stop reason: HOSPADM

## 2023-10-04 RX ORDER — TOPIRAMATE 25 MG/1
50 TABLET ORAL 2 TIMES DAILY
Status: DISCONTINUED | OUTPATIENT
Start: 2023-10-04 | End: 2023-10-05 | Stop reason: HOSPADM

## 2023-10-04 RX ORDER — ASPIRIN 81 MG/1
81 TABLET ORAL DAILY
Status: DISCONTINUED | OUTPATIENT
Start: 2023-10-04 | End: 2023-10-05 | Stop reason: HOSPADM

## 2023-10-04 RX ORDER — CLOPIDOGREL 300 MG/1
TABLET, FILM COATED ORAL PRN
Status: DISCONTINUED | OUTPATIENT
Start: 2023-10-04 | End: 2023-10-04 | Stop reason: HOSPADM

## 2023-10-04 RX ORDER — LIDOCAINE HYDROCHLORIDE 10 MG/ML
INJECTION, SOLUTION INFILTRATION; PERINEURAL PRN
Status: DISCONTINUED | OUTPATIENT
Start: 2023-10-04 | End: 2023-10-04 | Stop reason: HOSPADM

## 2023-10-04 RX ORDER — IODIXANOL 320 MG/ML
INJECTION, SOLUTION INTRAVASCULAR PRN
Status: DISCONTINUED | OUTPATIENT
Start: 2023-10-04 | End: 2023-10-04 | Stop reason: HOSPADM

## 2023-10-04 RX ORDER — CLOPIDOGREL BISULFATE 75 MG/1
75 TABLET ORAL DAILY
Status: DISCONTINUED | OUTPATIENT
Start: 2023-10-04 | End: 2023-10-05 | Stop reason: HOSPADM

## 2023-10-04 RX ORDER — DIPHENHYDRAMINE HYDROCHLORIDE 50 MG/ML
INJECTION INTRAMUSCULAR; INTRAVENOUS PRN
Status: DISCONTINUED | OUTPATIENT
Start: 2023-10-04 | End: 2023-10-04 | Stop reason: HOSPADM

## 2023-10-04 RX ADMIN — TOPIRAMATE 50 MG: 25 TABLET, FILM COATED ORAL at 21:39

## 2023-10-04 RX ADMIN — GABAPENTIN 300 MG: 300 CAPSULE ORAL at 21:39

## 2023-10-04 RX ADMIN — METOPROLOL TARTRATE 12.5 MG: 25 TABLET, FILM COATED ORAL at 21:39

## 2023-10-04 RX ADMIN — SODIUM CHLORIDE 40 MG: 9 INJECTION INTRAMUSCULAR; INTRAVENOUS; SUBCUTANEOUS at 10:58

## 2023-10-04 RX ADMIN — RANOLAZINE 1000 MG: 500 TABLET, EXTENDED RELEASE ORAL at 21:39

## 2023-10-04 RX ADMIN — ACETAMINOPHEN 650 MG: 325 TABLET ORAL at 15:13

## 2023-10-04 NOTE — PROGRESS NOTES
SHEATH PULL NOTE:    Patient informed of procedure with questions answered with review. Sheath site prepped with Chloraprep swab. 6 fr sheath in RFA pulled by VOLODYMYR bolaños. Hand hold and quick clot with manual compression to site. No bleeding, no hematoma, no pain at site. Hemostasis obtained with hand hold/manual compression at site. Patient tolerated well. No change in status. Handhold for 15 minutes. No change at site. sterile dressing applied to site. No bleeding, no hematoma, no pain/discomfort at site. Groin instructions provided with review. Continue to monitor procedure site and patient status. *Advised patient to keep head flat and extremity flat to decrease risk of bleeding. *Recommended that patient not drink for ONE HOUR post sheath pull completion. *Recommended that patient not eat for TWO HOURS post sheath pull completion. *Instructed patient on rationale for delay of PO products to decrease risk for aspiration and if additional treatment to procedure site is required. Patient verbalized understanding of instructions with review.

## 2023-10-04 NOTE — H&P
I discussed the risks/benefits/alternatives of the procedure with the patient. Risks include (but are not limited to) bleeding, infection, cva/mi/tamponade/death. The patient understands and agrees to proceed.  H&P reviewed  Patient examined  No changes have occurred

## 2023-10-04 NOTE — PROGRESS NOTES
Dual Skin preformed with VOLODYMYR beckwith  Cardiac Cath Lab Recovery Arrival Note:      Annabel Brunson arrived to Cardiac Cath Lab, Recovery Area. Staff introduced to patient. Patient identifiers verified with NAME and DATE OF BIRTH. Procedure verified with patient. Consent forms reviewed and signed by patient or authorized representative and verified. Allergies verified. Patient and family oriented to department. Patient and family informed of procedure and plan of care. Questions answered with review. Patient prepped for procedure, per orders from physician, prior to arrival.    Patient on cardiac monitor, non-invasive blood pressure, SPO2 monitor. On RA. Patient is A&Ox 4. Patient reports no pain. Patient in stretcher, in low position, with side rails up, call bell within reach, patient instructed to call if assistance as needed. Patient prep in: [facility] 3. Patient family has pager #   Family in: [place].    Prep by: Henrietta Campbell and Clarice Gomezsins

## 2023-10-05 VITALS
HEART RATE: 91 BPM | WEIGHT: 260 LBS | DIASTOLIC BLOOD PRESSURE: 75 MMHG | HEIGHT: 69 IN | SYSTOLIC BLOOD PRESSURE: 125 MMHG | BODY MASS INDEX: 38.51 KG/M2 | TEMPERATURE: 97.8 F | OXYGEN SATURATION: 95 % | RESPIRATION RATE: 20 BRPM

## 2023-10-05 DIAGNOSIS — Z95.5 STENTED CORONARY ARTERY: Primary | ICD-10-CM

## 2023-10-05 LAB — TROPONIN I SERPL HS-MCNC: 26 NG/L (ref 0–76)

## 2023-10-05 PROCEDURE — 6370000000 HC RX 637 (ALT 250 FOR IP): Performed by: STUDENT IN AN ORGANIZED HEALTH CARE EDUCATION/TRAINING PROGRAM

## 2023-10-05 PROCEDURE — 36415 COLL VENOUS BLD VENIPUNCTURE: CPT

## 2023-10-05 PROCEDURE — 84484 ASSAY OF TROPONIN QUANT: CPT

## 2023-10-05 PROCEDURE — 6370000000 HC RX 637 (ALT 250 FOR IP): Performed by: INTERNAL MEDICINE

## 2023-10-05 RX ORDER — LIDOCAINE 4 G/G
1 PATCH TOPICAL DAILY
Status: DISCONTINUED | OUTPATIENT
Start: 2023-10-05 | End: 2023-10-05 | Stop reason: HOSPADM

## 2023-10-05 RX ORDER — NITROGLYCERIN 0.4 MG/1
0.4 TABLET SUBLINGUAL EVERY 5 MIN PRN
Status: DISCONTINUED | OUTPATIENT
Start: 2023-10-05 | End: 2023-10-05 | Stop reason: HOSPADM

## 2023-10-05 RX ORDER — ISOSORBIDE MONONITRATE 120 MG/1
120 TABLET, EXTENDED RELEASE ORAL EVERY MORNING
Qty: 90 TABLET | Refills: 3 | Status: SHIPPED | OUTPATIENT
Start: 2023-10-05

## 2023-10-05 RX ADMIN — NITROGLYCERIN 0.4 MG: 0.4 TABLET, ORALLY DISINTEGRATING SUBLINGUAL at 00:40

## 2023-10-05 RX ADMIN — NITROGLYCERIN 0.4 MG: 0.4 TABLET, ORALLY DISINTEGRATING SUBLINGUAL at 01:43

## 2023-10-05 RX ADMIN — NITROGLYCERIN 0.4 MG: 0.4 TABLET, ORALLY DISINTEGRATING SUBLINGUAL at 01:12

## 2023-10-05 RX ADMIN — TOPIRAMATE 50 MG: 25 TABLET, FILM COATED ORAL at 09:00

## 2023-10-05 RX ADMIN — ASPIRIN 81 MG: 81 TABLET, COATED ORAL at 09:01

## 2023-10-05 RX ADMIN — METOPROLOL TARTRATE 12.5 MG: 25 TABLET, FILM COATED ORAL at 09:00

## 2023-10-05 RX ADMIN — ISOSORBIDE MONONITRATE 60 MG: 30 TABLET, EXTENDED RELEASE ORAL at 09:00

## 2023-10-05 RX ADMIN — CLOPIDOGREL BISULFATE 75 MG: 75 TABLET ORAL at 09:00

## 2023-10-05 RX ADMIN — CITALOPRAM 20 MG: 20 TABLET, FILM COATED ORAL at 09:01

## 2023-10-05 RX ADMIN — PANTOPRAZOLE SODIUM 40 MG: 40 TABLET, DELAYED RELEASE ORAL at 09:08

## 2023-10-05 RX ADMIN — RANOLAZINE 1000 MG: 500 TABLET, EXTENDED RELEASE ORAL at 09:01

## 2023-10-05 RX ADMIN — TROSPIUM CHLORIDE 20 MG: 20 TABLET, FILM COATED ORAL at 06:30

## 2023-10-05 RX ADMIN — GABAPENTIN 300 MG: 300 CAPSULE ORAL at 09:01

## 2023-10-05 ASSESSMENT — PAIN DESCRIPTION - ORIENTATION
ORIENTATION: MID
ORIENTATION: MID

## 2023-10-05 ASSESSMENT — PAIN DESCRIPTION - LOCATION
LOCATION: STERNUM
LOCATION: STERNUM

## 2023-10-05 ASSESSMENT — PAIN DESCRIPTION - DESCRIPTORS
DESCRIPTORS: BURNING
DESCRIPTORS: BURNING

## 2023-10-05 ASSESSMENT — PAIN SCALES - GENERAL
PAINLEVEL_OUTOF10: 1
PAINLEVEL_OUTOF10: 5

## 2023-10-05 NOTE — PROGRESS NOTES
Patient complaining of persistent \"burning\" in chest region. EKG completed. Cardiologist notified. Awaiting response.

## 2023-10-05 NOTE — PROGRESS NOTES
I have reviewed discharged instructions with the patient and patient's wife such as medications changes, follow up appointments, and post cath site care instructions.

## 2023-10-05 NOTE — CARDIO/PULMONARY
Chart reviewed: Patient is 45 y.o. male admitted with Coronary atherosclerosis of native coronary artery [I25.10]    Education: CAD education folder given to [facility]. Educated using teach back method. Reviewed CAD diagnosis definition and purpose of intervention. Discussed risk factors for CAD to include the following: family history, elevated BMI, hyperlipidemia, hypertension, diabetes, stress, and smoking. Smoking Cessation Program link added to AVS. Discussed Heart Healthy/Low Sodium (2000 mg) diet. Reviewed the importance of medication compliance. Discussed follow up appointments with cardiologist, signs and symptoms of angina, and what to report to physician after discharge. Emphasized the value of cardiac rehab. Discussed Cardiac Rehab Program format, benefits, and encouraged enrollment to assist with risk modification and management. Patient lives in [place], [place]. Patient has residual affects from stroke but stated he is interested in virtual program and has his wife to assist him. Contact wife Latoya Solis at [phone] to discuss. [facility] verbalized understanding with questions answered.      Beny Julian RN

## 2023-10-05 NOTE — PLAN OF CARE
Problem: Chronic Conditions and Co-morbidities  Goal: Patient's chronic conditions and co-morbidity symptoms are monitored and maintained or improved  Outcome: [facility] Progressing  Flowsheets (Taken 10/5/2023 0800)  Care Plan - Patient's Chronic Conditions and Co-Morbidity Symptoms are Monitored and Maintained or Improved: Monitor and assess patient's chronic conditions and comorbid symptoms for stability, deterioration, or improvement     Problem: Safety - Adult  Goal: Free from fall injury  Outcome: [facility] Progressing     Problem: Discharge Planning  Goal: Discharge to home or other facility with appropriate resources  Outcome: [facility] Progressing  Flowsheets (Taken 10/5/2023 0800)  Discharge to home or other facility with appropriate resources: Identify barriers to discharge with patient and caregiver     Problem: ABCDS Injury Assessment  Goal: Absence of physical injury  Outcome: [facility] Progressing     Problem: Pain  Goal: Verbalizes/displays adequate comfort level or baseline comfort level  Outcome: /[facility] Progressing  Flowsheets (Taken 10/5/2023 0800)  Verbalizes/displays adequate comfort level or baseline comfort level: Encourage patient to monitor pain and request assistance     Problem: Skin/Tissue Integrity  Goal: Absence of new skin breakdown  Description: 1. Monitor for areas of redness and/or skin breakdown  2. Assess vascular access sites hourly  3. Every 4-6 hours minimum:  Change oxygen saturation probe site  4. Every 4-6 hours:  If on nasal continuous positive airway pressure, respiratory therapy assess nares and determine need for appliance change or resting period.   Outcome: [facility] Progressing

## 2023-11-17 RX ORDER — TOPIRAMATE 50 MG/1
50 TABLET, FILM COATED ORAL 2 TIMES DAILY
Qty: 180 TABLET | Refills: 0 | Status: SHIPPED | OUTPATIENT
Start: 2023-11-17

## 2023-11-30 ENCOUNTER — OFFICE VISIT (OUTPATIENT)
Facility: CLINIC | Age: 53
End: 2023-11-30
Payer: MEDICAID

## 2023-11-30 VITALS
WEIGHT: 277 LBS | HEART RATE: 70 BPM | RESPIRATION RATE: 20 BRPM | BODY MASS INDEX: 41.03 KG/M2 | OXYGEN SATURATION: 96 % | HEIGHT: 69 IN | TEMPERATURE: 97.2 F | DIASTOLIC BLOOD PRESSURE: 72 MMHG | SYSTOLIC BLOOD PRESSURE: 114 MMHG

## 2023-11-30 DIAGNOSIS — E66.01 SEVERE OBESITY (BMI 35.0-39.9) WITH COMORBIDITY (HCC): ICD-10-CM

## 2023-11-30 DIAGNOSIS — G44.019 EPISODIC CLUSTER HEADACHE, NOT INTRACTABLE: ICD-10-CM

## 2023-11-30 DIAGNOSIS — Z12.11 SCREENING FOR COLON CANCER: ICD-10-CM

## 2023-11-30 DIAGNOSIS — I25.10 CORONARY ARTERY DISEASE INVOLVING NATIVE CORONARY ARTERY OF NATIVE HEART WITHOUT ANGINA PECTORIS: ICD-10-CM

## 2023-11-30 DIAGNOSIS — F33.1 DEPRESSION, MAJOR, RECURRENT, MODERATE (HCC): ICD-10-CM

## 2023-11-30 DIAGNOSIS — I10 ESSENTIAL HYPERTENSION: ICD-10-CM

## 2023-11-30 DIAGNOSIS — I69.354 HEMIPARESIS AFFECTING LEFT SIDE AS LATE EFFECT OF CEREBROVASCULAR ACCIDENT (CVA) (HCC): Primary | ICD-10-CM

## 2023-11-30 DIAGNOSIS — E78.2 MIXED HYPERLIPIDEMIA: ICD-10-CM

## 2023-11-30 DIAGNOSIS — G57.12 MERALGIA PARESTHETICA OF LEFT SIDE: ICD-10-CM

## 2023-11-30 DIAGNOSIS — I63.341 CEREBRAL INFARCTION DUE TO THROMBOSIS OF RIGHT CEREBELLAR ARTERY (HCC): ICD-10-CM

## 2023-11-30 PROCEDURE — 3074F SYST BP LT 130 MM HG: CPT | Performed by: FAMILY MEDICINE

## 2023-11-30 PROCEDURE — 99214 OFFICE O/P EST MOD 30 MIN: CPT | Performed by: FAMILY MEDICINE

## 2023-11-30 PROCEDURE — 3078F DIAST BP <80 MM HG: CPT | Performed by: FAMILY MEDICINE

## 2023-11-30 RX ORDER — CLOPIDOGREL BISULFATE 75 MG/1
75 TABLET ORAL DAILY
Qty: 90 TABLET | Refills: 1 | Status: SHIPPED | OUTPATIENT
Start: 2023-11-30

## 2023-11-30 RX ORDER — CITALOPRAM 40 MG/1
40 TABLET ORAL DAILY
Qty: 90 TABLET | Refills: 1 | Status: SHIPPED | OUTPATIENT
Start: 2023-11-30

## 2023-11-30 RX ORDER — TOPIRAMATE 50 MG/1
50 TABLET, FILM COATED ORAL 2 TIMES DAILY
Qty: 180 TABLET | Refills: 1 | Status: SHIPPED | OUTPATIENT
Start: 2023-11-30

## 2023-11-30 RX ORDER — PANTOPRAZOLE SODIUM 40 MG/1
40 TABLET, DELAYED RELEASE ORAL DAILY
Qty: 90 TABLET | Refills: 1 | Status: SHIPPED | OUTPATIENT
Start: 2023-11-30

## 2023-11-30 RX ORDER — RANOLAZINE 1000 MG/1
1000 TABLET, EXTENDED RELEASE ORAL EVERY 12 HOURS
Qty: 180 TABLET | Refills: 1 | Status: SHIPPED | OUTPATIENT
Start: 2023-11-30

## 2023-11-30 SDOH — ECONOMIC STABILITY: HOUSING INSECURITY
IN THE LAST 12 MONTHS, WAS THERE A TIME WHEN YOU DID NOT HAVE A STEADY PLACE TO SLEEP OR SLEPT IN A SHELTER (INCLUDING NOW)?: NO

## 2023-11-30 SDOH — ECONOMIC STABILITY: TRANSPORTATION INSECURITY
IN THE PAST 12 MONTHS, HAS LACK OF TRANSPORTATION KEPT YOU FROM MEETINGS, WORK, OR FROM GETTING THINGS NEEDED FOR DAILY LIVING?: NO

## 2023-11-30 SDOH — ECONOMIC STABILITY: FOOD INSECURITY: WITHIN THE PAST 12 MONTHS, THE FOOD YOU BOUGHT JUST DIDN'T LAST AND YOU DIDN'T HAVE MONEY TO GET MORE.: SOMETIMES TRUE

## 2023-11-30 SDOH — ECONOMIC STABILITY: FOOD INSECURITY: WITHIN THE PAST 12 MONTHS, YOU WORRIED THAT YOUR FOOD WOULD RUN OUT BEFORE YOU GOT MONEY TO BUY MORE.: NEVER TRUE

## 2023-11-30 SDOH — ECONOMIC STABILITY: INCOME INSECURITY: HOW HARD IS IT FOR YOU TO PAY FOR THE VERY BASICS LIKE FOOD, HOUSING, MEDICAL CARE, AND HEATING?: SOMEWHAT HARD

## 2023-11-30 ASSESSMENT — PATIENT HEALTH QUESTIONNAIRE - PHQ9
2. FEELING DOWN, DEPRESSED OR HOPELESS: 1
1. LITTLE INTEREST OR PLEASURE IN DOING THINGS: 1
SUM OF ALL RESPONSES TO PHQ QUESTIONS 1-9: 2
SUM OF ALL RESPONSES TO PHQ9 QUESTIONS 1 & 2: 2
SUM OF ALL RESPONSES TO PHQ QUESTIONS 1-9: 2
SUM OF ALL RESPONSES TO PHQ9 QUESTIONS 1 & 2: 2
2. FEELING DOWN, DEPRESSED OR HOPELESS: 1
SUM OF ALL RESPONSES TO PHQ QUESTIONS 1-9: 2
1. LITTLE INTEREST OR PLEASURE IN DOING THINGS: 1

## 2023-11-30 NOTE — PROGRESS NOTES
Chief Complaint   Patient presents with    Blood Pressure Check     3 month follow up
tablet Take 1 tablet by mouth in the morning and 1 tablet in the evening. 180 tablet 1    topiramate (TOPAMAX) 50 MG tablet Take 1 tablet by mouth 2 times daily 180 tablet 1    clopidogrel (PLAVIX) 75 MG tablet Take 1 tablet by mouth daily 90 tablet 1    metoprolol tartrate (LOPRESSOR) 25 MG tablet Take 0.5 tablets by mouth daily 45 tablet 1    mirabegron (MYRBETRIQ) 50 MG TB24 Take 50 mg by mouth daily 90 tablet 1    pantoprazole (PROTONIX) 40 MG tablet Take 1 tablet by mouth daily 90 tablet 1    isosorbide mononitrate (IMDUR) 120 MG extended release tablet Take 1 tablet by mouth every morning 90 tablet 3    Multiple Vitamins-Minerals (THERAPEUTIC MULTIVITAMIN-MINERALS) tablet Take 1 tablet by mouth daily      Omega-3 Fatty Acids (FISH OIL) 1000 MG capsule Take by mouth daily      aspirin 81 MG EC tablet Take 1 tablet by mouth daily      Evolocumab (REPATHA SURECLICK) 702 MG/ML SOAJ Inject 140 mg under the skin every 2 weeks      fexofenadine (ALLEGRA) 180 MG tablet Take by mouth daily      Fremanezumab-vfrm (AJOVY) 225 MG/1.5ML SOAJ Inject 1.5 mLs into the skin      gabapentin (NEURONTIN) 300 MG capsule Take 1 capsule by mouth 3 times daily. No current facility-administered medications for this visit.      3 mo

## 2024-02-15 ENCOUNTER — TELEPHONE (OUTPATIENT)
Age: 54
End: 2024-02-15

## 2024-02-15 NOTE — TELEPHONE ENCOUNTER
(Key: [id number])  New (Not sent to plan) Plan called CC Rep Suzette FIGUEROA,stated no PA needed script picked up on 2/12/24 with paid claim next refill date is 3/10/24,went thru with paid claim.Please note.  Drug  Myrbetriq 50MG er tablets  ePA cloud logo  Form  [employer] Electronic PA Form

## 2024-03-06 ENCOUNTER — TELEPHONE (OUTPATIENT)
Age: 54
End: 2024-03-06

## 2024-03-06 NOTE — TELEPHONE ENCOUNTER
Approved  PA Detail :pantoprazole (PROTONIX) 40 MG tablet   Prior authorization approved Case ID: [id number]  Payer: [facility] [place] - Managed Medicaid  PA Case: [id number], Status: Approved, Coverage Starts on: 3/6/2024 12:00:00 AM, Coverage Ends on: 3/6/2025 12:00:00 AM.[facility] called approval.FYI;Andria stated it went thru with 0 copy.Advised if letter of approval is fax to pharmacy,no call is necessary

## 2024-04-09 ENCOUNTER — TELEPHONE (OUTPATIENT)
Age: 54
End: 2024-04-09

## 2024-04-09 NOTE — TELEPHONE ENCOUNTER
Approved: Myrbetriq 50 mg TB24  [facility] pharmacy called approval,Tolu stated it can be processed on,4/10/24.   Prior authorization approved Case ID: [id number]      Payer: [facility] [place] - Managed Medicaid PA Case: [id number], Status: Approved, Coverage Starts on: 4/9/2024 12:00:00 AM, Coverage Ends on: 4/9/2025 12:00:00 AM.

## 2024-05-22 RX ORDER — CLOPIDOGREL BISULFATE 75 MG/1
75 TABLET ORAL DAILY
Qty: 90 TABLET | Refills: 0 | Status: SHIPPED | OUTPATIENT
Start: 2024-05-22

## 2024-06-04 RX ORDER — CITALOPRAM 40 MG/1
40 TABLET ORAL DAILY
Qty: 90 TABLET | Refills: 0 | Status: SHIPPED | OUTPATIENT
Start: 2024-06-04

## 2024-06-04 RX ORDER — PANTOPRAZOLE SODIUM 40 MG/1
40 TABLET, DELAYED RELEASE ORAL DAILY
Qty: 90 TABLET | Refills: 0 | Status: SHIPPED | OUTPATIENT
Start: 2024-06-04

## 2024-06-10 RX ORDER — RANOLAZINE 1000 MG/1
1000 TABLET, EXTENDED RELEASE ORAL EVERY 12 HOURS
Qty: 180 TABLET | Refills: 0 | Status: SHIPPED | OUTPATIENT
Start: 2024-06-10

## 2024-06-10 RX ORDER — RANOLAZINE 1000 MG/1
TABLET, EXTENDED RELEASE ORAL
Qty: 180 TABLET | Refills: 0 | OUTPATIENT
Start: 2024-06-10

## 2024-07-01 RX ORDER — MIRABEGRON 50 MG/1
50 TABLET, EXTENDED RELEASE ORAL DAILY
Qty: 90 TABLET | Refills: 1 | Status: SHIPPED | OUTPATIENT
Start: 2024-07-01

## 2024-07-24 RX ORDER — MIRABEGRON 50 MG/1
50 TABLET, EXTENDED RELEASE ORAL DAILY
Qty: 90 TABLET | Refills: 1 | Status: SHIPPED | OUTPATIENT
Start: 2024-07-24

## 2024-08-18 DIAGNOSIS — N32.81 OVERACTIVE BLADDER: Primary | ICD-10-CM

## 2024-08-19 RX ORDER — CLOPIDOGREL BISULFATE 75 MG/1
75 TABLET ORAL DAILY
Qty: 90 TABLET | Refills: 0 | Status: SHIPPED | OUTPATIENT
Start: 2024-08-19

## 2024-08-19 RX ORDER — MIRABEGRON 50 MG/1
50 TABLET, EXTENDED RELEASE ORAL DAILY
Qty: 90 TABLET | Refills: 0 | Status: SHIPPED | OUTPATIENT
Start: 2024-08-19

## 2024-08-19 RX ORDER — TOPIRAMATE 50 MG/1
50 TABLET, FILM COATED ORAL 2 TIMES DAILY
Qty: 180 TABLET | Refills: 0 | Status: SHIPPED | OUTPATIENT
Start: 2024-08-19

## 2024-08-29 ENCOUNTER — OFFICE VISIT (OUTPATIENT)
Facility: CLINIC | Age: 54
End: 2024-08-29

## 2024-08-29 VITALS
HEART RATE: 63 BPM | DIASTOLIC BLOOD PRESSURE: 75 MMHG | HEIGHT: 69 IN | SYSTOLIC BLOOD PRESSURE: 116 MMHG | TEMPERATURE: 97.7 F | BODY MASS INDEX: 40.91 KG/M2

## 2024-08-29 DIAGNOSIS — F33.1 DEPRESSION, MAJOR, RECURRENT, MODERATE (HCC): ICD-10-CM

## 2024-08-29 DIAGNOSIS — Z12.12 ENCOUNTER FOR COLORECTAL CANCER SCREENING USING COLOGUARD TEST: ICD-10-CM

## 2024-08-29 DIAGNOSIS — Z23 NEED FOR VACCINATION: ICD-10-CM

## 2024-08-29 DIAGNOSIS — I69.354 HEMIPARESIS AFFECTING LEFT SIDE AS LATE EFFECT OF CEREBROVASCULAR ACCIDENT (CVA) (HCC): ICD-10-CM

## 2024-08-29 DIAGNOSIS — E78.2 MIXED HYPERLIPIDEMIA: ICD-10-CM

## 2024-08-29 DIAGNOSIS — Z12.11 ENCOUNTER FOR COLORECTAL CANCER SCREENING USING COLOGUARD TEST: ICD-10-CM

## 2024-08-29 DIAGNOSIS — Z12.11 SCREENING FOR COLON CANCER: ICD-10-CM

## 2024-08-29 DIAGNOSIS — K21.9 GASTRO-ESOPHAGEAL REFLUX DISEASE WITHOUT ESOPHAGITIS: ICD-10-CM

## 2024-08-29 DIAGNOSIS — I10 ESSENTIAL HYPERTENSION: Primary | ICD-10-CM

## 2024-08-29 DIAGNOSIS — G44.011 INTRACTABLE EPISODIC CLUSTER HEADACHE: ICD-10-CM

## 2024-08-29 RX ORDER — PANTOPRAZOLE SODIUM 40 MG/1
40 TABLET, DELAYED RELEASE ORAL DAILY
Qty: 90 TABLET | Refills: 2 | Status: SHIPPED | OUTPATIENT
Start: 2024-08-29

## 2024-08-29 RX ORDER — TOPIRAMATE 50 MG/1
50 TABLET, FILM COATED ORAL 2 TIMES DAILY
Qty: 180 TABLET | Refills: 2 | Status: SHIPPED | OUTPATIENT
Start: 2024-08-29

## 2024-08-29 RX ORDER — RANOLAZINE 1000 MG/1
1000 TABLET, EXTENDED RELEASE ORAL EVERY 12 HOURS
Qty: 180 TABLET | Refills: 2 | Status: SHIPPED | OUTPATIENT
Start: 2024-08-29

## 2024-08-29 RX ORDER — CLOPIDOGREL BISULFATE 75 MG/1
75 TABLET ORAL DAILY
Qty: 90 TABLET | Refills: 2 | Status: SHIPPED | OUTPATIENT
Start: 2024-08-29

## 2024-08-29 RX ORDER — CITALOPRAM HYDROBROMIDE 40 MG/1
40 TABLET ORAL DAILY
Qty: 90 TABLET | Refills: 2 | Status: SHIPPED | OUTPATIENT
Start: 2024-08-29

## 2024-08-29 RX ORDER — METOPROLOL TARTRATE 25 MG/1
12.5 TABLET, FILM COATED ORAL DAILY
Qty: 45 TABLET | Refills: 2 | Status: SHIPPED | OUTPATIENT
Start: 2024-08-29

## 2024-08-29 ASSESSMENT — PATIENT HEALTH QUESTIONNAIRE - PHQ9
SUM OF ALL RESPONSES TO PHQ QUESTIONS 1-9: 2
SUM OF ALL RESPONSES TO PHQ QUESTIONS 1-9: 2
2. FEELING DOWN, DEPRESSED OR HOPELESS: SEVERAL DAYS
SUM OF ALL RESPONSES TO PHQ9 QUESTIONS 1 & 2: 2
SUM OF ALL RESPONSES TO PHQ QUESTIONS 1-9: 2
SUM OF ALL RESPONSES TO PHQ QUESTIONS 1-9: 2
1. LITTLE INTEREST OR PLEASURE IN DOING THINGS: SEVERAL DAYS

## 2024-08-29 NOTE — PROGRESS NOTES
Chief Complaint   Patient presents with    Hypertension     6 MTH fu     Patient has not been out of the country in (14 months), NO diarrhea, NO cough, NO chest conjestion, NO temp.  Pt has not been around anyone with these symptoms.     Health Maintenance reviewed.    I have reviewed the patient's medical history in detail and updated the computerized patient record.    \"Have you been to the ER, urgent care clinic since your last visit? No  Hospitalized since your last visit?\"    no    “Have you seen or consulted any other health care providers outside of [facility] since your last visit?”    no    “Have you had a colorectal cancer screening such as a colonoscopy/FIT/Cologuard?    no    No colonoscopy on file  No cologuard on file  No FIT/FOBT on file   No flexible sigmoidoscopy on file

## 2024-08-29 NOTE — PROGRESS NOTES
Subjective:     Kip Alves is a 53 y.o. 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 Essential hypertension  -     metoprolol tartrate (LOPRESSOR) 25 MG tablet; Take 0.5 tablets by mouth daily, Disp-45 tablet, R-2Normal  -     Basic Metabolic Panel; Future  2. Need for vaccination  -     Influenza, FLUCELVAX Trivalent, (age 6 mo+) IM, Preservative Free, 0.5mL  3. Screening for colon cancer  -     Cologuard (Fecal DNA Colorectal Cancer Screening)  4. Encounter for colorectal cancer screening using Cologuard test  5. Hemiparesis affecting left side as late effect of cerebrovascular accident (CVA) (HCC)  -     clopidogrel (PLAVIX) 75 MG tablet; Take 1 tablet by mouth daily, Disp-90 tablet, R-2Normal  -     ranolazine (RANEXA) 1000 MG extended release tablet; Take 1 tablet by mouth in the morning and 1 tablet in the evening., Disp-180 tablet, R-2Normal  6. Intractable episodic cluster headache  -     topiramate (TOPAMAX) 50 MG tablet; Take 1 tablet by mouth 2 times daily, Disp-180 tablet, R-2Normal  7. Depression, major, recurrent,  the evening. 180 tablet 2    topiramate (TOPAMAX) 50 MG tablet Take 1 tablet by mouth 2 times daily 180 tablet 2    isosorbide mononitrate (IMDUR) 120 MG extended release tablet Take 1 tablet by mouth every morning 90 tablet 3    Multiple Vitamins-Minerals (THERAPEUTIC MULTIVITAMIN-MINERALS) tablet Take 1 tablet by mouth daily      Omega-3 Fatty Acids (FISH OIL) 1000 MG capsule Take by mouth daily      aspirin 81 MG EC tablet Take 1 tablet by mouth daily      Evolocumab (REPATHA SURECLICK) 140 MG/ML SOAJ Inject 140 mg under the skin every 2 weeks      fexofenadine (ALLEGRA) 180 MG tablet Take by mouth daily      Fremanezumab-vfrm (AJOVY) 225 MG/1.5ML SOAJ Inject 1.5 mLs into the skin      gabapentin (NEURONTIN) 300 MG capsule Take 1 capsule by mouth 3 times daily.       No current facility-administered medications for this visit.       Return in about 6 months (around 2/28/2025).    Subjective   History of Present Illness    Routine follow-up.  Here with his significant other.  Feels okay no complaints.  No new weakness.  No issues with his medicines which she has been on for a long time.  Sees a neurologist for chronic headaches since his stroke.  Routinely sees his cardiologist as well.    Social History     Social History Narrative    Lives on his own friends and relatives look in on him.      Family History   Problem Relation Age of Onset    Heart Disease Mother     Heart Disease Father     Hypertension Mother     Diabetes Father      Past Surgical History:   Procedure Laterality Date    CARDIAC PROCEDURE N/A 10/4/2023    Left heart cath / coronary angiography performed by Anu Adam MD at [facility] CARDIAC CATH LAB    CARDIAC PROCEDURE N/A 10/4/2023    Instantaneous wave-free ratio (iFR) performed by Anu Adam MD at [facility] CARDIAC CATH LAB    CARDIAC PROCEDURE N/A 10/4/2023    Percutaneous coronary intervention performed by Anu Adam MD at [facility] CARDIAC CATH LAB    CORONARY

## 2024-08-30 LAB
BUN SERPL-MCNC: 8 MG/DL (ref 6–24)
BUN/CREAT SERPL: 7 (ref 9–20)
CALCIUM SERPL-MCNC: 8.9 MG/DL (ref 8.7–10.2)
CHLORIDE SERPL-SCNC: 104 MMOL/L (ref 96–106)
CHOLEST SERPL-MCNC: 181 MG/DL (ref 100–199)
CO2 SERPL-SCNC: 20 MMOL/L (ref 20–29)
CREAT SERPL-MCNC: 1.12 MG/DL (ref 0.76–1.27)
EGFRCR SERPLBLD CKD-EPI 2021: 78 ML/MIN/1.73
GLUCOSE SERPL-MCNC: 91 MG/DL (ref 70–99)
HDLC SERPL-MCNC: 40 MG/DL
IMP & REVIEW OF LAB RESULTS: NORMAL
LDLC SERPL CALC-MCNC: 100 MG/DL (ref 0–99)
POTASSIUM SERPL-SCNC: 4.4 MMOL/L (ref 3.5–5.2)
SODIUM SERPL-SCNC: 139 MMOL/L (ref 134–144)
TRIGL SERPL-MCNC: 242 MG/DL (ref 0–149)
VLDLC SERPL CALC-MCNC: 41 MG/DL (ref 5–40)

## 2024-09-25 NOTE — PROGRESS NOTES
lisdexamfetamine (Vyvanse) 40 mg capsule    INITIAL NUTRITION ASSESSMENT     RECOMMENDATIONS/PLAN:   Recommend speech therapy eval to determine safe diet texture  Monitor/encourage PO intakes >75% to meet estimated needs  REASON FOR ASSESSMENT:   []  MD Consult:    [x] General Nutrition Management and Supplements   [] Management of Tube Feeding   [] Calorie Count    [] Diet Education    NUTRITION ASSESSMENT:   Client History: 48 yrs old Male admitted with TIA. PMHx: CAD, GERD, HTN, stroke  Cultural/Hindu Food Preferences: None Identified    FOOD/NUTRITION HISTORY  Diet History: no mention of weight loss PTA  Food Allergies:  [x] NKFA     Pertinent PTA Medications: protonix, omega 3     NUTRITION INTAKE   Diet Order: cardiac  Average PO Intake:       No data found. Pertinent Medications:  [x] Reviewed; colace, omega 3, protonix    Pt expected to meet estimated nutrient needs through next review:          [x]  Yes    Meets estimated needs when PO intakes >75% ANTHROPOMETRICS  Height: 5' 10\" (177.8 cm)       Weight: 119.7 kg (263 lb 14.3 oz)    BMI: 39.9 kg/m^2  -  obese (30%-39.9% BMI)        Weight change: weight stable x 1 year per chart review                                  Comparison to Reference Standards:  IBW: 166 lbs      %IBW: 158%      AdjBW: 86kg    NUTRITION-FOCUSED PHYSICAL ASSESSMENT  Skin:  WNL per documentation  GI: no BM documented    BIOCHEMICAL DATA & MEDICAL TESTS  Pertinent Labs:  [x] Reviewed; WNL    NUTRITION PRESCRIPTION  Calories: 2126 kcal/day based on miffilin x 1.3  Protein:  g/day based on 1.0-1.3 g/kg  CHO: 265 g/day based on 50% of total energy  Fluid: 2126 ml/day based on 1 kcal/ml      NUTRITION DIAGNOSES:   1. At risk for unintended weight loss due to decreased PO intake, possible difficulty swallowing as evidenced by no PO intakes documented yet and recent TIA    NUTRITION INTERVENTIONS:   INTERVENTIONS:        GOALS:  1. Recommend speech evaluation to determine safe diet consistency 1.       LEARNING NEEDS (Diet, Supplementation, Food/Nutrient-Drug Interaction):   [x] None Identified  [] Inpatient education provided/documented    [] Identified and patient:  [] Declined     [] Was not appropriate/indicated  NUTRITION MONITORING /EVALUATION:   Follow PO intake  Monitor wt    [] Participated in Interdisciplinary Rounds  [x] Interdisciplinary Care Plan Reviewed/Documented  DISCHARGE NUTRITION RECOMMENDATIONS ADDRESSED:         _ To be determined closer to discharge    NUTRITION RISK:     []  At risk                     [x]  Not currently at risk     Will follow-up per policy.   Florentino Sethi, [address] [place] [address]  PAGER:  [phone]

## 2024-09-29 LAB — NONINV COLON CA DNA+OCC BLD SCRN STL QL: NEGATIVE

## 2024-10-29 RX ORDER — ISOSORBIDE MONONITRATE 60 MG/1
60 TABLET, EXTENDED RELEASE ORAL EVERY MORNING
Qty: 90 TABLET | Refills: 1 | Status: SHIPPED | OUTPATIENT
Start: 2024-10-29

## 2025-05-03 SDOH — ECONOMIC STABILITY: INCOME INSECURITY: IN THE LAST 12 MONTHS, WAS THERE A TIME WHEN YOU WERE NOT ABLE TO PAY THE MORTGAGE OR RENT ON TIME?: NO

## 2025-05-03 SDOH — ECONOMIC STABILITY: FOOD INSECURITY: WITHIN THE PAST 12 MONTHS, THE FOOD YOU BOUGHT JUST DIDN'T LAST AND YOU DIDN'T HAVE MONEY TO GET MORE.: SOMETIMES TRUE

## 2025-05-03 SDOH — ECONOMIC STABILITY: FOOD INSECURITY: WITHIN THE PAST 12 MONTHS, YOU WORRIED THAT YOUR FOOD WOULD RUN OUT BEFORE YOU GOT MONEY TO BUY MORE.: SOMETIMES TRUE

## 2025-05-03 SDOH — ECONOMIC STABILITY: TRANSPORTATION INSECURITY
IN THE PAST 12 MONTHS, HAS THE LACK OF TRANSPORTATION KEPT YOU FROM MEDICAL APPOINTMENTS OR FROM GETTING MEDICATIONS?: NO

## 2025-05-05 ENCOUNTER — TELEMEDICINE (OUTPATIENT)
Facility: CLINIC | Age: 55
End: 2025-05-05
Payer: MEDICAID

## 2025-05-05 DIAGNOSIS — E78.2 MIXED HYPERLIPIDEMIA: ICD-10-CM

## 2025-05-05 DIAGNOSIS — G44.011 INTRACTABLE EPISODIC CLUSTER HEADACHE: ICD-10-CM

## 2025-05-05 DIAGNOSIS — G44.019 EPISODIC CLUSTER HEADACHE, NOT INTRACTABLE: ICD-10-CM

## 2025-05-05 DIAGNOSIS — G57.12 MERALGIA PARESTHETICA OF LEFT SIDE: ICD-10-CM

## 2025-05-05 DIAGNOSIS — I69.354 HEMIPARESIS AFFECTING LEFT SIDE AS LATE EFFECT OF CEREBROVASCULAR ACCIDENT (CVA) (HCC): Primary | ICD-10-CM

## 2025-05-05 DIAGNOSIS — R06.83 SNORING: ICD-10-CM

## 2025-05-05 DIAGNOSIS — F33.1 DEPRESSION, MAJOR, RECURRENT, MODERATE (HCC): ICD-10-CM

## 2025-05-05 DIAGNOSIS — K21.9 GASTRO-ESOPHAGEAL REFLUX DISEASE WITHOUT ESOPHAGITIS: ICD-10-CM

## 2025-05-05 DIAGNOSIS — E66.01 SEVERE OBESITY (BMI 35.0-39.9) WITH COMORBIDITY (HCC): ICD-10-CM

## 2025-05-05 DIAGNOSIS — I10 ESSENTIAL HYPERTENSION: ICD-10-CM

## 2025-05-05 DIAGNOSIS — I25.10 CORONARY ARTERY DISEASE INVOLVING NATIVE CORONARY ARTERY OF NATIVE HEART WITHOUT ANGINA PECTORIS: ICD-10-CM

## 2025-05-05 PROCEDURE — 99214 OFFICE O/P EST MOD 30 MIN: CPT | Performed by: FAMILY MEDICINE

## 2025-05-05 RX ORDER — TOPIRAMATE 50 MG/1
50 TABLET, FILM COATED ORAL 2 TIMES DAILY
Qty: 200 TABLET | Refills: 2 | Status: SHIPPED | OUTPATIENT
Start: 2025-05-05

## 2025-05-05 RX ORDER — RANOLAZINE 1000 MG/1
1000 TABLET, EXTENDED RELEASE ORAL EVERY 12 HOURS
Qty: 200 TABLET | Refills: 2 | Status: SHIPPED | OUTPATIENT
Start: 2025-05-05

## 2025-05-05 RX ORDER — CLOPIDOGREL BISULFATE 75 MG/1
75 TABLET ORAL DAILY
Qty: 100 TABLET | Refills: 2 | Status: SHIPPED | OUTPATIENT
Start: 2025-05-05

## 2025-05-05 RX ORDER — PANTOPRAZOLE SODIUM 40 MG/1
40 TABLET, DELAYED RELEASE ORAL DAILY
Qty: 100 TABLET | Refills: 2 | Status: SHIPPED | OUTPATIENT
Start: 2025-05-05

## 2025-05-05 RX ORDER — METOPROLOL TARTRATE 25 MG/1
12.5 TABLET, FILM COATED ORAL DAILY
Qty: 50 TABLET | Refills: 2 | Status: SHIPPED | OUTPATIENT
Start: 2025-05-05

## 2025-05-05 RX ORDER — CITALOPRAM HYDROBROMIDE 40 MG/1
40 TABLET ORAL DAILY
Qty: 100 TABLET | Refills: 2 | Status: SHIPPED | OUTPATIENT
Start: 2025-05-05

## 2025-05-05 ASSESSMENT — PATIENT HEALTH QUESTIONNAIRE - PHQ9
10. IF YOU CHECKED OFF ANY PROBLEMS, HOW DIFFICULT HAVE THESE PROBLEMS MADE IT FOR YOU TO DO YOUR WORK, TAKE CARE OF THINGS AT HOME, OR GET ALONG WITH OTHER PEOPLE: NOT DIFFICULT AT ALL
5. POOR APPETITE OR OVEREATING: NOT AT ALL
6. FEELING BAD ABOUT YOURSELF - OR THAT YOU ARE A FAILURE OR HAVE LET YOURSELF OR YOUR FAMILY DOWN: NOT AT ALL
SUM OF ALL RESPONSES TO PHQ QUESTIONS 1-9: 0
3. TROUBLE FALLING OR STAYING ASLEEP: NOT AT ALL
7. TROUBLE CONCENTRATING ON THINGS, SUCH AS READING THE NEWSPAPER OR WATCHING TELEVISION: NOT AT ALL
4. FEELING TIRED OR HAVING LITTLE ENERGY: NOT AT ALL
SUM OF ALL RESPONSES TO PHQ QUESTIONS 1-9: 0
8. MOVING OR SPEAKING SO SLOWLY THAT OTHER PEOPLE COULD HAVE NOTICED. OR THE OPPOSITE, BEING SO FIGETY OR RESTLESS THAT YOU HAVE BEEN MOVING AROUND A LOT MORE THAN USUAL: NOT AT ALL
SUM OF ALL RESPONSES TO PHQ QUESTIONS 1-9: 0
SUM OF ALL RESPONSES TO PHQ QUESTIONS 1-9: 0
2. FEELING DOWN, DEPRESSED OR HOPELESS: NOT AT ALL
1. LITTLE INTEREST OR PLEASURE IN DOING THINGS: NOT AT ALL
9. THOUGHTS THAT YOU WOULD BE BETTER OFF DEAD, OR OF HURTING YOURSELF: NOT AT ALL

## 2025-05-05 NOTE — PROGRESS NOTES
Kip Alves, was evaluated through a synchronous (real-time) audio-video encounter. The patient (or guardian if applicable) is aware that this is a billable service, which includes applicable co-pays. This Virtual Visit was conducted with patient's (and/or legal guardian's) consent. Patient identification was verified, and a caregiver was present when appropriate.   The patient was located at Home: [address]  [facility] [address]  Provider was located at Facility (Appt Dept): [address] [place] [address] [address]  [place] [address]  Confirm you are appropriately licensed, registered, or certified to deliver care in the state where the patient is located as indicated above. If you are not or unsure, please re-schedule the visit: Yes, I confirm.     Kip Alves (:  1970) is a Established patient, presenting virtually for evaluation of the following: HTN WT Management    Assessment & Plan   Below is the assessment and plan developed based on review of pertinent history, physical exam, labs, studies, and medications.  Assessment & Plan  1. Weight management.  Without a diagnosis of diabetes, it may be challenging to prescribe weight loss injections. He is advised to continue his efforts in increasing physical activity and avoiding sweet drinks and bread. If obstructive sleep apnea is confirmed, Mounjaro will be considered.    2. Obstructive sleep apnea.  Given his risk factors, including fatigue and snoring, retesting for obstructive sleep apnea is recommended. A sleep study will be ordered to reassess his condition.    3. Medication management.  His medications have been refilled. He is also under the care of neurology and cardiology specialists who manage his other medications.    Follow-up  The patient will follow up in a few months to review the results of the sleep study.    Results  Testing  Sleep study done 5-6 years ago was negative for sleep apnea.  1. Hemiparesis affecting left side as late 
Have you been to the ER, urgent care clinic since your last visit?  Hospitalized since your last visit?   NO    Have you seen or consulted any other health care providers outside our system since your last visit?   NO             Chief Complaint   Patient presents with    Hypertension       
attendance with the patient were advised that Artificial Intelligence will be utilized during this visit to record and process the conversation to generate a clinical note. The patient (or guardian, if applicable) and other individuals in attendance at the appointment consented to the use of AI, including the recording.      An electronic signature was used to authenticate this note.    --Junior Anaya MD

## 2025-06-18 ENCOUNTER — RESULTS FOLLOW-UP (OUTPATIENT)
Facility: CLINIC | Age: 55
End: 2025-06-18

## 2025-06-18 LAB
BUN SERPL-MCNC: 10 MG/DL (ref 6–24)
BUN/CREAT SERPL: 9 (ref 9–20)
CALCIUM SERPL-MCNC: 9 MG/DL (ref 8.7–10.2)
CHLORIDE SERPL-SCNC: 108 MMOL/L (ref 96–106)
CHOLEST SERPL-MCNC: 158 MG/DL (ref 100–199)
CO2 SERPL-SCNC: 17 MMOL/L (ref 20–29)
CREAT SERPL-MCNC: 1.06 MG/DL (ref 0.76–1.27)
EGFRCR SERPLBLD CKD-EPI 2021: 82 ML/MIN/1.73
GLUCOSE SERPL-MCNC: 90 MG/DL (ref 70–99)
HDLC SERPL-MCNC: 37 MG/DL
LDLC SERPL CALC-MCNC: 83 MG/DL (ref 0–99)
POTASSIUM SERPL-SCNC: 4 MMOL/L (ref 3.5–5.2)
SODIUM SERPL-SCNC: 140 MMOL/L (ref 134–144)
TRIGL SERPL-MCNC: 228 MG/DL (ref 0–149)
VLDLC SERPL CALC-MCNC: 38 MG/DL (ref 5–40)

## 2025-06-19 LAB — IMP & REVIEW OF LAB RESULTS: NORMAL

## (undated) DEVICE — CATHETER ETER CARD MULTIPAK MULTIPAK 5FR PERFORMA

## (undated) DEVICE — PINNACLE INTRODUCER SHEATH: Brand: PINNACLE

## (undated) DEVICE — PRESSURE GUIDEWIRE: Brand: COMET™ II

## (undated) DEVICE — GUIDEWIRE VASC L145CM 0.035IN J TIP L3MM PTFE FIX COR NAMIC

## (undated) DEVICE — PACK PROCEDURE SURG HRT CATH

## (undated) DEVICE — CATHETER GUID 6FR L100CM DIA0.071IN NYL SHFT EBU3.5

## (undated) DEVICE — PROVE COVER: Brand: UNBRANDED

## (undated) DEVICE — KIT ACCS INTRO 4FR L10CM NDL 21GA L7CM GWIRE L40CM

## (undated) DEVICE — CATH BLLN ANGIO 2X10MM SC EUPHORA RX